# Patient Record
Sex: FEMALE | Race: WHITE | Employment: OTHER | URBAN - METROPOLITAN AREA
[De-identification: names, ages, dates, MRNs, and addresses within clinical notes are randomized per-mention and may not be internally consistent; named-entity substitution may affect disease eponyms.]

---

## 2018-12-31 LAB
AVERAGE GLUCOSE: NORMAL
CHOLESTEROL, TOTAL: 151 MG/DL
CHOLESTEROL/HDL RATIO: NORMAL
HBA1C MFR BLD: 5.8 %
HDLC SERPL-MCNC: 45 MG/DL (ref 35–70)
LDL CHOLESTEROL CALCULATED: 76 MG/DL (ref 0–160)
TRIGL SERPL-MCNC: 151 MG/DL
VLDLC SERPL CALC-MCNC: 30 MG/DL

## 2019-01-04 ENCOUNTER — ANESTHESIA EVENT (OUTPATIENT)
Dept: OPERATING ROOM | Age: 74
End: 2019-01-04
Payer: MEDICARE

## 2019-01-08 ENCOUNTER — HOSPITAL ENCOUNTER (OUTPATIENT)
Age: 74
Setting detail: OUTPATIENT SURGERY
Discharge: HOME | End: 2019-01-08
Attending: SPECIALIST | Admitting: SPECIALIST
Payer: MEDICARE

## 2019-01-08 ENCOUNTER — ANESTHESIA (OUTPATIENT)
Dept: OPERATING ROOM | Age: 74
End: 2019-01-08
Payer: MEDICARE

## 2019-01-08 VITALS — OXYGEN SATURATION: 96 % | SYSTOLIC BLOOD PRESSURE: 92 MMHG | DIASTOLIC BLOOD PRESSURE: 43 MMHG

## 2019-01-08 VITALS
OXYGEN SATURATION: 99 % | BODY MASS INDEX: 34.41 KG/M2 | DIASTOLIC BLOOD PRESSURE: 59 MMHG | RESPIRATION RATE: 16 BRPM | SYSTOLIC BLOOD PRESSURE: 129 MMHG | HEART RATE: 71 BPM | HEIGHT: 62 IN | TEMPERATURE: 97.3 F | WEIGHT: 187 LBS

## 2019-01-08 LAB
ALBUMIN SERPL-MCNC: 3.6 GM/DL (ref 3.4–5)
ALP BLD-CCNC: 97 IU/L (ref 40–129)
ALT SERPL-CCNC: 10 U/L (ref 10–40)
ANION GAP SERPL CALCULATED.3IONS-SCNC: 12 MMOL/L (ref 4–16)
AST SERPL-CCNC: 12 IU/L (ref 15–37)
BILIRUB SERPL-MCNC: 0.2 MG/DL (ref 0–1)
BUN BLDV-MCNC: 31 MG/DL (ref 6–23)
CALCIUM SERPL-MCNC: 9.7 MG/DL (ref 8.3–10.6)
CEA: 4.2 NG/ML
CHLORIDE BLD-SCNC: 103 MMOL/L (ref 99–110)
CO2: 23 MMOL/L (ref 21–32)
CREAT SERPL-MCNC: 1.7 MG/DL (ref 0.6–1.1)
GFR AFRICAN AMERICAN: 36 ML/MIN/1.73M2
GFR NON-AFRICAN AMERICAN: 29 ML/MIN/1.73M2
GLUCOSE BLD-MCNC: 134 MG/DL (ref 70–99)
HCT VFR BLD CALC: 28.3 % (ref 37–47)
HEMOGLOBIN: 8.1 GM/DL (ref 12.5–16)
MCH RBC QN AUTO: 23.3 PG (ref 27–31)
MCHC RBC AUTO-ENTMCNC: 28.6 % (ref 32–36)
MCV RBC AUTO: 81.3 FL (ref 78–100)
PDW BLD-RTO: 19.9 % (ref 11.7–14.9)
PLATELET # BLD: 222 K/CU MM (ref 140–440)
PMV BLD AUTO: 10.8 FL (ref 7.5–11.1)
POTASSIUM SERPL-SCNC: 5.2 MMOL/L (ref 3.5–5.1)
RBC # BLD: 3.48 M/CU MM (ref 4.2–5.4)
SODIUM BLD-SCNC: 138 MMOL/L (ref 135–145)
TOTAL PROTEIN: 6.1 GM/DL (ref 6.4–8.2)
WBC # BLD: 5 K/CU MM (ref 4–10.5)

## 2019-01-08 PROCEDURE — 80053 COMPREHEN METABOLIC PANEL: CPT

## 2019-01-08 PROCEDURE — 7100000010 HC PHASE II RECOVERY - FIRST 15 MIN: Performed by: SPECIALIST

## 2019-01-08 PROCEDURE — 2580000003 HC RX 258: Performed by: SPECIALIST

## 2019-01-08 PROCEDURE — 6360000002 HC RX W HCPCS: Performed by: NURSE ANESTHETIST, CERTIFIED REGISTERED

## 2019-01-08 PROCEDURE — 3700000001 HC ADD 15 MINUTES (ANESTHESIA): Performed by: SPECIALIST

## 2019-01-08 PROCEDURE — 2709999900 HC NON-CHARGEABLE SUPPLY: Performed by: SPECIALIST

## 2019-01-08 PROCEDURE — 88305 TISSUE EXAM BY PATHOLOGIST: CPT

## 2019-01-08 PROCEDURE — 3700000000 HC ANESTHESIA ATTENDED CARE: Performed by: SPECIALIST

## 2019-01-08 PROCEDURE — 7100000011 HC PHASE II RECOVERY - ADDTL 15 MIN: Performed by: SPECIALIST

## 2019-01-08 PROCEDURE — 2500000003 HC RX 250 WO HCPCS: Performed by: NURSE ANESTHETIST, CERTIFIED REGISTERED

## 2019-01-08 PROCEDURE — 3609008300 HC SIGMOIDOSCOPY W/BIOPSY SINGLE/MULTIPLE: Performed by: SPECIALIST

## 2019-01-08 PROCEDURE — 82378 CARCINOEMBRYONIC ANTIGEN: CPT

## 2019-01-08 PROCEDURE — 85027 COMPLETE CBC AUTOMATED: CPT

## 2019-01-08 RX ORDER — PROPOFOL 10 MG/ML
INJECTION, EMULSION INTRAVENOUS PRN
Status: DISCONTINUED | OUTPATIENT
Start: 2019-01-08 | End: 2019-01-08 | Stop reason: SDUPTHER

## 2019-01-08 RX ORDER — LIDOCAINE HYDROCHLORIDE 20 MG/ML
INJECTION, SOLUTION EPIDURAL; INFILTRATION; INTRACAUDAL; PERINEURAL PRN
Status: DISCONTINUED | OUTPATIENT
Start: 2019-01-08 | End: 2019-01-08 | Stop reason: SDUPTHER

## 2019-01-08 RX ORDER — SODIUM CHLORIDE, SODIUM LACTATE, POTASSIUM CHLORIDE, CALCIUM CHLORIDE 600; 310; 30; 20 MG/100ML; MG/100ML; MG/100ML; MG/100ML
INJECTION, SOLUTION INTRAVENOUS CONTINUOUS
Status: DISCONTINUED | OUTPATIENT
Start: 2019-01-08 | End: 2019-01-08 | Stop reason: HOSPADM

## 2019-01-08 RX ADMIN — SODIUM CHLORIDE, POTASSIUM CHLORIDE, SODIUM LACTATE AND CALCIUM CHLORIDE: 600; 310; 30; 20 INJECTION, SOLUTION INTRAVENOUS at 09:28

## 2019-01-08 RX ADMIN — PROPOFOL 30 MG: 10 INJECTION, EMULSION INTRAVENOUS at 10:19

## 2019-01-08 RX ADMIN — SODIUM CHLORIDE, POTASSIUM CHLORIDE, SODIUM LACTATE AND CALCIUM CHLORIDE: 600; 310; 30; 20 INJECTION, SOLUTION INTRAVENOUS at 11:12

## 2019-01-08 RX ADMIN — PROPOFOL 20 MG: 10 INJECTION, EMULSION INTRAVENOUS at 10:21

## 2019-01-08 RX ADMIN — PROPOFOL 30 MG: 10 INJECTION, EMULSION INTRAVENOUS at 10:23

## 2019-01-08 RX ADMIN — PROPOFOL 50 MG: 10 INJECTION, EMULSION INTRAVENOUS at 10:13

## 2019-01-08 RX ADMIN — PROPOFOL 30 MG: 10 INJECTION, EMULSION INTRAVENOUS at 10:27

## 2019-01-08 RX ADMIN — PROPOFOL 20 MG: 10 INJECTION, EMULSION INTRAVENOUS at 10:24

## 2019-01-08 RX ADMIN — LIDOCAINE HYDROCHLORIDE 60 MG: 20 INJECTION, SOLUTION EPIDURAL; INFILTRATION; INTRACAUDAL; PERINEURAL at 10:13

## 2019-01-08 RX ADMIN — PROPOFOL 30 MG: 10 INJECTION, EMULSION INTRAVENOUS at 10:15

## 2019-01-08 RX ADMIN — PROPOFOL 20 MG: 10 INJECTION, EMULSION INTRAVENOUS at 10:17

## 2019-01-08 RX ADMIN — PROPOFOL 20 MG: 10 INJECTION, EMULSION INTRAVENOUS at 10:26

## 2019-01-08 ASSESSMENT — PAIN SCALES - GENERAL
PAINLEVEL_OUTOF10: 0

## 2019-01-08 ASSESSMENT — PAIN - FUNCTIONAL ASSESSMENT: PAIN_FUNCTIONAL_ASSESSMENT: 0-10

## 2019-01-10 ENCOUNTER — ANESTHESIA EVENT (OUTPATIENT)
Dept: OPERATING ROOM | Age: 74
DRG: 331 | End: 2019-01-10
Payer: MEDICARE

## 2019-01-10 ENCOUNTER — OFFICE VISIT (OUTPATIENT)
Dept: SURGERY | Age: 74
End: 2019-01-10
Payer: MEDICARE

## 2019-01-10 VITALS
SYSTOLIC BLOOD PRESSURE: 124 MMHG | HEART RATE: 101 BPM | RESPIRATION RATE: 20 BRPM | BODY MASS INDEX: 34.31 KG/M2 | DIASTOLIC BLOOD PRESSURE: 64 MMHG | WEIGHT: 187.6 LBS

## 2019-01-10 DIAGNOSIS — C18.7 MALIGNANT NEOPLASM OF SIGMOID COLON (HCC): Primary | ICD-10-CM

## 2019-01-10 PROCEDURE — 3017F COLORECTAL CA SCREEN DOC REV: CPT | Performed by: SURGERY

## 2019-01-10 PROCEDURE — G8427 DOCREV CUR MEDS BY ELIG CLIN: HCPCS | Performed by: SURGERY

## 2019-01-10 PROCEDURE — G8484 FLU IMMUNIZE NO ADMIN: HCPCS | Performed by: SURGERY

## 2019-01-10 PROCEDURE — 1036F TOBACCO NON-USER: CPT | Performed by: SURGERY

## 2019-01-10 PROCEDURE — 99213 OFFICE O/P EST LOW 20 MIN: CPT | Performed by: SURGERY

## 2019-01-10 PROCEDURE — 1123F ACP DISCUSS/DSCN MKR DOCD: CPT | Performed by: SURGERY

## 2019-01-10 PROCEDURE — G8417 CALC BMI ABV UP PARAM F/U: HCPCS | Performed by: SURGERY

## 2019-01-10 PROCEDURE — 4040F PNEUMOC VAC/ADMIN/RCVD: CPT | Performed by: SURGERY

## 2019-01-10 PROCEDURE — 1101F PT FALLS ASSESS-DOCD LE1/YR: CPT | Performed by: SURGERY

## 2019-01-10 PROCEDURE — G8400 PT W/DXA NO RESULTS DOC: HCPCS | Performed by: SURGERY

## 2019-01-10 PROCEDURE — 1090F PRES/ABSN URINE INCON ASSESS: CPT | Performed by: SURGERY

## 2019-01-10 RX ORDER — ERYTHROMYCIN 500 MG/1
TABLET, COATED ORAL
Qty: 6 TABLET | Refills: 0 | Status: ON HOLD | OUTPATIENT
Start: 2019-01-10 | End: 2019-02-20

## 2019-01-10 ASSESSMENT — ENCOUNTER SYMPTOMS
DIARRHEA: 0
BLOOD IN STOOL: 0
COUGH: 0
ABDOMINAL DISTENTION: 0
CHEST TIGHTNESS: 0
ABDOMINAL PAIN: 0
CONSTIPATION: 0

## 2019-01-11 ENCOUNTER — HOSPITAL ENCOUNTER (OUTPATIENT)
Dept: PREADMISSION TESTING | Age: 74
Discharge: HOME | End: 2019-01-15
Payer: MEDICARE

## 2019-01-11 VITALS
TEMPERATURE: 97.2 F | HEIGHT: 61 IN | BODY MASS INDEX: 35.21 KG/M2 | WEIGHT: 186.5 LBS | HEART RATE: 88 BPM | DIASTOLIC BLOOD PRESSURE: 58 MMHG | SYSTOLIC BLOOD PRESSURE: 132 MMHG | RESPIRATION RATE: 16 BRPM | OXYGEN SATURATION: 98 %

## 2019-01-11 ASSESSMENT — ENCOUNTER SYMPTOMS: SHORTNESS OF BREATH: 1

## 2019-01-11 ASSESSMENT — PAIN SCALES - GENERAL: PAINLEVEL_OUTOF10: 0

## 2019-01-14 ENCOUNTER — HOSPITAL ENCOUNTER (OUTPATIENT)
Dept: CT IMAGING | Age: 74
Discharge: HOME | DRG: 331 | End: 2019-01-14
Payer: MEDICARE

## 2019-01-14 DIAGNOSIS — D49.0 DUDLEY-KLINGENSTEIN SYNDROME: ICD-10-CM

## 2019-01-14 PROCEDURE — 74176 CT ABD & PELVIS W/O CONTRAST: CPT

## 2019-01-14 PROCEDURE — 6360000004 HC RX CONTRAST MEDICATION: Performed by: SPECIALIST

## 2019-01-14 RX ADMIN — IOHEXOL 50 ML: 240 INJECTION, SOLUTION INTRATHECAL; INTRAVASCULAR; INTRAVENOUS; ORAL at 12:17

## 2019-01-16 ENCOUNTER — ANESTHESIA (OUTPATIENT)
Dept: OPERATING ROOM | Age: 74
DRG: 331 | End: 2019-01-16
Payer: MEDICARE

## 2019-01-16 ENCOUNTER — HOSPITAL ENCOUNTER (INPATIENT)
Age: 74
LOS: 5 days | Discharge: HOME | DRG: 331 | End: 2019-01-21
Attending: SURGERY | Admitting: SURGERY
Payer: MEDICARE

## 2019-01-16 VITALS
OXYGEN SATURATION: 100 % | DIASTOLIC BLOOD PRESSURE: 64 MMHG | RESPIRATION RATE: 14 BRPM | TEMPERATURE: 97.8 F | SYSTOLIC BLOOD PRESSURE: 131 MMHG

## 2019-01-16 DIAGNOSIS — C18.7 CANCER OF SIGMOID COLON (HCC): Primary | ICD-10-CM

## 2019-01-16 LAB
GLUCOSE BLD-MCNC: 141 MG/DL (ref 70–99)
GLUCOSE BLD-MCNC: 165 MG/DL (ref 70–99)
HCT VFR BLD CALC: 26.5 % (ref 37–47)
HEMOGLOBIN: 7.5 GM/DL (ref 12.5–16)
MCH RBC QN AUTO: 22.9 PG (ref 27–31)
MCHC RBC AUTO-ENTMCNC: 28.3 % (ref 32–36)
MCV RBC AUTO: 81 FL (ref 78–100)
PDW BLD-RTO: 18.8 % (ref 11.7–14.9)
PLATELET # BLD: 223 K/CU MM (ref 140–440)
PMV BLD AUTO: 9.9 FL (ref 7.5–11.1)
POTASSIUM SERPL-SCNC: 4.4 MMOL/L (ref 3.5–5.1)
RBC # BLD: 3.27 M/CU MM (ref 4.2–5.4)
WBC # BLD: 6.5 K/CU MM (ref 4–10.5)

## 2019-01-16 PROCEDURE — 3700000001 HC ADD 15 MINUTES (ANESTHESIA): Performed by: SURGERY

## 2019-01-16 PROCEDURE — 84132 ASSAY OF SERUM POTASSIUM: CPT

## 2019-01-16 PROCEDURE — 2780000010 HC IMPLANT OTHER: Performed by: SURGERY

## 2019-01-16 PROCEDURE — 2500000003 HC RX 250 WO HCPCS: Performed by: NURSE ANESTHETIST, CERTIFIED REGISTERED

## 2019-01-16 PROCEDURE — 82962 GLUCOSE BLOOD TEST: CPT

## 2019-01-16 PROCEDURE — 85027 COMPLETE CBC AUTOMATED: CPT

## 2019-01-16 PROCEDURE — 1200000000 HC SEMI PRIVATE

## 2019-01-16 PROCEDURE — 2500000003 HC RX 250 WO HCPCS: Performed by: SURGERY

## 2019-01-16 PROCEDURE — 0DBN0ZZ EXCISION OF SIGMOID COLON, OPEN APPROACH: ICD-10-PCS | Performed by: SURGERY

## 2019-01-16 PROCEDURE — 88309 TISSUE EXAM BY PATHOLOGIST: CPT

## 2019-01-16 PROCEDURE — 3600000004 HC SURGERY LEVEL 4 BASE: Performed by: SURGERY

## 2019-01-16 PROCEDURE — 7100000000 HC PACU RECOVERY - FIRST 15 MIN: Performed by: SURGERY

## 2019-01-16 PROCEDURE — 2709999900 HC NON-CHARGEABLE SUPPLY: Performed by: SURGERY

## 2019-01-16 PROCEDURE — 88311 DECALCIFY TISSUE: CPT

## 2019-01-16 PROCEDURE — 6360000002 HC RX W HCPCS: Performed by: NURSE ANESTHETIST, CERTIFIED REGISTERED

## 2019-01-16 PROCEDURE — 7100000001 HC PACU RECOVERY - ADDTL 15 MIN: Performed by: SURGERY

## 2019-01-16 PROCEDURE — 44140 PARTIAL REMOVAL OF COLON: CPT | Performed by: SURGERY

## 2019-01-16 PROCEDURE — 6360000002 HC RX W HCPCS: Performed by: ANESTHESIOLOGY

## 2019-01-16 PROCEDURE — 3600000014 HC SURGERY LEVEL 4 ADDTL 15MIN: Performed by: SURGERY

## 2019-01-16 PROCEDURE — 2580000003 HC RX 258: Performed by: ANESTHESIOLOGY

## 2019-01-16 PROCEDURE — 2720000010 HC SURG SUPPLY STERILE: Performed by: SURGERY

## 2019-01-16 PROCEDURE — 64488 TAP BLOCK BI INJECTION: CPT | Performed by: NURSE ANESTHETIST, CERTIFIED REGISTERED

## 2019-01-16 PROCEDURE — 3700000000 HC ANESTHESIA ATTENDED CARE: Performed by: SURGERY

## 2019-01-16 DEVICE — SEALANT HEMSTAT 5ML HUM FIBRIN THROM 2 VI APPL DEV EVICEL: Type: IMPLANTABLE DEVICE | Site: ABDOMEN | Status: FUNCTIONAL

## 2019-01-16 RX ORDER — VECURONIUM BROMIDE 1 MG/ML
INJECTION, POWDER, LYOPHILIZED, FOR SOLUTION INTRAVENOUS PRN
Status: DISCONTINUED | OUTPATIENT
Start: 2019-01-16 | End: 2019-01-16 | Stop reason: SDUPTHER

## 2019-01-16 RX ORDER — KETOROLAC TROMETHAMINE 30 MG/ML
INJECTION, SOLUTION INTRAMUSCULAR; INTRAVENOUS PRN
Status: DISCONTINUED | OUTPATIENT
Start: 2019-01-16 | End: 2019-01-16 | Stop reason: SDUPTHER

## 2019-01-16 RX ORDER — HYDROMORPHONE HCL 110MG/55ML
0.5 PATIENT CONTROLLED ANALGESIA SYRINGE INTRAVENOUS EVERY 5 MIN PRN
Status: DISCONTINUED | OUTPATIENT
Start: 2019-01-16 | End: 2019-01-16 | Stop reason: HOSPADM

## 2019-01-16 RX ORDER — LISINOPRIL 10 MG/1
10 TABLET ORAL EVERY MORNING
Status: DISCONTINUED | OUTPATIENT
Start: 2019-01-17 | End: 2019-01-21 | Stop reason: HOSPADM

## 2019-01-16 RX ORDER — LABETALOL HYDROCHLORIDE 5 MG/ML
5 INJECTION, SOLUTION INTRAVENOUS EVERY 10 MIN PRN
Status: DISCONTINUED | OUTPATIENT
Start: 2019-01-16 | End: 2019-01-16 | Stop reason: HOSPADM

## 2019-01-16 RX ORDER — ACETAMINOPHEN 10 MG/ML
1000 INJECTION, SOLUTION INTRAVENOUS ONCE
Status: COMPLETED | OUTPATIENT
Start: 2019-01-16 | End: 2019-01-16

## 2019-01-16 RX ORDER — ONDANSETRON 2 MG/ML
4 INJECTION INTRAMUSCULAR; INTRAVENOUS
Status: DISCONTINUED | OUTPATIENT
Start: 2019-01-16 | End: 2019-01-16 | Stop reason: HOSPADM

## 2019-01-16 RX ORDER — FENTANYL CITRATE 50 UG/ML
INJECTION, SOLUTION INTRAMUSCULAR; INTRAVENOUS PRN
Status: DISCONTINUED | OUTPATIENT
Start: 2019-01-16 | End: 2019-01-16 | Stop reason: SDUPTHER

## 2019-01-16 RX ORDER — TRIAMTERENE AND HYDROCHLOROTHIAZIDE 37.5; 25 MG/1; MG/1
1 TABLET ORAL EVERY MORNING
Status: DISCONTINUED | OUTPATIENT
Start: 2019-01-17 | End: 2019-01-21 | Stop reason: HOSPADM

## 2019-01-16 RX ORDER — PROPOFOL 10 MG/ML
INJECTION, EMULSION INTRAVENOUS PRN
Status: DISCONTINUED | OUTPATIENT
Start: 2019-01-16 | End: 2019-01-16 | Stop reason: SDUPTHER

## 2019-01-16 RX ORDER — MORPHINE SULFATE 4 MG/ML
1 INJECTION, SOLUTION INTRAMUSCULAR; INTRAVENOUS
Status: DISCONTINUED | OUTPATIENT
Start: 2019-01-16 | End: 2019-01-20

## 2019-01-16 RX ORDER — ACETAMINOPHEN 325 MG/1
650 TABLET ORAL EVERY 4 HOURS PRN
Status: DISCONTINUED | OUTPATIENT
Start: 2019-01-16 | End: 2019-01-21 | Stop reason: HOSPADM

## 2019-01-16 RX ORDER — DEXAMETHASONE SODIUM PHOSPHATE 10 MG/ML
INJECTION, SOLUTION INTRAMUSCULAR; INTRAVENOUS PRN
Status: DISCONTINUED | OUTPATIENT
Start: 2019-01-16 | End: 2019-01-16 | Stop reason: SDUPTHER

## 2019-01-16 RX ORDER — FENTANYL CITRATE 50 UG/ML
25 INJECTION, SOLUTION INTRAMUSCULAR; INTRAVENOUS EVERY 5 MIN PRN
Status: DISCONTINUED | OUTPATIENT
Start: 2019-01-16 | End: 2019-01-16 | Stop reason: HOSPADM

## 2019-01-16 RX ORDER — ONDANSETRON 2 MG/ML
4 INJECTION INTRAMUSCULAR; INTRAVENOUS EVERY 6 HOURS PRN
Status: DISCONTINUED | OUTPATIENT
Start: 2019-01-16 | End: 2019-01-21 | Stop reason: HOSPADM

## 2019-01-16 RX ORDER — DEXTROSE, SODIUM CHLORIDE, AND POTASSIUM CHLORIDE 5; .45; .15 G/100ML; G/100ML; G/100ML
1000 INJECTION INTRAVENOUS CONTINUOUS
Status: DISCONTINUED | OUTPATIENT
Start: 2019-01-16 | End: 2019-01-19

## 2019-01-16 RX ORDER — LIDOCAINE HYDROCHLORIDE 20 MG/ML
INJECTION, SOLUTION INFILTRATION; PERINEURAL PRN
Status: DISCONTINUED | OUTPATIENT
Start: 2019-01-16 | End: 2019-01-16 | Stop reason: SDUPTHER

## 2019-01-16 RX ORDER — DEXAMETHASONE SODIUM PHOSPHATE 4 MG/ML
INJECTION, SOLUTION INTRA-ARTICULAR; INTRALESIONAL; INTRAMUSCULAR; INTRAVENOUS; SOFT TISSUE PRN
Status: DISCONTINUED | OUTPATIENT
Start: 2019-01-16 | End: 2019-01-16 | Stop reason: SDUPTHER

## 2019-01-16 RX ORDER — ONDANSETRON 2 MG/ML
INJECTION INTRAMUSCULAR; INTRAVENOUS PRN
Status: DISCONTINUED | OUTPATIENT
Start: 2019-01-16 | End: 2019-01-16 | Stop reason: SDUPTHER

## 2019-01-16 RX ORDER — ROPIVACAINE HYDROCHLORIDE 5 MG/ML
INJECTION, SOLUTION EPIDURAL; INFILTRATION; PERINEURAL PRN
Status: DISCONTINUED | OUTPATIENT
Start: 2019-01-16 | End: 2019-01-16 | Stop reason: SDUPTHER

## 2019-01-16 RX ORDER — HYDRALAZINE HYDROCHLORIDE 20 MG/ML
5 INJECTION INTRAMUSCULAR; INTRAVENOUS EVERY 10 MIN PRN
Status: DISCONTINUED | OUTPATIENT
Start: 2019-01-16 | End: 2019-01-16 | Stop reason: HOSPADM

## 2019-01-16 RX ORDER — PROPOFOL 10 MG/ML
INJECTION, EMULSION INTRAVENOUS CONTINUOUS PRN
Status: DISCONTINUED | OUTPATIENT
Start: 2019-01-16 | End: 2019-01-16 | Stop reason: SDUPTHER

## 2019-01-16 RX ORDER — ROCURONIUM BROMIDE 10 MG/ML
INJECTION, SOLUTION INTRAVENOUS PRN
Status: DISCONTINUED | OUTPATIENT
Start: 2019-01-16 | End: 2019-01-16 | Stop reason: SDUPTHER

## 2019-01-16 RX ORDER — SODIUM CHLORIDE, SODIUM LACTATE, POTASSIUM CHLORIDE, CALCIUM CHLORIDE 600; 310; 30; 20 MG/100ML; MG/100ML; MG/100ML; MG/100ML
INJECTION, SOLUTION INTRAVENOUS ONCE
Status: COMPLETED | OUTPATIENT
Start: 2019-01-16 | End: 2019-01-16

## 2019-01-16 RX ADMIN — SODIUM CHLORIDE, POTASSIUM CHLORIDE, SODIUM LACTATE AND CALCIUM CHLORIDE: 600; 310; 30; 20 INJECTION, SOLUTION INTRAVENOUS at 09:07

## 2019-01-16 RX ADMIN — KETOROLAC TROMETHAMINE 30 MG: 30 INJECTION, SOLUTION INTRAMUSCULAR; INTRAVENOUS at 09:54

## 2019-01-16 RX ADMIN — FENTANYL CITRATE 50 MCG: 50 INJECTION INTRAMUSCULAR; INTRAVENOUS at 09:15

## 2019-01-16 RX ADMIN — ROPIVACAINE HYDROCHLORIDE 30 ML: 5 INJECTION, SOLUTION EPIDURAL; INFILTRATION; PERINEURAL at 08:08

## 2019-01-16 RX ADMIN — FENTANYL CITRATE 25 MCG: 50 INJECTION, SOLUTION INTRAMUSCULAR; INTRAVENOUS at 11:00

## 2019-01-16 RX ADMIN — DEXAMETHASONE SODIUM PHOSPHATE 4 MG: 4 INJECTION, SOLUTION INTRAMUSCULAR; INTRAVENOUS at 08:05

## 2019-01-16 RX ADMIN — HYDROMORPHONE HYDROCHLORIDE 0.5 MG: 2 INJECTION INTRAMUSCULAR; INTRAVENOUS; SUBCUTANEOUS at 11:30

## 2019-01-16 RX ADMIN — FENTANYL CITRATE 100 MCG: 50 INJECTION INTRAMUSCULAR; INTRAVENOUS at 07:58

## 2019-01-16 RX ADMIN — FENTANYL CITRATE 25 MCG: 50 INJECTION, SOLUTION INTRAMUSCULAR; INTRAVENOUS at 13:00

## 2019-01-16 RX ADMIN — FENTANYL CITRATE 50 MCG: 50 INJECTION INTRAMUSCULAR; INTRAVENOUS at 09:58

## 2019-01-16 RX ADMIN — VECURONIUM BROMIDE FOR INJECTION 5 MG: 1 INJECTION, POWDER, LYOPHILIZED, FOR SOLUTION INTRAVENOUS at 08:30

## 2019-01-16 RX ADMIN — FENTANYL CITRATE 25 MCG: 50 INJECTION, SOLUTION INTRAMUSCULAR; INTRAVENOUS at 10:30

## 2019-01-16 RX ADMIN — TAZOBACTAM SODIUM AND PIPERACILLIN SODIUM 3.38 G: 375; 3 INJECTION, SOLUTION INTRAVENOUS at 08:18

## 2019-01-16 RX ADMIN — PROPOFOL 85 MCG/KG/MIN: 10 INJECTION, EMULSION INTRAVENOUS at 08:01

## 2019-01-16 RX ADMIN — PROPOFOL 120 MG: 10 INJECTION, EMULSION INTRAVENOUS at 07:58

## 2019-01-16 RX ADMIN — POTASSIUM CHLORIDE, DEXTROSE MONOHYDRATE AND SODIUM CHLORIDE 1000 ML: 150; 5; 450 INJECTION, SOLUTION INTRAVENOUS at 18:12

## 2019-01-16 RX ADMIN — PROPOFOL 50 MG: 10 INJECTION, EMULSION INTRAVENOUS at 08:05

## 2019-01-16 RX ADMIN — VECURONIUM BROMIDE FOR INJECTION 1 MG: 1 INJECTION, POWDER, LYOPHILIZED, FOR SOLUTION INTRAVENOUS at 09:15

## 2019-01-16 RX ADMIN — DEXAMETHASONE SODIUM PHOSPHATE 10 MG: 10 INJECTION, SOLUTION INTRAMUSCULAR; INTRAVENOUS at 08:08

## 2019-01-16 RX ADMIN — LIDOCAINE HYDROCHLORIDE 100 MG: 20 INJECTION, SOLUTION INFILTRATION; PERINEURAL at 07:58

## 2019-01-16 RX ADMIN — VECURONIUM BROMIDE FOR INJECTION 2 MG: 1 INJECTION, POWDER, LYOPHILIZED, FOR SOLUTION INTRAVENOUS at 09:34

## 2019-01-16 RX ADMIN — ROCURONIUM BROMIDE 50 MG: 50 INJECTION, SOLUTION INTRAVENOUS at 07:59

## 2019-01-16 RX ADMIN — ONDANSETRON HYDROCHLORIDE 4 MG: 2 SOLUTION INTRAMUSCULAR; INTRAVENOUS at 09:42

## 2019-01-16 RX ADMIN — ACETAMINOPHEN 1000 MG: 10 INJECTION, SOLUTION INTRAVENOUS at 11:37

## 2019-01-16 RX ADMIN — SODIUM CHLORIDE, POTASSIUM CHLORIDE, SODIUM LACTATE AND CALCIUM CHLORIDE: 600; 310; 30; 20 INJECTION, SOLUTION INTRAVENOUS at 07:40

## 2019-01-16 RX ADMIN — POTASSIUM CHLORIDE, DEXTROSE MONOHYDRATE AND SODIUM CHLORIDE 1000 ML: 150; 5; 450 INJECTION, SOLUTION INTRAVENOUS at 10:37

## 2019-01-16 ASSESSMENT — PULMONARY FUNCTION TESTS
PIF_VALUE: 23
PIF_VALUE: 20
PIF_VALUE: 21
PIF_VALUE: 20
PIF_VALUE: 21
PIF_VALUE: 19
PIF_VALUE: 22
PIF_VALUE: 6
PIF_VALUE: 19
PIF_VALUE: 20
PIF_VALUE: 20
PIF_VALUE: 22
PIF_VALUE: 21
PIF_VALUE: 21
PIF_VALUE: 20
PIF_VALUE: 22
PIF_VALUE: 20
PIF_VALUE: 19
PIF_VALUE: 21
PIF_VALUE: 20
PIF_VALUE: 21
PIF_VALUE: 22
PIF_VALUE: 22
PIF_VALUE: 21
PIF_VALUE: 20
PIF_VALUE: 16
PIF_VALUE: 20
PIF_VALUE: 22
PIF_VALUE: 23
PIF_VALUE: 19
PIF_VALUE: 22
PIF_VALUE: 23
PIF_VALUE: 22
PIF_VALUE: 33
PIF_VALUE: 20
PIF_VALUE: 19
PIF_VALUE: 21
PIF_VALUE: 19
PIF_VALUE: 0
PIF_VALUE: 20
PIF_VALUE: 22
PIF_VALUE: 32
PIF_VALUE: 22
PIF_VALUE: 0
PIF_VALUE: 21
PIF_VALUE: 22
PIF_VALUE: 22
PIF_VALUE: 19
PIF_VALUE: 20
PIF_VALUE: 15
PIF_VALUE: 22
PIF_VALUE: 21
PIF_VALUE: 22
PIF_VALUE: 21
PIF_VALUE: 23
PIF_VALUE: 22
PIF_VALUE: 20
PIF_VALUE: 22
PIF_VALUE: 22
PIF_VALUE: 21
PIF_VALUE: 23
PIF_VALUE: 22
PIF_VALUE: 22
PIF_VALUE: 21
PIF_VALUE: 22
PIF_VALUE: 22
PIF_VALUE: 19
PIF_VALUE: 22
PIF_VALUE: 23
PIF_VALUE: 23
PIF_VALUE: 20
PIF_VALUE: 18
PIF_VALUE: 22
PIF_VALUE: 22
PIF_VALUE: 3
PIF_VALUE: 18
PIF_VALUE: 23
PIF_VALUE: 19
PIF_VALUE: 19
PIF_VALUE: 18
PIF_VALUE: 23
PIF_VALUE: 19
PIF_VALUE: 22
PIF_VALUE: 22
PIF_VALUE: 0
PIF_VALUE: 22
PIF_VALUE: 19
PIF_VALUE: 22
PIF_VALUE: 19
PIF_VALUE: 23
PIF_VALUE: 21
PIF_VALUE: 17
PIF_VALUE: 22
PIF_VALUE: 21
PIF_VALUE: 22
PIF_VALUE: 21
PIF_VALUE: 23
PIF_VALUE: 22
PIF_VALUE: 22
PIF_VALUE: 18
PIF_VALUE: 22
PIF_VALUE: 23
PIF_VALUE: 22
PIF_VALUE: 22
PIF_VALUE: 15
PIF_VALUE: 19
PIF_VALUE: 22
PIF_VALUE: 22
PIF_VALUE: 3
PIF_VALUE: 22
PIF_VALUE: 0
PIF_VALUE: 23
PIF_VALUE: 22
PIF_VALUE: 18

## 2019-01-16 ASSESSMENT — PAIN SCALES - GENERAL
PAINLEVEL_OUTOF10: 7
PAINLEVEL_OUTOF10: 5
PAINLEVEL_OUTOF10: 0
PAINLEVEL_OUTOF10: 0
PAINLEVEL_OUTOF10: 5
PAINLEVEL_OUTOF10: 5

## 2019-01-16 ASSESSMENT — PAIN DESCRIPTION - PAIN TYPE
TYPE: SURGICAL PAIN
TYPE: SURGICAL PAIN

## 2019-01-16 ASSESSMENT — PAIN DESCRIPTION - FREQUENCY
FREQUENCY: CONTINUOUS
FREQUENCY: CONTINUOUS

## 2019-01-16 ASSESSMENT — PAIN DESCRIPTION - ORIENTATION
ORIENTATION: MID
ORIENTATION: MID

## 2019-01-16 ASSESSMENT — PAIN - FUNCTIONAL ASSESSMENT: PAIN_FUNCTIONAL_ASSESSMENT: 0-10

## 2019-01-16 ASSESSMENT — PAIN DESCRIPTION - LOCATION
LOCATION: ABDOMEN
LOCATION: ABDOMEN

## 2019-01-17 LAB
BASOPHILS ABSOLUTE: 0 K/CU MM
BASOPHILS RELATIVE PERCENT: 0 % (ref 0–1)
DIFFERENTIAL TYPE: ABNORMAL
EOSINOPHILS ABSOLUTE: 0 K/CU MM
EOSINOPHILS RELATIVE PERCENT: 0 % (ref 0–3)
HCT VFR BLD CALC: 24.4 % (ref 37–47)
HEMOGLOBIN: 7 GM/DL (ref 12.5–16)
IMMATURE NEUTROPHIL %: 0.5 % (ref 0–0.43)
LYMPHOCYTES ABSOLUTE: 0.4 K/CU MM
LYMPHOCYTES RELATIVE PERCENT: 5.3 % (ref 24–44)
MCH RBC QN AUTO: 23.3 PG (ref 27–31)
MCHC RBC AUTO-ENTMCNC: 28.7 % (ref 32–36)
MCV RBC AUTO: 81.3 FL (ref 78–100)
MONOCYTES ABSOLUTE: 0.3 K/CU MM
MONOCYTES RELATIVE PERCENT: 3.5 % (ref 0–4)
NUCLEATED RBC %: 0 %
PDW BLD-RTO: 18.6 % (ref 11.7–14.9)
PLATELET # BLD: 223 K/CU MM (ref 140–440)
PMV BLD AUTO: 10.5 FL (ref 7.5–11.1)
RBC # BLD: 3 M/CU MM (ref 4.2–5.4)
SEGMENTED NEUTROPHILS ABSOLUTE COUNT: 7 K/CU MM
SEGMENTED NEUTROPHILS RELATIVE PERCENT: 90.7 % (ref 36–66)
TOTAL IMMATURE NEUTOROPHIL: 0.04 K/CU MM
TOTAL NUCLEATED RBC: 0 K/CU MM
WBC # BLD: 7.7 K/CU MM (ref 4–10.5)

## 2019-01-17 PROCEDURE — 94761 N-INVAS EAR/PLS OXIMETRY MLT: CPT

## 2019-01-17 PROCEDURE — 1200000000 HC SEMI PRIVATE

## 2019-01-17 PROCEDURE — 94150 VITAL CAPACITY TEST: CPT

## 2019-01-17 PROCEDURE — 85025 COMPLETE CBC W/AUTO DIFF WBC: CPT

## 2019-01-17 PROCEDURE — 2500000003 HC RX 250 WO HCPCS: Performed by: SURGERY

## 2019-01-17 PROCEDURE — 6360000002 HC RX W HCPCS: Performed by: SURGERY

## 2019-01-17 PROCEDURE — 99024 POSTOP FOLLOW-UP VISIT: CPT | Performed by: SURGERY

## 2019-01-17 PROCEDURE — 36415 COLL VENOUS BLD VENIPUNCTURE: CPT

## 2019-01-17 PROCEDURE — 2700000000 HC OXYGEN THERAPY PER DAY

## 2019-01-17 PROCEDURE — 6370000000 HC RX 637 (ALT 250 FOR IP): Performed by: SURGERY

## 2019-01-17 RX ADMIN — ACETAMINOPHEN 650 MG: 325 TABLET, FILM COATED ORAL at 21:03

## 2019-01-17 RX ADMIN — ENOXAPARIN SODIUM 30 MG: 30 INJECTION SUBCUTANEOUS at 09:18

## 2019-01-17 RX ADMIN — LISINOPRIL 10 MG: 10 TABLET ORAL at 09:18

## 2019-01-17 RX ADMIN — TRIAMTERENE AND HYDROCHLOROTHIAZIDE 1 TABLET: 37.5; 25 TABLET ORAL at 09:18

## 2019-01-17 RX ADMIN — POTASSIUM CHLORIDE, DEXTROSE MONOHYDRATE AND SODIUM CHLORIDE 1000 ML: 150; 5; 450 INJECTION, SOLUTION INTRAVENOUS at 17:37

## 2019-01-17 RX ADMIN — POTASSIUM CHLORIDE, DEXTROSE MONOHYDRATE AND SODIUM CHLORIDE 1000 ML: 150; 5; 450 INJECTION, SOLUTION INTRAVENOUS at 01:19

## 2019-01-17 RX ADMIN — POTASSIUM CHLORIDE, DEXTROSE MONOHYDRATE AND SODIUM CHLORIDE 1000 ML: 150; 5; 450 INJECTION, SOLUTION INTRAVENOUS at 09:18

## 2019-01-17 ASSESSMENT — PAIN DESCRIPTION - DESCRIPTORS: DESCRIPTORS: SORE

## 2019-01-17 ASSESSMENT — PAIN DESCRIPTION - PAIN TYPE: TYPE: SURGICAL PAIN

## 2019-01-17 ASSESSMENT — PAIN DESCRIPTION - PROGRESSION: CLINICAL_PROGRESSION: GRADUALLY WORSENING

## 2019-01-17 ASSESSMENT — PAIN DESCRIPTION - LOCATION: LOCATION: ABDOMEN

## 2019-01-17 ASSESSMENT — PAIN DESCRIPTION - ORIENTATION: ORIENTATION: MID

## 2019-01-17 ASSESSMENT — PAIN DESCRIPTION - FREQUENCY: FREQUENCY: INTERMITTENT

## 2019-01-17 ASSESSMENT — PAIN SCALES - GENERAL: PAINLEVEL_OUTOF10: 3

## 2019-01-17 ASSESSMENT — PAIN DESCRIPTION - ONSET: ONSET: ON-GOING

## 2019-01-18 PROCEDURE — 99024 POSTOP FOLLOW-UP VISIT: CPT | Performed by: NURSE PRACTITIONER

## 2019-01-18 PROCEDURE — 6370000000 HC RX 637 (ALT 250 FOR IP): Performed by: SURGERY

## 2019-01-18 PROCEDURE — 2700000000 HC OXYGEN THERAPY PER DAY

## 2019-01-18 PROCEDURE — 1200000000 HC SEMI PRIVATE

## 2019-01-18 PROCEDURE — 94761 N-INVAS EAR/PLS OXIMETRY MLT: CPT

## 2019-01-18 PROCEDURE — 6360000002 HC RX W HCPCS: Performed by: SURGERY

## 2019-01-18 PROCEDURE — 94150 VITAL CAPACITY TEST: CPT

## 2019-01-18 PROCEDURE — 2500000003 HC RX 250 WO HCPCS: Performed by: SURGERY

## 2019-01-18 RX ADMIN — ACETAMINOPHEN 650 MG: 325 TABLET, FILM COATED ORAL at 01:49

## 2019-01-18 RX ADMIN — ENOXAPARIN SODIUM 30 MG: 30 INJECTION SUBCUTANEOUS at 08:39

## 2019-01-18 RX ADMIN — POTASSIUM CHLORIDE, DEXTROSE MONOHYDRATE AND SODIUM CHLORIDE 1000 ML: 150; 5; 450 INJECTION, SOLUTION INTRAVENOUS at 17:19

## 2019-01-18 RX ADMIN — ACETAMINOPHEN 650 MG: 325 TABLET, FILM COATED ORAL at 20:43

## 2019-01-18 RX ADMIN — POTASSIUM CHLORIDE, DEXTROSE MONOHYDRATE AND SODIUM CHLORIDE 1000 ML: 150; 5; 450 INJECTION, SOLUTION INTRAVENOUS at 01:40

## 2019-01-18 ASSESSMENT — PAIN DESCRIPTION - PROGRESSION: CLINICAL_PROGRESSION: NOT CHANGED

## 2019-01-18 ASSESSMENT — PAIN DESCRIPTION - ORIENTATION
ORIENTATION: MID
ORIENTATION: MID

## 2019-01-18 ASSESSMENT — PAIN DESCRIPTION - FREQUENCY
FREQUENCY: INTERMITTENT
FREQUENCY: INTERMITTENT

## 2019-01-18 ASSESSMENT — PAIN SCALES - GENERAL
PAINLEVEL_OUTOF10: 1
PAINLEVEL_OUTOF10: 3
PAINLEVEL_OUTOF10: 3

## 2019-01-18 ASSESSMENT — PAIN DESCRIPTION - ONSET
ONSET: ON-GOING
ONSET: GRADUAL

## 2019-01-18 ASSESSMENT — PAIN DESCRIPTION - LOCATION
LOCATION: ABDOMEN
LOCATION: ABDOMEN

## 2019-01-18 ASSESSMENT — PAIN DESCRIPTION - DESCRIPTORS
DESCRIPTORS: SORE
DESCRIPTORS: SORE

## 2019-01-18 ASSESSMENT — PAIN DESCRIPTION - PAIN TYPE
TYPE: SURGICAL PAIN
TYPE: SURGICAL PAIN

## 2019-01-19 PROCEDURE — 6370000000 HC RX 637 (ALT 250 FOR IP): Performed by: SURGERY

## 2019-01-19 PROCEDURE — 94150 VITAL CAPACITY TEST: CPT

## 2019-01-19 PROCEDURE — 6360000002 HC RX W HCPCS: Performed by: SURGERY

## 2019-01-19 PROCEDURE — 94761 N-INVAS EAR/PLS OXIMETRY MLT: CPT

## 2019-01-19 PROCEDURE — 1200000000 HC SEMI PRIVATE

## 2019-01-19 PROCEDURE — 2500000003 HC RX 250 WO HCPCS: Performed by: SURGERY

## 2019-01-19 PROCEDURE — S0028 INJECTION, FAMOTIDINE, 20 MG: HCPCS | Performed by: SURGERY

## 2019-01-19 PROCEDURE — 99024 POSTOP FOLLOW-UP VISIT: CPT | Performed by: SURGERY

## 2019-01-19 RX ORDER — DEXTROSE, SODIUM CHLORIDE, AND POTASSIUM CHLORIDE 5; .45; .15 G/100ML; G/100ML; G/100ML
1000 INJECTION INTRAVENOUS CONTINUOUS
Status: DISCONTINUED | OUTPATIENT
Start: 2019-01-19 | End: 2019-01-21 | Stop reason: HOSPADM

## 2019-01-19 RX ADMIN — POTASSIUM CHLORIDE, DEXTROSE MONOHYDRATE AND SODIUM CHLORIDE 1000 ML: 150; 5; 450 INJECTION, SOLUTION INTRAVENOUS at 01:50

## 2019-01-19 RX ADMIN — ACETAMINOPHEN 650 MG: 325 TABLET, FILM COATED ORAL at 02:00

## 2019-01-19 RX ADMIN — ACETAMINOPHEN 650 MG: 325 TABLET, FILM COATED ORAL at 22:01

## 2019-01-19 RX ADMIN — FAMOTIDINE 20 MG: 10 INJECTION, SOLUTION INTRAVENOUS at 21:41

## 2019-01-19 RX ADMIN — POTASSIUM CHLORIDE, DEXTROSE MONOHYDRATE AND SODIUM CHLORIDE 1000 ML: 150; 5; 450 INJECTION, SOLUTION INTRAVENOUS at 22:39

## 2019-01-19 RX ADMIN — LISINOPRIL 10 MG: 10 TABLET ORAL at 08:32

## 2019-01-19 RX ADMIN — FAMOTIDINE 20 MG: 10 INJECTION, SOLUTION INTRAVENOUS at 12:14

## 2019-01-19 RX ADMIN — TRIAMTERENE AND HYDROCHLOROTHIAZIDE 1 TABLET: 37.5; 25 TABLET ORAL at 08:32

## 2019-01-19 RX ADMIN — POTASSIUM CHLORIDE, DEXTROSE MONOHYDRATE AND SODIUM CHLORIDE 1000 ML: 150; 5; 450 INJECTION, SOLUTION INTRAVENOUS at 10:27

## 2019-01-19 RX ADMIN — ENOXAPARIN SODIUM 30 MG: 30 INJECTION SUBCUTANEOUS at 08:32

## 2019-01-19 RX ADMIN — ONDANSETRON 4 MG: 2 INJECTION INTRAMUSCULAR; INTRAVENOUS at 17:11

## 2019-01-19 ASSESSMENT — PAIN - FUNCTIONAL ASSESSMENT: PAIN_FUNCTIONAL_ASSESSMENT: PREVENTS OR INTERFERES SOME ACTIVE ACTIVITIES AND ADLS

## 2019-01-19 ASSESSMENT — PAIN DESCRIPTION - ONSET
ONSET: ON-GOING
ONSET: ON-GOING

## 2019-01-19 ASSESSMENT — PAIN DESCRIPTION - LOCATION
LOCATION: ABDOMEN
LOCATION: ABDOMEN

## 2019-01-19 ASSESSMENT — PAIN DESCRIPTION - PROGRESSION
CLINICAL_PROGRESSION: GRADUALLY WORSENING
CLINICAL_PROGRESSION: GRADUALLY WORSENING

## 2019-01-19 ASSESSMENT — PAIN DESCRIPTION - FREQUENCY
FREQUENCY: INTERMITTENT
FREQUENCY: INTERMITTENT

## 2019-01-19 ASSESSMENT — PAIN DESCRIPTION - DESCRIPTORS
DESCRIPTORS: SORE
DESCRIPTORS: SORE

## 2019-01-19 ASSESSMENT — PAIN SCALES - GENERAL
PAINLEVEL_OUTOF10: 4
PAINLEVEL_OUTOF10: 3
PAINLEVEL_OUTOF10: 4

## 2019-01-19 ASSESSMENT — PAIN DESCRIPTION - PAIN TYPE
TYPE: SURGICAL PAIN
TYPE: SURGICAL PAIN

## 2019-01-19 ASSESSMENT — PAIN DESCRIPTION - ORIENTATION
ORIENTATION: MID
ORIENTATION: MID

## 2019-01-20 PROCEDURE — 6360000002 HC RX W HCPCS: Performed by: SURGERY

## 2019-01-20 PROCEDURE — 6370000000 HC RX 637 (ALT 250 FOR IP): Performed by: SURGERY

## 2019-01-20 PROCEDURE — 94150 VITAL CAPACITY TEST: CPT

## 2019-01-20 PROCEDURE — 99024 POSTOP FOLLOW-UP VISIT: CPT | Performed by: SURGERY

## 2019-01-20 PROCEDURE — 2500000003 HC RX 250 WO HCPCS: Performed by: SURGERY

## 2019-01-20 PROCEDURE — 1200000000 HC SEMI PRIVATE

## 2019-01-20 PROCEDURE — S0028 INJECTION, FAMOTIDINE, 20 MG: HCPCS | Performed by: SURGERY

## 2019-01-20 PROCEDURE — 94761 N-INVAS EAR/PLS OXIMETRY MLT: CPT

## 2019-01-20 RX ORDER — HYDROCODONE BITARTRATE AND ACETAMINOPHEN 5; 325 MG/1; MG/1
1 TABLET ORAL EVERY 4 HOURS PRN
Status: DISCONTINUED | OUTPATIENT
Start: 2019-01-20 | End: 2019-01-21 | Stop reason: HOSPADM

## 2019-01-20 RX ADMIN — FAMOTIDINE 20 MG: 10 INJECTION, SOLUTION INTRAVENOUS at 09:24

## 2019-01-20 RX ADMIN — POTASSIUM CHLORIDE, DEXTROSE MONOHYDRATE AND SODIUM CHLORIDE 1000 ML: 150; 5; 450 INJECTION, SOLUTION INTRAVENOUS at 11:46

## 2019-01-20 RX ADMIN — TRIAMTERENE AND HYDROCHLOROTHIAZIDE 1 TABLET: 37.5; 25 TABLET ORAL at 09:24

## 2019-01-20 RX ADMIN — LISINOPRIL 10 MG: 10 TABLET ORAL at 09:24

## 2019-01-20 RX ADMIN — ACETAMINOPHEN 650 MG: 325 TABLET, FILM COATED ORAL at 21:01

## 2019-01-20 RX ADMIN — ACETAMINOPHEN 650 MG: 325 TABLET, FILM COATED ORAL at 13:24

## 2019-01-20 RX ADMIN — ACETAMINOPHEN 650 MG: 325 TABLET, FILM COATED ORAL at 03:24

## 2019-01-20 RX ADMIN — ENOXAPARIN SODIUM 30 MG: 30 INJECTION SUBCUTANEOUS at 09:24

## 2019-01-20 RX ADMIN — FAMOTIDINE 20 MG: 10 INJECTION, SOLUTION INTRAVENOUS at 21:01

## 2019-01-20 ASSESSMENT — PAIN DESCRIPTION - DESCRIPTORS
DESCRIPTORS: HEADACHE
DESCRIPTORS: ACHING

## 2019-01-20 ASSESSMENT — PAIN SCALES - GENERAL
PAINLEVEL_OUTOF10: 3

## 2019-01-20 ASSESSMENT — PAIN DESCRIPTION - FREQUENCY
FREQUENCY: INTERMITTENT
FREQUENCY: INTERMITTENT

## 2019-01-20 ASSESSMENT — PAIN DESCRIPTION - LOCATION
LOCATION: ABDOMEN
LOCATION: HEAD

## 2019-01-20 ASSESSMENT — PAIN DESCRIPTION - ORIENTATION: ORIENTATION: MID

## 2019-01-20 ASSESSMENT — PAIN - FUNCTIONAL ASSESSMENT: PAIN_FUNCTIONAL_ASSESSMENT: ACTIVITIES ARE NOT PREVENTED

## 2019-01-20 ASSESSMENT — PAIN DESCRIPTION - ONSET
ONSET: GRADUAL
ONSET: GRADUAL

## 2019-01-20 ASSESSMENT — PAIN DESCRIPTION - PROGRESSION
CLINICAL_PROGRESSION: GRADUALLY WORSENING
CLINICAL_PROGRESSION: GRADUALLY WORSENING

## 2019-01-20 ASSESSMENT — PAIN DESCRIPTION - PAIN TYPE
TYPE: ACUTE PAIN
TYPE: SURGICAL PAIN

## 2019-01-21 VITALS
OXYGEN SATURATION: 95 % | HEART RATE: 79 BPM | RESPIRATION RATE: 16 BRPM | WEIGHT: 201.3 LBS | SYSTOLIC BLOOD PRESSURE: 126 MMHG | HEIGHT: 62 IN | TEMPERATURE: 97.8 F | DIASTOLIC BLOOD PRESSURE: 57 MMHG | BODY MASS INDEX: 37.04 KG/M2

## 2019-01-21 PROCEDURE — 99024 POSTOP FOLLOW-UP VISIT: CPT | Performed by: NURSE PRACTITIONER

## 2019-01-21 PROCEDURE — S0028 INJECTION, FAMOTIDINE, 20 MG: HCPCS | Performed by: SURGERY

## 2019-01-21 PROCEDURE — 2500000003 HC RX 250 WO HCPCS: Performed by: SURGERY

## 2019-01-21 PROCEDURE — 6360000002 HC RX W HCPCS: Performed by: SURGERY

## 2019-01-21 PROCEDURE — 94150 VITAL CAPACITY TEST: CPT

## 2019-01-21 PROCEDURE — 6370000000 HC RX 637 (ALT 250 FOR IP): Performed by: SURGERY

## 2019-01-21 RX ORDER — HYDROCODONE BITARTRATE AND ACETAMINOPHEN 5; 325 MG/1; MG/1
1 TABLET ORAL EVERY 6 HOURS PRN
Qty: 25 TABLET | Refills: 0 | Status: SHIPPED | OUTPATIENT
Start: 2019-01-21 | End: 2019-01-28

## 2019-01-21 RX ORDER — FAMOTIDINE 20 MG/1
20 TABLET, FILM COATED ORAL 2 TIMES DAILY
Qty: 60 TABLET | Refills: 3 | Status: SHIPPED | OUTPATIENT
Start: 2019-01-21 | End: 2019-06-24 | Stop reason: SDUPTHER

## 2019-01-21 RX ADMIN — HYDROCODONE BITARTRATE AND ACETAMINOPHEN 1 TABLET: 5; 325 TABLET ORAL at 11:20

## 2019-01-21 RX ADMIN — LISINOPRIL 10 MG: 10 TABLET ORAL at 11:21

## 2019-01-21 RX ADMIN — FAMOTIDINE 20 MG: 10 INJECTION, SOLUTION INTRAVENOUS at 11:21

## 2019-01-21 RX ADMIN — TRIAMTERENE AND HYDROCHLOROTHIAZIDE 1 TABLET: 37.5; 25 TABLET ORAL at 11:20

## 2019-01-21 RX ADMIN — ACETAMINOPHEN 650 MG: 325 TABLET, FILM COATED ORAL at 04:06

## 2019-01-21 RX ADMIN — ENOXAPARIN SODIUM 30 MG: 30 INJECTION SUBCUTANEOUS at 11:21

## 2019-01-21 RX ADMIN — POTASSIUM CHLORIDE, DEXTROSE MONOHYDRATE AND SODIUM CHLORIDE 1000 ML: 150; 5; 450 INJECTION, SOLUTION INTRAVENOUS at 01:09

## 2019-01-21 ASSESSMENT — PAIN - FUNCTIONAL ASSESSMENT: PAIN_FUNCTIONAL_ASSESSMENT: ACTIVITIES ARE NOT PREVENTED

## 2019-01-21 ASSESSMENT — PAIN DESCRIPTION - DESCRIPTORS: DESCRIPTORS: ACHING

## 2019-01-21 ASSESSMENT — PAIN SCALES - GENERAL
PAINLEVEL_OUTOF10: 3
PAINLEVEL_OUTOF10: 3

## 2019-01-21 ASSESSMENT — PAIN DESCRIPTION - PROGRESSION: CLINICAL_PROGRESSION: GRADUALLY WORSENING

## 2019-01-21 ASSESSMENT — PAIN DESCRIPTION - ORIENTATION: ORIENTATION: MID

## 2019-01-21 ASSESSMENT — PAIN DESCRIPTION - LOCATION: LOCATION: ABDOMEN

## 2019-01-21 ASSESSMENT — PAIN DESCRIPTION - FREQUENCY: FREQUENCY: INTERMITTENT

## 2019-01-21 ASSESSMENT — PAIN DESCRIPTION - PAIN TYPE: TYPE: SURGICAL PAIN

## 2019-01-21 ASSESSMENT — PAIN DESCRIPTION - ONSET: ONSET: ON-GOING

## 2019-01-31 ENCOUNTER — OFFICE VISIT (OUTPATIENT)
Dept: SURGERY | Age: 74
End: 2019-01-31

## 2019-01-31 VITALS
HEART RATE: 105 BPM | BODY MASS INDEX: 34.42 KG/M2 | DIASTOLIC BLOOD PRESSURE: 60 MMHG | SYSTOLIC BLOOD PRESSURE: 102 MMHG | WEIGHT: 188.2 LBS | RESPIRATION RATE: 14 BRPM

## 2019-01-31 DIAGNOSIS — Z09 POSTOP CHECK: ICD-10-CM

## 2019-01-31 DIAGNOSIS — C18.7 MALIGNANT NEOPLASM OF SIGMOID COLON (HCC): Primary | ICD-10-CM

## 2019-01-31 PROCEDURE — 99024 POSTOP FOLLOW-UP VISIT: CPT | Performed by: SURGERY

## 2019-02-11 ENCOUNTER — TELEPHONE (OUTPATIENT)
Dept: SURGERY | Age: 74
End: 2019-02-11

## 2019-02-12 ENCOUNTER — OFFICE VISIT (OUTPATIENT)
Dept: SURGERY | Age: 74
End: 2019-02-12

## 2019-02-12 VITALS — OXYGEN SATURATION: 99 % | DIASTOLIC BLOOD PRESSURE: 68 MMHG | HEART RATE: 99 BPM | SYSTOLIC BLOOD PRESSURE: 112 MMHG

## 2019-02-12 DIAGNOSIS — Z09 POSTOP CHECK: ICD-10-CM

## 2019-02-12 DIAGNOSIS — C18.7 MALIGNANT NEOPLASM OF SIGMOID COLON (HCC): Primary | ICD-10-CM

## 2019-02-12 PROCEDURE — 99024 POSTOP FOLLOW-UP VISIT: CPT | Performed by: SURGERY

## 2019-02-12 RX ORDER — ZOLPIDEM TARTRATE 5 MG/1
TABLET ORAL NIGHTLY PRN
Refills: 0 | COMMUNITY
Start: 2019-02-07 | End: 2019-12-02

## 2019-02-14 ENCOUNTER — HOSPITAL ENCOUNTER (OUTPATIENT)
Dept: CT IMAGING | Age: 74
Discharge: HOME | End: 2019-02-14
Payer: MEDICARE

## 2019-02-14 DIAGNOSIS — C18.7 MALIGNANT NEOPLASM OF SIGMOID COLON (HCC): ICD-10-CM

## 2019-02-14 LAB
GFR AFRICAN AMERICAN: 33 ML/MIN/1.73M2
GFR NON-AFRICAN AMERICAN: 27 ML/MIN/1.73M2
POC CREATININE: 1.8 MG/DL (ref 0.6–1.1)

## 2019-02-14 PROCEDURE — 71250 CT THORAX DX C-: CPT

## 2019-02-14 PROCEDURE — 6360000004 HC RX CONTRAST MEDICATION: Performed by: INTERNAL MEDICINE

## 2019-02-14 PROCEDURE — 74176 CT ABD & PELVIS W/O CONTRAST: CPT

## 2019-02-14 RX ADMIN — IOHEXOL 50 ML: 240 INJECTION, SOLUTION INTRATHECAL; INTRAVASCULAR; INTRAVENOUS; ORAL at 10:07

## 2019-02-18 ENCOUNTER — TELEPHONE (OUTPATIENT)
Dept: SURGERY | Age: 74
End: 2019-02-18

## 2019-02-20 ENCOUNTER — APPOINTMENT (OUTPATIENT)
Dept: GENERAL RADIOLOGY | Age: 74
End: 2019-02-20
Attending: SURGERY
Payer: MEDICARE

## 2019-02-20 ENCOUNTER — ANESTHESIA (OUTPATIENT)
Dept: OPERATING ROOM | Age: 74
End: 2019-02-20
Payer: MEDICARE

## 2019-02-20 ENCOUNTER — ANESTHESIA EVENT (OUTPATIENT)
Dept: OPERATING ROOM | Age: 74
End: 2019-02-20
Payer: MEDICARE

## 2019-02-20 ENCOUNTER — HOSPITAL ENCOUNTER (OUTPATIENT)
Age: 74
Setting detail: OUTPATIENT SURGERY
Discharge: HOME | End: 2019-02-20
Attending: SURGERY | Admitting: SURGERY
Payer: MEDICARE

## 2019-02-20 VITALS
SYSTOLIC BLOOD PRESSURE: 140 MMHG | HEIGHT: 60 IN | TEMPERATURE: 98.1 F | HEART RATE: 68 BPM | OXYGEN SATURATION: 96 % | BODY MASS INDEX: 36.91 KG/M2 | WEIGHT: 188 LBS | RESPIRATION RATE: 16 BRPM | DIASTOLIC BLOOD PRESSURE: 63 MMHG

## 2019-02-20 VITALS
DIASTOLIC BLOOD PRESSURE: 59 MMHG | SYSTOLIC BLOOD PRESSURE: 102 MMHG | RESPIRATION RATE: 7 BRPM | OXYGEN SATURATION: 100 %

## 2019-02-20 LAB — GLUCOSE BLD-MCNC: 143 MG/DL (ref 70–99)

## 2019-02-20 PROCEDURE — 6360000002 HC RX W HCPCS: Performed by: SURGERY

## 2019-02-20 PROCEDURE — 36561 INSERT TUNNELED CV CATH: CPT | Performed by: SURGERY

## 2019-02-20 PROCEDURE — 2580000003 HC RX 258: Performed by: ANESTHESIOLOGY

## 2019-02-20 PROCEDURE — 7100000011 HC PHASE II RECOVERY - ADDTL 15 MIN: Performed by: SURGERY

## 2019-02-20 PROCEDURE — 2580000003 HC RX 258: Performed by: SURGERY

## 2019-02-20 PROCEDURE — 2580000003 HC RX 258: Performed by: NURSE ANESTHETIST, CERTIFIED REGISTERED

## 2019-02-20 PROCEDURE — 3600000003 HC SURGERY LEVEL 3 BASE: Performed by: SURGERY

## 2019-02-20 PROCEDURE — 2709999900 HC NON-CHARGEABLE SUPPLY: Performed by: SURGERY

## 2019-02-20 PROCEDURE — C1788 PORT, INDWELLING, IMP: HCPCS | Performed by: SURGERY

## 2019-02-20 PROCEDURE — 2500000003 HC RX 250 WO HCPCS: Performed by: NURSE ANESTHETIST, CERTIFIED REGISTERED

## 2019-02-20 PROCEDURE — 7100000010 HC PHASE II RECOVERY - FIRST 15 MIN: Performed by: SURGERY

## 2019-02-20 PROCEDURE — 3700000000 HC ANESTHESIA ATTENDED CARE: Performed by: SURGERY

## 2019-02-20 PROCEDURE — 3600000013 HC SURGERY LEVEL 3 ADDTL 15MIN: Performed by: SURGERY

## 2019-02-20 PROCEDURE — 2500000003 HC RX 250 WO HCPCS: Performed by: SURGERY

## 2019-02-20 PROCEDURE — 82962 GLUCOSE BLOOD TEST: CPT

## 2019-02-20 PROCEDURE — 6360000002 HC RX W HCPCS: Performed by: NURSE ANESTHETIST, CERTIFIED REGISTERED

## 2019-02-20 PROCEDURE — 3700000001 HC ADD 15 MINUTES (ANESTHESIA): Performed by: SURGERY

## 2019-02-20 PROCEDURE — 71045 X-RAY EXAM CHEST 1 VIEW: CPT

## 2019-02-20 DEVICE — PORT INFUS OD2.7MM ID1.5MM INTRO 8FR TI POLYUR CATH DETACH CT80STPD] ANGIODYNAMICS INC]: Type: IMPLANTABLE DEVICE | Site: SHOULDER | Status: FUNCTIONAL

## 2019-02-20 RX ORDER — SODIUM CHLORIDE 9 MG/ML
INJECTION, SOLUTION INTRAVENOUS CONTINUOUS PRN
Status: COMPLETED | OUTPATIENT
Start: 2019-02-20 | End: 2019-02-20

## 2019-02-20 RX ORDER — CEFAZOLIN SODIUM 1 G/50ML
1 INJECTION, SOLUTION INTRAVENOUS ONCE
Status: COMPLETED | OUTPATIENT
Start: 2019-02-20 | End: 2019-02-20

## 2019-02-20 RX ORDER — FENTANYL CITRATE 50 UG/ML
INJECTION, SOLUTION INTRAMUSCULAR; INTRAVENOUS PRN
Status: DISCONTINUED | OUTPATIENT
Start: 2019-02-20 | End: 2019-02-20 | Stop reason: SDUPTHER

## 2019-02-20 RX ORDER — SODIUM CHLORIDE, SODIUM LACTATE, POTASSIUM CHLORIDE, CALCIUM CHLORIDE 600; 310; 30; 20 MG/100ML; MG/100ML; MG/100ML; MG/100ML
INJECTION, SOLUTION INTRAVENOUS CONTINUOUS PRN
Status: DISCONTINUED | OUTPATIENT
Start: 2019-02-20 | End: 2019-02-20 | Stop reason: SDUPTHER

## 2019-02-20 RX ORDER — LIDOCAINE HYDROCHLORIDE 10 MG/ML
INJECTION, SOLUTION EPIDURAL; INFILTRATION; INTRACAUDAL; PERINEURAL
Status: COMPLETED | OUTPATIENT
Start: 2019-02-20 | End: 2019-02-20

## 2019-02-20 RX ORDER — LIDOCAINE HYDROCHLORIDE 20 MG/ML
INJECTION, SOLUTION INFILTRATION; PERINEURAL PRN
Status: DISCONTINUED | OUTPATIENT
Start: 2019-02-20 | End: 2019-02-20 | Stop reason: SDUPTHER

## 2019-02-20 RX ORDER — ONDANSETRON 2 MG/ML
INJECTION INTRAMUSCULAR; INTRAVENOUS PRN
Status: DISCONTINUED | OUTPATIENT
Start: 2019-02-20 | End: 2019-02-20 | Stop reason: SDUPTHER

## 2019-02-20 RX ORDER — SODIUM CHLORIDE, SODIUM LACTATE, POTASSIUM CHLORIDE, CALCIUM CHLORIDE 600; 310; 30; 20 MG/100ML; MG/100ML; MG/100ML; MG/100ML
INJECTION, SOLUTION INTRAVENOUS ONCE
Status: COMPLETED | OUTPATIENT
Start: 2019-02-20 | End: 2019-02-20

## 2019-02-20 RX ORDER — PROPOFOL 10 MG/ML
INJECTION, EMULSION INTRAVENOUS PRN
Status: DISCONTINUED | OUTPATIENT
Start: 2019-02-20 | End: 2019-02-20 | Stop reason: SDUPTHER

## 2019-02-20 RX ORDER — HEPARIN SODIUM 5000 [USP'U]/ML
INJECTION, SOLUTION INTRAVENOUS; SUBCUTANEOUS
Status: COMPLETED | OUTPATIENT
Start: 2019-02-20 | End: 2019-02-20

## 2019-02-20 RX ADMIN — CEFAZOLIN SODIUM 1 G: 1 INJECTION, SOLUTION INTRAVENOUS at 08:12

## 2019-02-20 RX ADMIN — SODIUM CHLORIDE, POTASSIUM CHLORIDE, SODIUM LACTATE AND CALCIUM CHLORIDE: 600; 310; 30; 20 INJECTION, SOLUTION INTRAVENOUS at 06:34

## 2019-02-20 RX ADMIN — SODIUM CHLORIDE, POTASSIUM CHLORIDE, SODIUM LACTATE AND CALCIUM CHLORIDE: 600; 310; 30; 20 INJECTION, SOLUTION INTRAVENOUS at 07:58

## 2019-02-20 RX ADMIN — PROPOFOL 30 MG: 10 INJECTION, EMULSION INTRAVENOUS at 08:09

## 2019-02-20 RX ADMIN — LIDOCAINE HYDROCHLORIDE 100 MG: 20 INJECTION, SOLUTION INFILTRATION; PERINEURAL at 08:09

## 2019-02-20 RX ADMIN — ONDANSETRON 4 MG: 2 INJECTION INTRAMUSCULAR; INTRAVENOUS at 08:10

## 2019-02-20 RX ADMIN — FENTANYL CITRATE 50 MCG: 50 INJECTION INTRAMUSCULAR; INTRAVENOUS at 08:08

## 2019-02-20 ASSESSMENT — PULMONARY FUNCTION TESTS
PIF_VALUE: 1
PIF_VALUE: 14
PIF_VALUE: 1
PIF_VALUE: 6
PIF_VALUE: 1
PIF_VALUE: 0
PIF_VALUE: 1

## 2019-02-20 ASSESSMENT — PAIN SCALES - GENERAL
PAINLEVEL_OUTOF10: 0

## 2019-02-22 ENCOUNTER — HOSPITAL ENCOUNTER (OUTPATIENT)
Age: 74
Setting detail: SPECIMEN
Discharge: HOME | End: 2019-02-22
Payer: MEDICARE

## 2019-02-22 LAB
ALBUMIN SERPL-MCNC: 4.5 GM/DL (ref 3.4–5)
ALP BLD-CCNC: 108 IU/L (ref 40–129)
ALT SERPL-CCNC: 14 U/L (ref 10–40)
ANION GAP SERPL CALCULATED.3IONS-SCNC: 11 MMOL/L (ref 4–16)
AST SERPL-CCNC: 19 IU/L (ref 15–37)
BILIRUB SERPL-MCNC: 0.2 MG/DL (ref 0–1)
BUN BLDV-MCNC: 27 MG/DL (ref 6–23)
CALCIUM SERPL-MCNC: 9.9 MG/DL (ref 8.3–10.6)
CHLORIDE BLD-SCNC: 103 MMOL/L (ref 99–110)
CO2: 24 MMOL/L (ref 21–32)
CREAT SERPL-MCNC: 1.7 MG/DL (ref 0.6–1.1)
GFR AFRICAN AMERICAN: 36 ML/MIN/1.73M2
GFR NON-AFRICAN AMERICAN: 29 ML/MIN/1.73M2
GLUCOSE BLD-MCNC: 118 MG/DL (ref 70–99)
POTASSIUM SERPL-SCNC: 5.7 MMOL/L (ref 3.5–5.1)
SODIUM BLD-SCNC: 138 MMOL/L (ref 135–145)
TOTAL PROTEIN: 7.2 GM/DL (ref 6.4–8.2)

## 2019-02-22 PROCEDURE — 80053 COMPREHEN METABOLIC PANEL: CPT

## 2019-02-26 ENCOUNTER — HOSPITAL ENCOUNTER (EMERGENCY)
Age: 74
Discharge: HOME | End: 2019-02-27
Payer: MEDICARE

## 2019-02-26 DIAGNOSIS — T78.40XA ALLERGIC REACTION, INITIAL ENCOUNTER: Primary | ICD-10-CM

## 2019-02-26 PROCEDURE — 99283 EMERGENCY DEPT VISIT LOW MDM: CPT

## 2019-02-27 VITALS
DIASTOLIC BLOOD PRESSURE: 87 MMHG | HEART RATE: 76 BPM | SYSTOLIC BLOOD PRESSURE: 149 MMHG | HEIGHT: 60 IN | RESPIRATION RATE: 16 BRPM | BODY MASS INDEX: 36.71 KG/M2 | WEIGHT: 187 LBS | OXYGEN SATURATION: 98 % | TEMPERATURE: 97.8 F

## 2019-02-28 ENCOUNTER — OFFICE VISIT (OUTPATIENT)
Dept: SURGERY | Age: 74
End: 2019-02-28

## 2019-02-28 VITALS — SYSTOLIC BLOOD PRESSURE: 136 MMHG | HEART RATE: 93 BPM | RESPIRATION RATE: 13 BRPM | DIASTOLIC BLOOD PRESSURE: 66 MMHG

## 2019-02-28 DIAGNOSIS — Z09 POSTOP CHECK: ICD-10-CM

## 2019-02-28 DIAGNOSIS — Z45.2 ENCOUNTER FOR CARE RELATED TO VASCULAR ACCESS PORT: Primary | ICD-10-CM

## 2019-02-28 PROCEDURE — 99024 POSTOP FOLLOW-UP VISIT: CPT | Performed by: SURGERY

## 2019-03-11 ENCOUNTER — HOSPITAL ENCOUNTER (OUTPATIENT)
Age: 74
Setting detail: OBSERVATION
Discharge: HOME | End: 2019-03-13
Attending: EMERGENCY MEDICINE | Admitting: HOSPITALIST
Payer: MEDICARE

## 2019-03-11 DIAGNOSIS — I82.402 ACUTE DEEP VEIN THROMBOSIS (DVT) OF LEFT LOWER EXTREMITY, UNSPECIFIED VEIN (HCC): Primary | ICD-10-CM

## 2019-03-11 PROCEDURE — 99285 EMERGENCY DEPT VISIT HI MDM: CPT

## 2019-03-12 ENCOUNTER — APPOINTMENT (OUTPATIENT)
Dept: ULTRASOUND IMAGING | Age: 74
End: 2019-03-12
Payer: MEDICARE

## 2019-03-12 PROBLEM — I82.A12 DVT OF AXILLARY VEIN, ACUTE LEFT (HCC): Status: ACTIVE | Noted: 2019-03-12

## 2019-03-12 LAB
ANION GAP SERPL CALCULATED.3IONS-SCNC: 10 MMOL/L (ref 4–16)
ANION GAP SERPL CALCULATED.3IONS-SCNC: 11 MMOL/L (ref 4–16)
APTT: 24.1 SECONDS (ref 21.2–33)
APTT: >240 SECONDS (ref 21.2–33)
BASOPHILS ABSOLUTE: 0 K/CU MM
BASOPHILS RELATIVE PERCENT: 0.4 % (ref 0–1)
BUN BLDV-MCNC: 25 MG/DL (ref 6–23)
BUN BLDV-MCNC: 27 MG/DL (ref 6–23)
CALCIUM SERPL-MCNC: 9.7 MG/DL (ref 8.3–10.6)
CALCIUM SERPL-MCNC: 9.8 MG/DL (ref 8.3–10.6)
CHLORIDE BLD-SCNC: 95 MMOL/L (ref 99–110)
CHLORIDE BLD-SCNC: 98 MMOL/L (ref 99–110)
CO2: 23 MMOL/L (ref 21–32)
CO2: 23 MMOL/L (ref 21–32)
CREAT SERPL-MCNC: 1.5 MG/DL (ref 0.6–1.1)
CREAT SERPL-MCNC: 1.5 MG/DL (ref 0.6–1.1)
DIFFERENTIAL TYPE: ABNORMAL
EOSINOPHILS ABSOLUTE: 0 K/CU MM
EOSINOPHILS RELATIVE PERCENT: 0 % (ref 0–3)
GFR AFRICAN AMERICAN: 41 ML/MIN/1.73M2
GFR AFRICAN AMERICAN: 41 ML/MIN/1.73M2
GFR NON-AFRICAN AMERICAN: 34 ML/MIN/1.73M2
GFR NON-AFRICAN AMERICAN: 34 ML/MIN/1.73M2
GLUCOSE BLD-MCNC: 192 MG/DL (ref 70–99)
GLUCOSE BLD-MCNC: 199 MG/DL (ref 70–99)
HCT VFR BLD CALC: 32.6 % (ref 37–47)
HCT VFR BLD CALC: 34 % (ref 37–47)
HEMOGLOBIN: 10.2 GM/DL (ref 12.5–16)
HEMOGLOBIN: 10.4 GM/DL (ref 12.5–16)
IMMATURE NEUTROPHIL %: 0.4 % (ref 0–0.43)
INR BLD: 1.09 INDEX
LYMPHOCYTES ABSOLUTE: 0.2 K/CU MM
LYMPHOCYTES RELATIVE PERCENT: 3.7 % (ref 24–44)
MCH RBC QN AUTO: 25.8 PG (ref 27–31)
MCH RBC QN AUTO: 25.9 PG (ref 27–31)
MCHC RBC AUTO-ENTMCNC: 30.6 % (ref 32–36)
MCHC RBC AUTO-ENTMCNC: 31.3 % (ref 32–36)
MCV RBC AUTO: 82.7 FL (ref 78–100)
MCV RBC AUTO: 84.4 FL (ref 78–100)
MONOCYTES ABSOLUTE: 0.2 K/CU MM
MONOCYTES RELATIVE PERCENT: 4.5 % (ref 0–4)
NUCLEATED RBC %: 0 %
PDW BLD-RTO: 16.6 % (ref 11.7–14.9)
PDW BLD-RTO: 16.7 % (ref 11.7–14.9)
PLATELET # BLD: 124 K/CU MM (ref 140–440)
PLATELET # BLD: 155 K/CU MM (ref 140–440)
PMV BLD AUTO: 10.2 FL (ref 7.5–11.1)
PMV BLD AUTO: 10.5 FL (ref 7.5–11.1)
POTASSIUM SERPL-SCNC: 5 MMOL/L (ref 3.5–5.1)
POTASSIUM SERPL-SCNC: 5 MMOL/L (ref 3.5–5.1)
PROTHROMBIN TIME: 12.6 SECONDS (ref 9.12–12.5)
RBC # BLD: 3.94 M/CU MM (ref 4.2–5.4)
RBC # BLD: 4.03 M/CU MM (ref 4.2–5.4)
SEGMENTED NEUTROPHILS ABSOLUTE COUNT: 4.6 K/CU MM
SEGMENTED NEUTROPHILS RELATIVE PERCENT: 91 % (ref 36–66)
SODIUM BLD-SCNC: 128 MMOL/L (ref 135–145)
SODIUM BLD-SCNC: 132 MMOL/L (ref 135–145)
TOTAL IMMATURE NEUTOROPHIL: 0.02 K/CU MM
TOTAL NUCLEATED RBC: 0 K/CU MM
WBC # BLD: 5.1 K/CU MM (ref 4–10.5)
WBC # BLD: 5.8 K/CU MM (ref 4–10.5)

## 2019-03-12 PROCEDURE — 96365 THER/PROPH/DIAG IV INF INIT: CPT

## 2019-03-12 PROCEDURE — 85027 COMPLETE CBC AUTOMATED: CPT

## 2019-03-12 PROCEDURE — 93971 EXTREMITY STUDY: CPT

## 2019-03-12 PROCEDURE — G0378 HOSPITAL OBSERVATION PER HR: HCPCS

## 2019-03-12 PROCEDURE — 80048 BASIC METABOLIC PNL TOTAL CA: CPT

## 2019-03-12 PROCEDURE — 96366 THER/PROPH/DIAG IV INF ADDON: CPT

## 2019-03-12 PROCEDURE — 6370000000 HC RX 637 (ALT 250 FOR IP): Performed by: HOSPITALIST

## 2019-03-12 PROCEDURE — 2580000003 HC RX 258: Performed by: HOSPITALIST

## 2019-03-12 PROCEDURE — 85730 THROMBOPLASTIN TIME PARTIAL: CPT

## 2019-03-12 PROCEDURE — 85610 PROTHROMBIN TIME: CPT

## 2019-03-12 PROCEDURE — 6360000002 HC RX W HCPCS: Performed by: INTERNAL MEDICINE

## 2019-03-12 PROCEDURE — 96376 TX/PRO/DX INJ SAME DRUG ADON: CPT

## 2019-03-12 PROCEDURE — 6360000002 HC RX W HCPCS: Performed by: PHYSICIAN ASSISTANT

## 2019-03-12 PROCEDURE — 6370000000 HC RX 637 (ALT 250 FOR IP): Performed by: INTERNAL MEDICINE

## 2019-03-12 PROCEDURE — 85025 COMPLETE CBC W/AUTO DIFF WBC: CPT

## 2019-03-12 PROCEDURE — 36415 COLL VENOUS BLD VENIPUNCTURE: CPT

## 2019-03-12 RX ORDER — ATORVASTATIN CALCIUM 40 MG/1
40 TABLET, FILM COATED ORAL NIGHTLY
Status: DISCONTINUED | OUTPATIENT
Start: 2019-03-12 | End: 2019-03-13 | Stop reason: HOSPADM

## 2019-03-12 RX ORDER — FERROUS SULFATE 325(65) MG
325 TABLET ORAL 2 TIMES DAILY
Status: DISCONTINUED | OUTPATIENT
Start: 2019-03-12 | End: 2019-03-13 | Stop reason: HOSPADM

## 2019-03-12 RX ORDER — LANOLIN ALCOHOL/MO/W.PET/CERES
3 CREAM (GRAM) TOPICAL NIGHTLY PRN
Status: DISCONTINUED | OUTPATIENT
Start: 2019-03-12 | End: 2019-03-13 | Stop reason: HOSPADM

## 2019-03-12 RX ORDER — SODIUM CHLORIDE 0.9 % (FLUSH) 0.9 %
10 SYRINGE (ML) INJECTION EVERY 12 HOURS SCHEDULED
Status: DISCONTINUED | OUTPATIENT
Start: 2019-03-12 | End: 2019-03-13 | Stop reason: HOSPADM

## 2019-03-12 RX ORDER — ONDANSETRON 4 MG/1
8 TABLET, ORALLY DISINTEGRATING ORAL EVERY 8 HOURS PRN
Status: DISCONTINUED | OUTPATIENT
Start: 2019-03-12 | End: 2019-03-13 | Stop reason: HOSPADM

## 2019-03-12 RX ORDER — SODIUM CHLORIDE 9 MG/ML
INJECTION, SOLUTION INTRAVENOUS CONTINUOUS
Status: DISCONTINUED | OUTPATIENT
Start: 2019-03-12 | End: 2019-03-12

## 2019-03-12 RX ORDER — HEPARIN SODIUM 1000 [USP'U]/ML
40 INJECTION, SOLUTION INTRAVENOUS; SUBCUTANEOUS PRN
Status: DISCONTINUED | OUTPATIENT
Start: 2019-03-12 | End: 2019-03-12 | Stop reason: ALTCHOICE

## 2019-03-12 RX ORDER — ZOLPIDEM TARTRATE 5 MG/1
5 TABLET ORAL NIGHTLY
Status: DISCONTINUED | OUTPATIENT
Start: 2019-03-12 | End: 2019-03-13 | Stop reason: HOSPADM

## 2019-03-12 RX ORDER — HEPARIN SODIUM 1000 [USP'U]/ML
80 INJECTION, SOLUTION INTRAVENOUS; SUBCUTANEOUS ONCE
Status: COMPLETED | OUTPATIENT
Start: 2019-03-12 | End: 2019-03-12

## 2019-03-12 RX ORDER — HEPARIN SODIUM 10000 [USP'U]/100ML
18 INJECTION, SOLUTION INTRAVENOUS CONTINUOUS
Status: DISCONTINUED | OUTPATIENT
Start: 2019-03-12 | End: 2019-03-12

## 2019-03-12 RX ORDER — DEXAMETHASONE 1 MG
1 TABLET ORAL 2 TIMES DAILY WITH MEALS
COMMUNITY
End: 2019-06-24 | Stop reason: CLARIF

## 2019-03-12 RX ORDER — HEPARIN SODIUM 1000 [USP'U]/ML
80 INJECTION, SOLUTION INTRAVENOUS; SUBCUTANEOUS PRN
Status: DISCONTINUED | OUTPATIENT
Start: 2019-03-12 | End: 2019-03-12

## 2019-03-12 RX ORDER — FAMOTIDINE 20 MG/1
20 TABLET, FILM COATED ORAL 2 TIMES DAILY
Status: DISCONTINUED | OUTPATIENT
Start: 2019-03-12 | End: 2019-03-13 | Stop reason: HOSPADM

## 2019-03-12 RX ORDER — ONDANSETRON 2 MG/ML
4 INJECTION INTRAMUSCULAR; INTRAVENOUS EVERY 6 HOURS PRN
Status: DISCONTINUED | OUTPATIENT
Start: 2019-03-12 | End: 2019-03-13 | Stop reason: HOSPADM

## 2019-03-12 RX ORDER — DIPHENHYDRAMINE HCL 25 MG
25 TABLET ORAL NIGHTLY PRN
Status: DISCONTINUED | OUTPATIENT
Start: 2019-03-12 | End: 2019-03-13 | Stop reason: HOSPADM

## 2019-03-12 RX ORDER — DEXAMETHASONE 4 MG/1
4 TABLET ORAL DAILY
Status: COMPLETED | OUTPATIENT
Start: 2019-03-12 | End: 2019-03-12

## 2019-03-12 RX ORDER — MORPHINE SULFATE 4 MG/ML
2 INJECTION, SOLUTION INTRAMUSCULAR; INTRAVENOUS EVERY 4 HOURS PRN
Status: DISCONTINUED | OUTPATIENT
Start: 2019-03-12 | End: 2019-03-12

## 2019-03-12 RX ORDER — SODIUM CHLORIDE 0.9 % (FLUSH) 0.9 %
10 SYRINGE (ML) INJECTION PRN
Status: DISCONTINUED | OUTPATIENT
Start: 2019-03-12 | End: 2019-03-13 | Stop reason: HOSPADM

## 2019-03-12 RX ADMIN — SODIUM CHLORIDE, PRESERVATIVE FREE 10 ML: 5 INJECTION INTRAVENOUS at 20:54

## 2019-03-12 RX ADMIN — DEXAMETHASONE 4 MG: 4 TABLET ORAL at 09:16

## 2019-03-12 RX ADMIN — ONDANSETRON 8 MG: 4 TABLET, ORALLY DISINTEGRATING ORAL at 09:20

## 2019-03-12 RX ADMIN — SERTRALINE HYDROCHLORIDE 50 MG: 50 TABLET ORAL at 10:51

## 2019-03-12 RX ADMIN — HEPARIN SODIUM AND DEXTROSE 18 UNITS/KG/HR: 10000; 5 INJECTION INTRAVENOUS at 02:15

## 2019-03-12 RX ADMIN — FERROUS SULFATE TAB 325 MG (65 MG ELEMENTAL FE) 325 MG: 325 (65 FE) TAB at 09:05

## 2019-03-12 RX ADMIN — APIXABAN 10 MG: 5 TABLET, FILM COATED ORAL at 20:53

## 2019-03-12 RX ADMIN — SODIUM CHLORIDE, PRESERVATIVE FREE 10 ML: 5 INJECTION INTRAVENOUS at 09:05

## 2019-03-12 RX ADMIN — HEPARIN SODIUM 6710 UNITS: 1000 INJECTION, SOLUTION INTRAVENOUS; SUBCUTANEOUS at 02:04

## 2019-03-12 RX ADMIN — FAMOTIDINE 20 MG: 20 TABLET ORAL at 09:05

## 2019-03-12 RX ADMIN — FAMOTIDINE 20 MG: 20 TABLET ORAL at 20:53

## 2019-03-12 RX ADMIN — METOPROLOL TARTRATE 25 MG: 25 TABLET ORAL at 09:05

## 2019-03-12 RX ADMIN — SODIUM CHLORIDE: 9 INJECTION, SOLUTION INTRAVENOUS at 06:06

## 2019-03-12 RX ADMIN — DIPHENHYDRAMINE HCL 25 MG: 25 TABLET ORAL at 20:53

## 2019-03-12 RX ADMIN — FERROUS SULFATE TAB 325 MG (65 MG ELEMENTAL FE) 325 MG: 325 (65 FE) TAB at 20:53

## 2019-03-12 RX ADMIN — ATORVASTATIN CALCIUM 40 MG: 40 TABLET, FILM COATED ORAL at 20:53

## 2019-03-12 RX ADMIN — APIXABAN 10 MG: 5 TABLET, FILM COATED ORAL at 10:51

## 2019-03-12 RX ADMIN — METOPROLOL TARTRATE 25 MG: 25 TABLET ORAL at 20:53

## 2019-03-12 ASSESSMENT — PAIN SCALES - GENERAL
PAINLEVEL_OUTOF10: 0

## 2019-03-13 VITALS
BODY MASS INDEX: 36.36 KG/M2 | HEART RATE: 69 BPM | TEMPERATURE: 98.2 F | DIASTOLIC BLOOD PRESSURE: 60 MMHG | SYSTOLIC BLOOD PRESSURE: 124 MMHG | WEIGHT: 185.2 LBS | HEIGHT: 60 IN | RESPIRATION RATE: 16 BRPM | OXYGEN SATURATION: 94 %

## 2019-03-13 LAB
ANION GAP SERPL CALCULATED.3IONS-SCNC: 9 MMOL/L (ref 4–16)
BUN BLDV-MCNC: 31 MG/DL (ref 6–23)
CALCIUM SERPL-MCNC: 9 MG/DL (ref 8.3–10.6)
CHLORIDE BLD-SCNC: 101 MMOL/L (ref 99–110)
CO2: 23 MMOL/L (ref 21–32)
CREAT SERPL-MCNC: 1.7 MG/DL (ref 0.6–1.1)
GFR AFRICAN AMERICAN: 36 ML/MIN/1.73M2
GFR NON-AFRICAN AMERICAN: 29 ML/MIN/1.73M2
GLUCOSE BLD-MCNC: 162 MG/DL (ref 70–99)
HCT VFR BLD CALC: 31.9 % (ref 37–47)
HEMOGLOBIN: 9.8 GM/DL (ref 12.5–16)
MCH RBC QN AUTO: 26 PG (ref 27–31)
MCHC RBC AUTO-ENTMCNC: 30.7 % (ref 32–36)
MCV RBC AUTO: 84.6 FL (ref 78–100)
PDW BLD-RTO: 17.1 % (ref 11.7–14.9)
PLATELET # BLD: 179 K/CU MM (ref 140–440)
PMV BLD AUTO: 10.5 FL (ref 7.5–11.1)
POTASSIUM SERPL-SCNC: ABNORMAL MMOL/L (ref 3.5–5.1)
RBC # BLD: 3.77 M/CU MM (ref 4.2–5.4)
SODIUM BLD-SCNC: 133 MMOL/L (ref 135–145)
WBC # BLD: 5.6 K/CU MM (ref 4–10.5)

## 2019-03-13 PROCEDURE — 36415 COLL VENOUS BLD VENIPUNCTURE: CPT

## 2019-03-13 PROCEDURE — 80048 BASIC METABOLIC PNL TOTAL CA: CPT

## 2019-03-13 PROCEDURE — 6370000000 HC RX 637 (ALT 250 FOR IP): Performed by: HOSPITALIST

## 2019-03-13 PROCEDURE — 96375 TX/PRO/DX INJ NEW DRUG ADDON: CPT

## 2019-03-13 PROCEDURE — 6360000002 HC RX W HCPCS: Performed by: HOSPITALIST

## 2019-03-13 PROCEDURE — G0378 HOSPITAL OBSERVATION PER HR: HCPCS

## 2019-03-13 PROCEDURE — 85027 COMPLETE CBC AUTOMATED: CPT

## 2019-03-13 RX ORDER — TRIAMTERENE AND HYDROCHLOROTHIAZIDE 37.5; 25 MG/1; MG/1
1 TABLET ORAL EVERY MORNING
Status: DISCONTINUED | OUTPATIENT
Start: 2019-03-13 | End: 2019-03-13 | Stop reason: HOSPADM

## 2019-03-13 RX ORDER — SODIUM POLYSTYRENE SULFONATE 15 G/60ML
15 SUSPENSION ORAL; RECTAL ONCE
Status: COMPLETED | OUTPATIENT
Start: 2019-03-13 | End: 2019-03-13

## 2019-03-13 RX ADMIN — SERTRALINE HYDROCHLORIDE 50 MG: 50 TABLET ORAL at 09:13

## 2019-03-13 RX ADMIN — APIXABAN 10 MG: 5 TABLET, FILM COATED ORAL at 09:13

## 2019-03-13 RX ADMIN — FAMOTIDINE 20 MG: 20 TABLET ORAL at 09:13

## 2019-03-13 RX ADMIN — SODIUM POLYSTYRENE SULFONATE 15 G: 15 SUSPENSION ORAL; RECTAL at 11:23

## 2019-03-13 RX ADMIN — ONDANSETRON 4 MG: 2 INJECTION INTRAMUSCULAR; INTRAVENOUS at 09:18

## 2019-03-13 RX ADMIN — FERROUS SULFATE TAB 325 MG (65 MG ELEMENTAL FE) 325 MG: 325 (65 FE) TAB at 09:13

## 2019-03-13 RX ADMIN — TRIAMTERENE AND HYDROCHLOROTHIAZIDE 1 TABLET: 37.5; 25 TABLET ORAL at 09:13

## 2019-03-13 ASSESSMENT — PAIN SCALES - GENERAL
PAINLEVEL_OUTOF10: 0
PAINLEVEL_OUTOF10: 0

## 2019-03-18 ENCOUNTER — HOSPITAL ENCOUNTER (OUTPATIENT)
Age: 74
Setting detail: SPECIMEN
Discharge: HOME | End: 2019-03-18
Payer: MEDICARE

## 2019-03-18 LAB
ALBUMIN SERPL-MCNC: 3.8 GM/DL (ref 3.4–5)
ALP BLD-CCNC: 79 IU/L (ref 40–128)
ALT SERPL-CCNC: 18 U/L (ref 10–40)
ANION GAP SERPL CALCULATED.3IONS-SCNC: 12 MMOL/L (ref 4–16)
AST SERPL-CCNC: 21 IU/L (ref 15–37)
BILIRUB SERPL-MCNC: 0.2 MG/DL (ref 0–1)
BUN BLDV-MCNC: 22 MG/DL (ref 6–23)
CALCIUM SERPL-MCNC: 9.2 MG/DL (ref 8.3–10.6)
CHLORIDE BLD-SCNC: 83 MMOL/L (ref 99–110)
CO2: 26 MMOL/L (ref 21–32)
CREAT SERPL-MCNC: 1.5 MG/DL (ref 0.6–1.1)
GFR AFRICAN AMERICAN: 41 ML/MIN/1.73M2
GFR NON-AFRICAN AMERICAN: 34 ML/MIN/1.73M2
GLUCOSE BLD-MCNC: 179 MG/DL (ref 70–99)
MAGNESIUM: 1.2 MG/DL (ref 1.8–2.4)
PHOSPHORUS: 2.2 MG/DL (ref 2.5–4.9)
POTASSIUM SERPL-SCNC: 3.4 MMOL/L (ref 3.5–5.1)
SODIUM BLD-SCNC: 121 MMOL/L (ref 135–145)
TOTAL PROTEIN: 5.8 GM/DL (ref 6.4–8.2)
TSH HIGH SENSITIVITY: 3.95 UIU/ML (ref 0.27–4.2)

## 2019-03-18 PROCEDURE — 83735 ASSAY OF MAGNESIUM: CPT

## 2019-03-18 PROCEDURE — 84443 ASSAY THYROID STIM HORMONE: CPT

## 2019-03-18 PROCEDURE — 84100 ASSAY OF PHOSPHORUS: CPT

## 2019-03-18 PROCEDURE — 80053 COMPREHEN METABOLIC PANEL: CPT

## 2019-03-25 ENCOUNTER — HOSPITAL ENCOUNTER (OUTPATIENT)
Age: 74
Setting detail: SPECIMEN
Discharge: HOME | End: 2019-03-25
Payer: MEDICARE

## 2019-03-25 LAB
ALBUMIN SERPL-MCNC: 3.6 GM/DL (ref 3.4–5)
ALP BLD-CCNC: 81 IU/L (ref 40–128)
ALT SERPL-CCNC: 17 U/L (ref 10–40)
ANION GAP SERPL CALCULATED.3IONS-SCNC: 12 MMOL/L (ref 4–16)
AST SERPL-CCNC: 17 IU/L (ref 15–37)
BILIRUB SERPL-MCNC: 0.3 MG/DL (ref 0–1)
BUN BLDV-MCNC: 26 MG/DL (ref 6–23)
CALCIUM SERPL-MCNC: 9.4 MG/DL (ref 8.3–10.6)
CHLORIDE BLD-SCNC: 87 MMOL/L (ref 99–110)
CO2: 28 MMOL/L (ref 21–32)
CREAT SERPL-MCNC: 1.5 MG/DL (ref 0.6–1.1)
GFR AFRICAN AMERICAN: 41 ML/MIN/1.73M2
GFR NON-AFRICAN AMERICAN: 34 ML/MIN/1.73M2
GLUCOSE BLD-MCNC: 183 MG/DL (ref 70–99)
MAGNESIUM: 1.3 MG/DL (ref 1.8–2.4)
POTASSIUM SERPL-SCNC: 4.5 MMOL/L (ref 3.5–5.1)
SODIUM BLD-SCNC: 127 MMOL/L (ref 135–145)
TOTAL PROTEIN: 5.7 GM/DL (ref 6.4–8.2)

## 2019-03-25 PROCEDURE — 83735 ASSAY OF MAGNESIUM: CPT

## 2019-03-25 PROCEDURE — 80053 COMPREHEN METABOLIC PANEL: CPT

## 2019-04-08 ENCOUNTER — HOSPITAL ENCOUNTER (OUTPATIENT)
Age: 74
Setting detail: SPECIMEN
Discharge: HOME | End: 2019-04-08
Payer: MEDICARE

## 2019-04-08 LAB
ALBUMIN SERPL-MCNC: 4 GM/DL (ref 3.4–5)
ALP BLD-CCNC: 93 IU/L (ref 40–129)
ALT SERPL-CCNC: 21 U/L (ref 10–40)
ANION GAP SERPL CALCULATED.3IONS-SCNC: 13 MMOL/L (ref 4–16)
AST SERPL-CCNC: 22 IU/L (ref 15–37)
BILIRUB SERPL-MCNC: 0.2 MG/DL (ref 0–1)
BUN BLDV-MCNC: 22 MG/DL (ref 6–23)
CALCIUM SERPL-MCNC: 9.9 MG/DL (ref 8.3–10.6)
CHLORIDE BLD-SCNC: 97 MMOL/L (ref 99–110)
CO2: 27 MMOL/L (ref 21–32)
CREAT SERPL-MCNC: 1.5 MG/DL (ref 0.6–1.1)
GFR AFRICAN AMERICAN: 41 ML/MIN/1.73M2
GFR NON-AFRICAN AMERICAN: 34 ML/MIN/1.73M2
GLUCOSE BLD-MCNC: 149 MG/DL (ref 70–99)
POTASSIUM SERPL-SCNC: 5.1 MMOL/L (ref 3.5–5.1)
SODIUM BLD-SCNC: 137 MMOL/L (ref 135–145)
TOTAL PROTEIN: 6.4 GM/DL (ref 6.4–8.2)

## 2019-04-08 PROCEDURE — 80053 COMPREHEN METABOLIC PANEL: CPT

## 2019-05-14 ENCOUNTER — HOSPITAL ENCOUNTER (OUTPATIENT)
Dept: CT IMAGING | Age: 74
Discharge: HOME | End: 2019-05-14
Payer: MEDICARE

## 2019-05-14 DIAGNOSIS — C18.7 ADENOCARCINOMA OF SIGMOID COLON (HCC): ICD-10-CM

## 2019-05-14 PROCEDURE — 6360000004 HC RX CONTRAST MEDICATION: Performed by: INTERNAL MEDICINE

## 2019-05-14 PROCEDURE — 71250 CT THORAX DX C-: CPT

## 2019-05-14 PROCEDURE — 74176 CT ABD & PELVIS W/O CONTRAST: CPT

## 2019-05-14 RX ADMIN — IOHEXOL 50 ML: 240 INJECTION, SOLUTION INTRATHECAL; INTRAVASCULAR; INTRAVENOUS; ORAL at 10:35

## 2019-05-16 ENCOUNTER — HOSPITAL ENCOUNTER (OUTPATIENT)
Age: 74
Setting detail: SPECIMEN
Discharge: HOME | End: 2019-05-16
Payer: MEDICARE

## 2019-05-16 LAB
ALBUMIN SERPL-MCNC: 4.1 GM/DL (ref 3.4–5)
ALP BLD-CCNC: 102 IU/L (ref 40–128)
ALT SERPL-CCNC: 13 U/L (ref 10–40)
ANION GAP SERPL CALCULATED.3IONS-SCNC: 13 MMOL/L (ref 4–16)
AST SERPL-CCNC: 15 IU/L (ref 15–37)
BILIRUB SERPL-MCNC: 0.3 MG/DL (ref 0–1)
BUN BLDV-MCNC: 30 MG/DL (ref 6–23)
CALCIUM SERPL-MCNC: 9.8 MG/DL (ref 8.3–10.6)
CEA: 2 NG/ML
CHLORIDE BLD-SCNC: 106 MMOL/L (ref 99–110)
CO2: 23 MMOL/L (ref 21–32)
CREAT SERPL-MCNC: 1.4 MG/DL (ref 0.6–1.1)
GFR AFRICAN AMERICAN: 44 ML/MIN/1.73M2
GFR NON-AFRICAN AMERICAN: 37 ML/MIN/1.73M2
GLUCOSE BLD-MCNC: 110 MG/DL (ref 70–99)
POTASSIUM SERPL-SCNC: 4.7 MMOL/L (ref 3.5–5.1)
SODIUM BLD-SCNC: 142 MMOL/L (ref 135–145)
TOTAL PROTEIN: 6.3 GM/DL (ref 6.4–8.2)

## 2019-05-16 PROCEDURE — 82378 CARCINOEMBRYONIC ANTIGEN: CPT

## 2019-05-16 PROCEDURE — 80053 COMPREHEN METABOLIC PANEL: CPT

## 2019-06-17 DIAGNOSIS — Z86.010 HX OF COLONIC POLYP: ICD-10-CM

## 2019-06-17 DIAGNOSIS — O22.30 DVT (DEEP VEIN THROMBOSIS) IN PREGNANCY: ICD-10-CM

## 2019-06-17 DIAGNOSIS — I10 ESSENTIAL HYPERTENSION: ICD-10-CM

## 2019-06-17 DIAGNOSIS — R73.01 IMPAIRED FASTING GLUCOSE: ICD-10-CM

## 2019-06-17 DIAGNOSIS — N18.30 CHRONIC KIDNEY DISEASE, STAGE 3 (HCC): ICD-10-CM

## 2019-06-17 RX ORDER — LISINOPRIL 10 MG/1
10 TABLET ORAL DAILY
COMMUNITY
End: 2019-06-24 | Stop reason: SDUPTHER

## 2019-06-17 RX ORDER — MAGNESIUM OXIDE 400 MG/1
400 TABLET ORAL 2 TIMES DAILY
COMMUNITY
End: 2020-08-10

## 2019-06-18 ENCOUNTER — OFFICE VISIT (OUTPATIENT)
Dept: SURGERY | Age: 74
End: 2019-06-18
Payer: MEDICARE

## 2019-06-18 VITALS
WEIGHT: 184 LBS | HEART RATE: 82 BPM | SYSTOLIC BLOOD PRESSURE: 128 MMHG | DIASTOLIC BLOOD PRESSURE: 80 MMHG | OXYGEN SATURATION: 98 % | BODY MASS INDEX: 35.94 KG/M2

## 2019-06-18 DIAGNOSIS — K92.1 BLOOD IN STOOL: Primary | ICD-10-CM

## 2019-06-18 PROCEDURE — 4040F PNEUMOC VAC/ADMIN/RCVD: CPT | Performed by: SURGERY

## 2019-06-18 PROCEDURE — 1036F TOBACCO NON-USER: CPT | Performed by: SURGERY

## 2019-06-18 PROCEDURE — 3017F COLORECTAL CA SCREEN DOC REV: CPT | Performed by: SURGERY

## 2019-06-18 PROCEDURE — 1090F PRES/ABSN URINE INCON ASSESS: CPT | Performed by: SURGERY

## 2019-06-18 PROCEDURE — G8417 CALC BMI ABV UP PARAM F/U: HCPCS | Performed by: SURGERY

## 2019-06-18 PROCEDURE — 99213 OFFICE O/P EST LOW 20 MIN: CPT | Performed by: SURGERY

## 2019-06-18 PROCEDURE — 1123F ACP DISCUSS/DSCN MKR DOCD: CPT | Performed by: SURGERY

## 2019-06-18 PROCEDURE — G8400 PT W/DXA NO RESULTS DOC: HCPCS | Performed by: SURGERY

## 2019-06-18 PROCEDURE — G8427 DOCREV CUR MEDS BY ELIG CLIN: HCPCS | Performed by: SURGERY

## 2019-06-18 RX ORDER — HYDROCORTISONE ACETATE 25 MG/1
25 SUPPOSITORY RECTAL EVERY 12 HOURS
Qty: 20 SUPPOSITORY | Refills: 0 | Status: SHIPPED | OUTPATIENT
Start: 2019-06-18 | End: 2019-06-28

## 2019-06-18 RX ORDER — ONDANSETRON HYDROCHLORIDE 8 MG/1
TABLET, FILM COATED ORAL
Refills: 3 | COMMUNITY
Start: 2019-03-16 | End: 2020-03-24 | Stop reason: SDUPTHER

## 2019-06-18 ASSESSMENT — ENCOUNTER SYMPTOMS
ABDOMINAL DISTENTION: 0
BLOOD IN STOOL: 1
ANAL BLEEDING: 1
COUGH: 0
ABDOMINAL PAIN: 0
CHOKING: 0

## 2019-06-18 NOTE — PROGRESS NOTES
Department of Vania Hawthorne MD   Consult Note      Reason for Consult:  Blood in stool    Referring Physician:  Dr. Mirian Bronson:    Chief Complaint   Patient presents with    Follow-up     blood in stool, had past ulcerated mass in sigmoid colon       History Obtained From:  patient    HISTORY OF PRESENTILLNESS:                The patient is a 75 y.o. female whopresents with bright red blood per rectum in the last day. It was on the outside of her stool. There was no pain with the BM. She has a history of hemorrhoids. She is on Eliquiss for a DVT in March. .    Past Medical History:    Past Medical History:   Diagnosis Date    Abnormal EKG      see ekg report-11/4/2016-\"have appt 11/18/2016 to see Dr Antonio Vidales to get heart clearance for surgery - 11/21/2016\"    Anemia     Chemotherapy induced diarrhea     Chronic kidney disease, stage 3 (HCC)     Colon Cancer Dx 1-8-19    Colonoscopy, tx surg- following with Dr Black to start chemo    Diabetes mellitus ([address] Dx 2000's    \"Borderline\"    DVT (deep vein thrombosis) in pregnancy ([address]     Essential hypertension     History of blood transfusion 1960's    No Reaction To Blood Transfusion Received    Hx of colonic polyp     Hyperlipidemia     Hypertension     Impaired fasting glucose     Malignant neoplasm of colon ([address]     PONV (postoperative nausea and vomiting)     denies hx of motion sickness    Shortness of breath on exertion     Teeth missing     Upper And Lower    UTI (urinary tract infection) In Past    No Current Symptoms    Wears dentures     Full Upper, Partial Lower    Wears glasses     Wears partial dentures     Lower     Past Surgical History:    Past Surgical History:   Procedure Laterality Date    APPENDECTOMY  1960    BACK SURGERY  2012    ACF C4, C5 With Hardware    BLADDER SUSPENSION  2002    Done With Vaginal Hysterectomy    CHOLECYSTECTOMY, LAPAROSCOPIC  11/21/2016    COLONOSCOPY  7-17-13    Polyps x2, Internal hemorrhoids    COLONOSCOPY  01/08/2019    Ulcerated mass in sigmoid at 15 cm, Internal grade 1 hemorrhoids, otherwise normal to 40cm    FRACTURE SURGERY Left 2016    Broken Left Wrist With Hardware    HYSTERECTOMY VAGINAL  2002    Bladder Suspension Also Done    INSERTION / REMOVAL / REPLACEMENT VENOUS ACCESS CATHETER Right 2/20/2019    PORT INSERTION performed by Shailesh Valenzuela MD at [address] 1/8/2019    [address] with tattoo performed by Suzanna Capellan MD at [address] N/A 1/16/2019    BOWEL RESECTION SIGMOID performed by Shailesh Valenzuela MD at [address] [place] [address]  Late 1970's    TUNNELED VENOUS PORT PLACEMENT  02/20/2019     Current Medications:   Current Outpatient Medications   Medication Sig Dispense Refill    lisinopril (PRINIVIL;ZESTRIL) 10 MG tablet Take 10 mg by mouth daily      magnesium oxide (MAG-OX) 400 MG tablet Take 400 mg by mouth 2 times daily      sertraline (ZOLOFT) 50 MG tablet Take 50 mg by mouth daily      apixaban (ELIQUIS STARTER PACK) 5 MG TABS tablet Take 10 mg (2 tablets) orally twice daily for 7 days, then take 5 mg (1 tablet) orally twice daily thereafter. 74 tablet 0    apixaban (ELIQUIS) 5 MG TABS tablet Take 1 tablet by mouth 2 times daily . Start after you finish the starter kit.  60 tablet 3    famotidine (PEPCID) 20 MG tablet Take 1 tablet by mouth 2 times daily 60 tablet 3    IRON PO Take by mouth 2 times daily Over The Counter      atorvastatin (LIPITOR) 40 MG tablet Take 40 mg by mouth nightly       ondansetron (ZOFRAN) 8 MG tablet TK 1 T PO Q 8 H PRF NAUSEA OR VOM  3    NONFORMULARY       dexamethasone (DECADRON) 1 MG tablet Take 1 mg by mouth 2 times daily (with meals) Indications: dosage unknown- pt sts to start on day 2-5 of each chemo cycle      zolpidem (AMBIEN) 5 MG tablet TK 1 T PO QD HS PRF SLP  0    triamterene-hydrochlorothiazide (MAXZIDE-25) 37.5-25 MG per tablet Take 1 tablet by mouth every morning        No current facility-administered medications for this visit.        Allergies:  Neosporin [neomycin-polymyx-gramicid]    Social History:   Social History     Socioeconomic History    Marital status:      Spouse name: Not on file    Number of children: Not on file    Years of education: Not on file    Highest education level: Not on file   Occupational History    Not on file   Social Needs    Financial resource strain: Not on file    Food insecurity:     Worry: Not on file     Inability: Not on file    Transportation needs:     Medical: Not on file     Non-medical: Not on file   Tobacco Use    Smoking status: Never Smoker    Smokeless tobacco: Never Used   Substance and Sexual Activity    Alcohol use: No    Drug use: No    Sexual activity: Never   Lifestyle    Physical activity:     Days per week: Not on file     Minutes per session: Not on file    Stress: Not on file   Relationships    Social connections:     Talks on phone: Not on file     Gets together: Not on file     Attends Sikh service: Not on file     Active member of club or organization: Not on file     Attends meetings of clubs or organizations: Not on file     Relationship status: Not on file    Intimate partner violence:     Fear of current or ex partner: Not on file     Emotionally abused: Not on file     Physically abused: Not on file     Forced sexual activity: Not on file   Other Topics Concern    Not on file   Social History Narrative    Not on file     Family History:   Family History   Problem Relation Age of Onset    Diabetes Mother     Cancer Mother         \"Brain Cancer\"    Tuberculosis Father     Diabetes Sister     COPD Sister     Cancer Brother         Lung Cancer    Early Death Brother 61        Lung Cancer    Early Death Daughter 47        Lung Cancer    Cancer Daughter         Lung Cancer    Cancer Daughter         Breast Cancer    Breast Cancer Daughter     Early Death Daughter 52        Breast Cancer        REVIEW OF SYSTEMS:    Review of Systems   Constitutional: Negative for activity change, appetite change, chills and fever. Respiratory: Negative for cough and choking. Cardiovascular: Negative for chest pain and leg swelling. Gastrointestinal: Positive for anal bleeding and blood in stool. Negative for abdominal distention and abdominal pain. Rectal exam shows 2 internal hemorrhoids and guaiac positive stool. Genitourinary: Negative for difficulty urinating and flank pain. Neurological: Negative for dizziness. Psychiatric/Behavioral: Negative for agitation and behavioral problems. PHYSICALEXAM:    /80 (Site: Left Upper Arm, Position: Sitting, Cuff Size: Large Adult)   Pulse 82   Wt 184 lb (83.5 kg)   SpO2 98%   BMI 35.94 kg/m²    Physical Exam  DATA:    CBC:   Lab Results   Component Value Date    WBC 5.6 03/13/2019    RBC 3.77 03/13/2019    HGB 9.8 03/13/2019    HCT 31.9 03/13/2019    MCV 84.6 03/13/2019    MCH 26.0 03/13/2019    MCHC 30.7 03/13/2019    RDW 17.1 03/13/2019     03/13/2019    MPV 10.5 03/13/2019     CMP:    Lab Results   Component Value Date     05/16/2019    K 4.7 05/16/2019     05/16/2019    CO2 23 05/16/2019    BUN 30 05/16/2019    CREATININE 1.4 05/16/2019    GFRAA 44 05/16/2019    LABGLOM 37 05/16/2019    GLUCOSE 110 05/16/2019    PROT 6.3 05/16/2019    LABALBU 4.1 05/16/2019    CALCIUM 9.8 05/16/2019    BILITOT 0.3 05/16/2019    ALKPHOS 102 05/16/2019    AST 15 05/16/2019    ALT 13 05/16/2019       IMPRESSION/RECOMMENDATIONS:  Internal hemorrhoids. Bleeding. Will try anusol HC and see her back in 2 weeks. Bianca Gresham.

## 2019-06-24 ENCOUNTER — OFFICE VISIT (OUTPATIENT)
Dept: FAMILY MEDICINE CLINIC | Age: 74
End: 2019-06-24
Payer: MEDICARE

## 2019-06-24 VITALS
HEIGHT: 62 IN | OXYGEN SATURATION: 95 % | WEIGHT: 185.4 LBS | SYSTOLIC BLOOD PRESSURE: 122 MMHG | DIASTOLIC BLOOD PRESSURE: 68 MMHG | HEART RATE: 75 BPM | BODY MASS INDEX: 34.12 KG/M2

## 2019-06-24 DIAGNOSIS — E78.2 MIXED HYPERLIPIDEMIA: ICD-10-CM

## 2019-06-24 DIAGNOSIS — R73.01 IMPAIRED FASTING GLUCOSE: ICD-10-CM

## 2019-06-24 DIAGNOSIS — Z12.31 ENCOUNTER FOR SCREENING MAMMOGRAM FOR BREAST CANCER: ICD-10-CM

## 2019-06-24 DIAGNOSIS — I10 ESSENTIAL HYPERTENSION: Primary | ICD-10-CM

## 2019-06-24 DIAGNOSIS — F32.4 MAJOR DEPRESSIVE DISORDER IN PARTIAL REMISSION, UNSPECIFIED WHETHER RECURRENT (HCC): ICD-10-CM

## 2019-06-24 DIAGNOSIS — I10 ESSENTIAL HYPERTENSION: ICD-10-CM

## 2019-06-24 DIAGNOSIS — Z23 NEED FOR PROPHYLACTIC VACCINATION AND INOCULATION AGAINST VARICELLA: ICD-10-CM

## 2019-06-24 DIAGNOSIS — K64.4 BLEEDING EXTERNAL HEMORRHOIDS: ICD-10-CM

## 2019-06-24 DIAGNOSIS — D50.0 IRON DEFICIENCY ANEMIA DUE TO CHRONIC BLOOD LOSS: ICD-10-CM

## 2019-06-24 DIAGNOSIS — N18.30 CHRONIC KIDNEY DISEASE, STAGE 3 (HCC): ICD-10-CM

## 2019-06-24 DIAGNOSIS — C18.9 MALIGNANT NEOPLASM OF COLON, UNSPECIFIED PART OF COLON (HCC): ICD-10-CM

## 2019-06-24 DIAGNOSIS — C18.7 CANCER OF SIGMOID COLON (HCC): ICD-10-CM

## 2019-06-24 DIAGNOSIS — Z78.0 POST-MENOPAUSAL: ICD-10-CM

## 2019-06-24 DIAGNOSIS — I82.503 CHRONIC VENOUS EMBOLISM AND THROMBOSIS OF DEEP VESSELS OF BOTH LOWER EXTREMITIES (HCC): Chronic | ICD-10-CM

## 2019-06-24 LAB
A/G RATIO: 1.7 (ref 1.1–2.2)
ALBUMIN SERPL-MCNC: 4.4 G/DL (ref 3.4–5)
ALP BLD-CCNC: 108 U/L (ref 40–129)
ALT SERPL-CCNC: 10 U/L (ref 10–40)
ANION GAP SERPL CALCULATED.3IONS-SCNC: 14 MMOL/L (ref 3–16)
AST SERPL-CCNC: 15 U/L (ref 15–37)
BASOPHILS ABSOLUTE: 0 K/UL (ref 0–0.2)
BASOPHILS RELATIVE PERCENT: 0.5 %
BILIRUB SERPL-MCNC: 0.4 MG/DL (ref 0–1)
BUN BLDV-MCNC: 35 MG/DL (ref 7–20)
CALCIUM SERPL-MCNC: 10.2 MG/DL (ref 8.3–10.6)
CHLORIDE BLD-SCNC: 104 MMOL/L (ref 99–110)
CHOLESTEROL, TOTAL: 161 MG/DL (ref 0–199)
CO2: 23 MMOL/L (ref 21–32)
CREAT SERPL-MCNC: 1.7 MG/DL (ref 0.6–1.2)
EOSINOPHILS ABSOLUTE: 0.2 K/UL (ref 0–0.6)
EOSINOPHILS RELATIVE PERCENT: 3.7 %
FERRITIN: 24.8 NG/ML (ref 15–150)
GFR AFRICAN AMERICAN: 35
GFR NON-AFRICAN AMERICAN: 29
GLOBULIN: 2.6 G/DL
GLUCOSE BLD-MCNC: 135 MG/DL (ref 70–99)
HCT VFR BLD CALC: 37.7 % (ref 36–48)
HDLC SERPL-MCNC: 49 MG/DL (ref 40–60)
HEMOGLOBIN: 12.3 G/DL (ref 12–16)
LDL CHOLESTEROL CALCULATED: 85 MG/DL
LYMPHOCYTES ABSOLUTE: 1 K/UL (ref 1–5.1)
LYMPHOCYTES RELATIVE PERCENT: 20.4 %
MCH RBC QN AUTO: 28.6 PG (ref 26–34)
MCHC RBC AUTO-ENTMCNC: 32.7 G/DL (ref 31–36)
MCV RBC AUTO: 87.6 FL (ref 80–100)
MONOCYTES ABSOLUTE: 0.4 K/UL (ref 0–1.3)
MONOCYTES RELATIVE PERCENT: 8.1 %
NEUTROPHILS ABSOLUTE: 3.4 K/UL (ref 1.7–7.7)
NEUTROPHILS RELATIVE PERCENT: 67.3 %
PDW BLD-RTO: 15.3 % (ref 12.4–15.4)
PLATELET # BLD: 177 K/UL (ref 135–450)
PMV BLD AUTO: 9 FL (ref 5–10.5)
POTASSIUM SERPL-SCNC: 4.7 MMOL/L (ref 3.5–5.1)
RBC # BLD: 4.31 M/UL (ref 4–5.2)
SODIUM BLD-SCNC: 141 MMOL/L (ref 136–145)
TOTAL PROTEIN: 7 G/DL (ref 6.4–8.2)
TRIGL SERPL-MCNC: 133 MG/DL (ref 0–150)
VLDLC SERPL CALC-MCNC: 27 MG/DL
WBC # BLD: 5 K/UL (ref 4–11)

## 2019-06-24 PROCEDURE — G8427 DOCREV CUR MEDS BY ELIG CLIN: HCPCS | Performed by: FAMILY MEDICINE

## 2019-06-24 PROCEDURE — 4040F PNEUMOC VAC/ADMIN/RCVD: CPT | Performed by: FAMILY MEDICINE

## 2019-06-24 PROCEDURE — 1036F TOBACCO NON-USER: CPT | Performed by: FAMILY MEDICINE

## 2019-06-24 PROCEDURE — 1090F PRES/ABSN URINE INCON ASSESS: CPT | Performed by: FAMILY MEDICINE

## 2019-06-24 PROCEDURE — G8417 CALC BMI ABV UP PARAM F/U: HCPCS | Performed by: FAMILY MEDICINE

## 2019-06-24 PROCEDURE — G8400 PT W/DXA NO RESULTS DOC: HCPCS | Performed by: FAMILY MEDICINE

## 2019-06-24 PROCEDURE — 3017F COLORECTAL CA SCREEN DOC REV: CPT | Performed by: FAMILY MEDICINE

## 2019-06-24 PROCEDURE — 1123F ACP DISCUSS/DSCN MKR DOCD: CPT | Performed by: FAMILY MEDICINE

## 2019-06-24 PROCEDURE — 99213 OFFICE O/P EST LOW 20 MIN: CPT | Performed by: FAMILY MEDICINE

## 2019-06-24 RX ORDER — LISINOPRIL 10 MG/1
10 TABLET ORAL DAILY
Qty: 90 TABLET | Refills: 1 | Status: SHIPPED | OUTPATIENT
Start: 2019-06-24 | End: 2019-12-02 | Stop reason: SDUPTHER

## 2019-06-24 RX ORDER — ATORVASTATIN CALCIUM 40 MG/1
40 TABLET, FILM COATED ORAL NIGHTLY
Qty: 90 TABLET | Refills: 1 | Status: SHIPPED | OUTPATIENT
Start: 2019-06-24 | End: 2019-12-02 | Stop reason: SDUPTHER

## 2019-06-24 RX ORDER — FAMOTIDINE 20 MG/1
20 TABLET, FILM COATED ORAL 2 TIMES DAILY
Qty: 180 TABLET | Refills: 1 | Status: SHIPPED | OUTPATIENT
Start: 2019-06-24 | End: 2019-12-02 | Stop reason: SDUPTHER

## 2019-06-24 RX ORDER — TRIAMTERENE AND HYDROCHLOROTHIAZIDE 37.5; 25 MG/1; MG/1
1 TABLET ORAL EVERY MORNING
Qty: 90 TABLET | Refills: 1 | Status: SHIPPED | OUTPATIENT
Start: 2019-06-24 | End: 2019-12-02 | Stop reason: SDUPTHER

## 2019-06-24 ASSESSMENT — ENCOUNTER SYMPTOMS
SHORTNESS OF BREATH: 0
WHEEZING: 0
COUGH: 0
RESPIRATORY NEGATIVE: 1
ABDOMINAL PAIN: 0
CHEST TIGHTNESS: 0

## 2019-06-24 ASSESSMENT — PATIENT HEALTH QUESTIONNAIRE - PHQ9
SUM OF ALL RESPONSES TO PHQ9 QUESTIONS 1 & 2: 0
SUM OF ALL RESPONSES TO PHQ QUESTIONS 1-9: 0
SUM OF ALL RESPONSES TO PHQ QUESTIONS 1-9: 0
1. LITTLE INTEREST OR PLEASURE IN DOING THINGS: 0
2. FEELING DOWN, DEPRESSED OR HOPELESS: 0

## 2019-06-24 NOTE — PROGRESS NOTES
6/24/2019     Chris Degroot is a 75 y.o. female who presents today with   Chief Complaint   Patient presents with    Hypertension    6 Month Follow-Up    Discuss Medications     STARTED ON ELIQUIS IN [address] [facility]. PER PT, SHOULD SHE CONTINUE IT OR NOT.  Medication Refill     TO WG/NL       HPI    Here for routine follow-up  She has a history of hypertension and hyperlipidemia  She had a colon cancer surgery complicated by DVT  She has not had 6 months of Eliquis therapy was asking about long-term which I think she should stay on is she may have ongoing issues with her malignancy  She saw her surgeon recently for rectal bleeding thought to be benign hemorrhoids like she is tolerating her medicine without any significant side effects  Has ongoing depression but it is fairly well controlled  She did have any suicidal thoughts  She wants to stay on    she has an appointment with her oncologist next month and will discuss the Eliquis therapy  She opts out of chemotherapy at this point      REVIEW OF SYMPTOMS    Review of Systems   Constitutional: Negative. Negative for activity change, appetite change and unexpected weight change. HENT: Negative. Respiratory: Negative. Negative for cough, chest tightness, shortness of breath and wheezing. Cardiovascular: Negative. Negative for chest pain and leg swelling. Gastrointestinal: Negative for abdominal pain. Genitourinary: Negative. Negative for difficulty urinating. Musculoskeletal: Negative. Negative for arthralgias and joint swelling. No swelling redness or pain to indicate acute DVT   Allergic/Immunologic: Negative for immunocompromised state. Neurological: Negative for dizziness. Hematological: Negative. Negative for adenopathy. Does not bruise/bleed easily. Psychiatric/Behavioral: Positive for dysphoric mood. Negative for self-injury and suicidal ideas. The patient is nervous/anxious.         PAST MEDICAL HISTORY  Past status: Never Smoker    Smokeless tobacco: Never Used   Substance and Sexual Activity    Alcohol use: No    Drug use: No    Sexual activity: Never   Lifestyle    Physical activity:     Days per week: None     Minutes per session: None    Stress: None   Relationships    Social connections:     Talks on phone: None     Gets together: None     Attends Mu-ism service: None     Active member of club or organization: None     Attends meetings of clubs or organizations: None     Relationship status: None    Intimate partner violence:     Fear of current or ex partner: None     Emotionally abused: None     Physically abused: None     Forced sexual activity: None   Other Topics Concern    None   Social History Narrative    None        SURGICAL HISTORY  Past Surgical History:   Procedure Laterality Date    APPENDECTOMY  1960    BACK SURGERY  2012    ACF C4, C5 With Hardware    BLADDER SUSPENSION  2002    Done With Vaginal Hysterectomy    CHOLECYSTECTOMY, LAPAROSCOPIC  11/21/2016    COLONOSCOPY  7-17-13    Polyps x2, Internal hemorrhoids    COLONOSCOPY  01/08/2019    Ulcerated mass in sigmoid at 15 cm, Internal grade 1 hemorrhoids, otherwise normal to 40cm    FRACTURE SURGERY Left 2016    Broken Left Wrist With Hardware    HYSTERECTOMY VAGINAL  2002    Bladder Suspension Also Done    INSERTION / REMOVAL / REPLACEMENT VENOUS ACCESS CATHETER Right 2/20/2019    PORT INSERTION performed by Michoacano Hernandez MD at [address] 1/8/2019    [facility] with tattoo performed by Tito Mcdowell MD at [facility] N/A 1/16/2019    BOWEL RESECTION SIGMOID performed by Michoacano Hernandez MD at [address] [facility]  Late 1970's    TUNNELED VENOUS PORT PLACEMENT  02/20/2019       CURRENT MEDICATIONS  Current Outpatient Medications   Medication Sig Dispense Refill    zoster recombinant adjuvanted vaccine ([address]) 50 MCG/0.5ML SUSR injection Inject 0.5 mLs into the muscle once for 1 dose 50 MCG IM then repeat 2-6 months. 1 each 1    Acetaminophen (TYLENOL EX ST ARTHRITIS PAIN PO) Take by mouth daily as needed TAKE PRN      triamterene-hydrochlorothiazide (MAXZIDE-25) 37.5-25 MG per tablet Take 1 tablet by mouth every morning 90 tablet 1    lisinopril (PRINIVIL;ZESTRIL) 10 MG tablet Take 1 tablet by mouth daily 90 tablet 1    sertraline (ZOLOFT) 50 MG tablet Take 1 tablet by mouth daily 90 tablet 1    famotidine (PEPCID) 20 MG tablet Take 1 tablet by mouth 2 times daily 180 tablet 1    atorvastatin (LIPITOR) 40 MG tablet Take 1 tablet by mouth nightly 90 tablet 1    hydrocortisone (ANUSOL-HC) 25 MG suppository Place 1 suppository rectally every 12 hours for 10 days 20 suppository 0    magnesium oxide (MAG-OX) 400 MG tablet Take 400 mg by mouth 2 times daily      apixaban (ELIQUIS) 5 MG TABS tablet Take 1 tablet by mouth 2 times daily . Start after you finish the starter kit. (Patient taking differently: Take 5 mg by mouth 2 times daily ) 60 tablet 3    ondansetron (ZOFRAN) 8 MG tablet TK 1 T PO Q 8 H PRF NAUSEA OR VOM  3    zolpidem (AMBIEN) 5 MG tablet TK 1 T PO QD HS PRF SLP  0    IRON PO Take by mouth 2 times daily Over The Counter       No current facility-administered medications for this visit. ALLERGIES  Allergies   Allergen Reactions    Neosporin [Neomycin-Polymyx-Gramicid] Swelling       PHYSICAL EXAM    /68 (Site: Right Upper Arm, Position: Sitting, Cuff Size: Medium Adult)   Pulse 75   Ht 5' 2\" (1.575 m)   Wt 185 lb 6.4 oz (84.1 kg)   SpO2 95% Comment: RA  BMI 33.91 kg/m²     Physical Exam   Constitutional: She is oriented to person, place, and time. She appears well-developed and well-nourished. HENT:   Right Ear: External ear normal.   Left Ear: External ear normal.   Nose: Nose normal.   Mouth/Throat: Oropharynx is clear and moist.   Eyes: Conjunctivae are normal.   Neck: Neck supple.  No thyromegaly present. Cardiovascular: Normal rate, regular rhythm, normal heart sounds and intact distal pulses. Pulmonary/Chest: Effort normal and breath sounds normal. No respiratory distress. She has no wheezes. She has no rales. Abdominal: Soft. She exhibits no distension. There is no tenderness. Musculoskeletal: Normal range of motion. She exhibits no edema, tenderness or deformity. Lymphadenopathy:     She has no cervical adenopathy. Neurological: She is alert and oriented to person, place, and time. Skin: Skin is warm and dry. No rash noted. No erythema. Psychiatric: She has a normal mood and affect. Nursing note and vitals reviewed. ASSESSMENT:    Lorena Spears was seen today for hypertension, 6 month follow-up, discuss medications and medication refill. Diagnoses and all orders for this visit:    Essential hypertension  -     Comprehensive Metabolic Panel; Future    Post-menopausal  -     DEXA Bone Density Axial Skeleton; Future    Encounter for screening mammogram for breast cancer  -     [facility] Digital Screen Bilateral [[id number]]; Future    Need for prophylactic vaccination and inoculation against varicella  -     zoster recombinant adjuvanted vaccine [facility]) 50 MCG/0.5ML SUSR injection; Inject 0.5 mLs into the muscle once for 1 dose 50 MCG IM then repeat 2-6 months. Cancer of sigmoid colon (HCC)    Impaired fasting glucose  -     Hemoglobin A1C; Future    Iron deficiency anemia due to chronic blood loss  -     CBC Auto Differential; Future  -     Ferritin; Future    Mixed hyperlipidemia  -     Lipid Panel;  Future    Malignant neoplasm of colon, unspecified part of colon ([address]    Chronic kidney disease, stage 3 (HCC)    Chronic venous embolism and thrombosis of deep vessels of both lower extremities (HCC)    Major depressive disorder in partial remission, unspecified whether recurrent ([address]    Bleeding external hemorrhoids    Other orders  -     triamterene-hydrochlorothiazide (MAXZIDE-25) 37.5-25 MG per tablet; Take 1 tablet by mouth every morning  -     lisinopril (PRINIVIL;ZESTRIL) 10 MG tablet; Take 1 tablet by mouth daily  -     sertraline (ZOLOFT) 50 MG tablet; Take 1 tablet by mouth daily  -     famotidine (PEPCID) 20 MG tablet; Take 1 tablet by mouth 2 times daily  -     atorvastatin (LIPITOR) 40 MG tablet; Take 1 tablet by mouth nightly            PLAN:    Continue follow-up with her surgeon and oncologist  Continue the Eliquis indefinitely on my recommendation  Renewed her current medication including her antihypertensive  Get labs to include lipids chemistry profile CBC to evaluate previous iron deficiency anemia and need for iron therapy which she has stopped  She needs an A1c as she has a history of impaired fasting glucose  Follow-up here in 6 months        Return in about 6 months (around 12/24/2019) for Medicare Annual Wellness Visit (AWV). Electronically signed by Sobai Restrepo MD on 6/24/2019    Please note that this chart was generated using dragon dictation software. Although every effort was made to ensure the accuracy of this automated transcription, some errors in transcription may have occurred.

## 2019-06-25 ENCOUNTER — TELEPHONE (OUTPATIENT)
Dept: FAMILY MEDICINE CLINIC | Age: 74
End: 2019-06-25

## 2019-06-25 LAB
ESTIMATED AVERAGE GLUCOSE: 134.1 MG/DL
HBA1C MFR BLD: 6.3 %

## 2019-06-25 NOTE — TELEPHONE ENCOUNTER
SPK W/ PT ADVISED NOTE BELOW. PT VOICED UNDERSTANDING.   Electronically signed by Yoel Raman MA on 6/25/2019 at 9:14 AM

## 2019-07-02 ENCOUNTER — OFFICE VISIT (OUTPATIENT)
Dept: SURGERY | Age: 74
End: 2019-07-02
Payer: MEDICARE

## 2019-07-02 VITALS — HEART RATE: 69 BPM | DIASTOLIC BLOOD PRESSURE: 68 MMHG | SYSTOLIC BLOOD PRESSURE: 130 MMHG | RESPIRATION RATE: 12 BRPM

## 2019-07-02 DIAGNOSIS — K64.9 HEMORRHOIDS, UNSPECIFIED HEMORRHOID TYPE: Primary | ICD-10-CM

## 2019-07-02 PROCEDURE — G8417 CALC BMI ABV UP PARAM F/U: HCPCS | Performed by: NURSE PRACTITIONER

## 2019-07-02 PROCEDURE — 99212 OFFICE O/P EST SF 10 MIN: CPT | Performed by: NURSE PRACTITIONER

## 2019-07-02 PROCEDURE — G8400 PT W/DXA NO RESULTS DOC: HCPCS | Performed by: NURSE PRACTITIONER

## 2019-07-02 PROCEDURE — 4040F PNEUMOC VAC/ADMIN/RCVD: CPT | Performed by: NURSE PRACTITIONER

## 2019-07-02 PROCEDURE — 1036F TOBACCO NON-USER: CPT | Performed by: NURSE PRACTITIONER

## 2019-07-02 PROCEDURE — 1090F PRES/ABSN URINE INCON ASSESS: CPT | Performed by: NURSE PRACTITIONER

## 2019-07-02 PROCEDURE — G8427 DOCREV CUR MEDS BY ELIG CLIN: HCPCS | Performed by: NURSE PRACTITIONER

## 2019-07-02 PROCEDURE — 1123F ACP DISCUSS/DSCN MKR DOCD: CPT | Performed by: NURSE PRACTITIONER

## 2019-07-02 PROCEDURE — 3017F COLORECTAL CA SCREEN DOC REV: CPT | Performed by: NURSE PRACTITIONER

## 2019-07-02 NOTE — PROGRESS NOTES
Objective:  /68   Pulse 69   Resp 12   Breastfeeding? No   BP Readings from Last 3 Encounters:   07/02/19 130/68   06/24/19 122/68   06/18/19 128/80     Wt Readings from Last 3 Encounters:   06/24/19 185 lb 6.4 oz (84.1 kg)   06/18/19 184 lb (83.5 kg)   03/12/19 185 lb 3.2 oz (84 kg)       Physical Exam   Constitutional: She appears well-developed and well-nourished. Eyes: Conjunctivae are normal. No scleral icterus. Cardiovascular: Normal rate, regular rhythm and normal heart sounds. No murmur heard. Pulmonary/Chest: Effort normal and breath sounds normal. No respiratory distress. She has no wheezes. Abdominal:   Rectal exam with 1 non thrombosed external hemorroid       Lab Results   Component Value Date    WBC 5.0 06/24/2019    HGB 12.3 06/24/2019    HCT 37.7 06/24/2019    MCV 87.6 06/24/2019     06/24/2019     Lab Results   Component Value Date     06/24/2019    K 4.7 06/24/2019     06/24/2019    CO2 23 06/24/2019    BUN 35 (H) 06/24/2019    CREATININE 1.7 (H) 06/24/2019    GLUCOSE 135 (H) 06/24/2019    CALCIUM 10.2 06/24/2019    PROT 7.0 06/24/2019    LABALBU 4.4 06/24/2019    BILITOT 0.4 06/24/2019    ALKPHOS 108 06/24/2019    AST 15 06/24/2019    ALT 10 06/24/2019    LABGLOM 29 (A) 06/24/2019    GFRAA 35 (A) 06/24/2019    AGRATIO 1.7 06/24/2019    GLOB 2.6 06/24/2019     Lab Results   Component Value Date    CHOL 161 06/24/2019    CHOL 151 12/31/2018     Lab Results   Component Value Date    TRIG 133 06/24/2019    TRIG 151 12/31/2018     Lab Results   Component Value Date    HDL 49 06/24/2019    HDL 45 12/31/2018     Lab Results   Component Value Date    LDLCALC 85 06/24/2019    [address] 12/31/2018     Lab Results   Component Value Date    LABA1C 6.3 06/24/2019     Lab Results   Component Value Date    TSHHS 3.950 03/18/2019       ASSESSMENT:      1. Hemorrhoids, unspecified hemorrhoid type      PLAN:  1.  Resolved - may use anusol HC PRN    Care discussed with

## 2019-08-08 ENCOUNTER — HOSPITAL ENCOUNTER (OUTPATIENT)
Dept: CT IMAGING | Age: 74
Discharge: HOME | End: 2019-08-08
Payer: MEDICARE

## 2019-08-08 DIAGNOSIS — C18.9 MALIGNANT NEOPLASM OF COLON, UNSPECIFIED PART OF COLON (HCC): ICD-10-CM

## 2019-08-08 PROCEDURE — 74176 CT ABD & PELVIS W/O CONTRAST: CPT

## 2019-08-08 PROCEDURE — 6360000004 HC RX CONTRAST MEDICATION: Performed by: INTERNAL MEDICINE

## 2019-08-08 PROCEDURE — 71250 CT THORAX DX C-: CPT

## 2019-08-08 RX ADMIN — IOHEXOL 50 ML: 240 INJECTION, SOLUTION INTRATHECAL; INTRAVASCULAR; INTRAVENOUS; ORAL at 09:58

## 2019-08-15 ENCOUNTER — HOSPITAL ENCOUNTER (OUTPATIENT)
Age: 74
Setting detail: SPECIMEN
Discharge: HOME | End: 2019-08-15
Payer: MEDICARE

## 2019-08-15 LAB
ALBUMIN SERPL-MCNC: 4 GM/DL (ref 3.4–5)
ALP BLD-CCNC: 109 IU/L (ref 40–129)
ALT SERPL-CCNC: 15 U/L (ref 10–40)
ANION GAP SERPL CALCULATED.3IONS-SCNC: 9 MMOL/L (ref 4–16)
AST SERPL-CCNC: 17 IU/L (ref 15–37)
BILIRUB SERPL-MCNC: 0.3 MG/DL (ref 0–1)
BUN BLDV-MCNC: 32 MG/DL (ref 6–23)
CALCIUM SERPL-MCNC: 10.3 MG/DL (ref 8.3–10.6)
CEA: 2.2 NG/ML
CHLORIDE BLD-SCNC: 102 MMOL/L (ref 99–110)
CO2: 24 MMOL/L (ref 21–32)
CREAT SERPL-MCNC: 1.5 MG/DL (ref 0.6–1.1)
GFR AFRICAN AMERICAN: 41 ML/MIN/1.73M2
GFR NON-AFRICAN AMERICAN: 34 ML/MIN/1.73M2
GLUCOSE FASTING: 147 MG/DL (ref 70–99)
POTASSIUM SERPL-SCNC: 5 MMOL/L (ref 3.5–5.1)
SODIUM BLD-SCNC: 135 MMOL/L (ref 135–145)
TOTAL PROTEIN: 6.7 GM/DL (ref 6.4–8.2)

## 2019-08-15 PROCEDURE — 80053 COMPREHEN METABOLIC PANEL: CPT

## 2019-08-15 PROCEDURE — 82378 CARCINOEMBRYONIC ANTIGEN: CPT

## 2019-10-14 ENCOUNTER — ANESTHESIA EVENT (OUTPATIENT)
Dept: OPERATING ROOM | Age: 74
End: 2019-10-14
Payer: MEDICARE

## 2019-10-17 ENCOUNTER — ANESTHESIA (OUTPATIENT)
Dept: OPERATING ROOM | Age: 74
End: 2019-10-17
Payer: MEDICARE

## 2019-10-17 ENCOUNTER — HOSPITAL ENCOUNTER (OUTPATIENT)
Age: 74
Setting detail: OUTPATIENT SURGERY
Discharge: HOME | End: 2019-10-17
Attending: SPECIALIST | Admitting: SPECIALIST
Payer: MEDICARE

## 2019-10-17 VITALS
BODY MASS INDEX: 35.3 KG/M2 | WEIGHT: 187 LBS | OXYGEN SATURATION: 100 % | RESPIRATION RATE: 16 BRPM | TEMPERATURE: 97 F | DIASTOLIC BLOOD PRESSURE: 72 MMHG | HEART RATE: 68 BPM | SYSTOLIC BLOOD PRESSURE: 133 MMHG | HEIGHT: 61 IN

## 2019-10-17 VITALS
OXYGEN SATURATION: 91 % | RESPIRATION RATE: 13 BRPM | SYSTOLIC BLOOD PRESSURE: 92 MMHG | TEMPERATURE: 98.6 F | DIASTOLIC BLOOD PRESSURE: 42 MMHG

## 2019-10-17 PROCEDURE — 2500000003 HC RX 250 WO HCPCS: Performed by: SPECIALIST

## 2019-10-17 PROCEDURE — 2500000003 HC RX 250 WO HCPCS: Performed by: NURSE ANESTHETIST, CERTIFIED REGISTERED

## 2019-10-17 PROCEDURE — 3700000001 HC ADD 15 MINUTES (ANESTHESIA): Performed by: SPECIALIST

## 2019-10-17 PROCEDURE — 3700000000 HC ANESTHESIA ATTENDED CARE: Performed by: SPECIALIST

## 2019-10-17 PROCEDURE — 6370000000 HC RX 637 (ALT 250 FOR IP): Performed by: SPECIALIST

## 2019-10-17 PROCEDURE — 7100000011 HC PHASE II RECOVERY - ADDTL 15 MIN: Performed by: SPECIALIST

## 2019-10-17 PROCEDURE — 3609027000 HC COLONOSCOPY: Performed by: SPECIALIST

## 2019-10-17 PROCEDURE — 6360000002 HC RX W HCPCS: Performed by: NURSE ANESTHETIST, CERTIFIED REGISTERED

## 2019-10-17 PROCEDURE — 2709999900 HC NON-CHARGEABLE SUPPLY: Performed by: SPECIALIST

## 2019-10-17 PROCEDURE — 7100000010 HC PHASE II RECOVERY - FIRST 15 MIN: Performed by: SPECIALIST

## 2019-10-17 PROCEDURE — 2580000003 HC RX 258: Performed by: SPECIALIST

## 2019-10-17 RX ORDER — LIDOCAINE HYDROCHLORIDE 20 MG/ML
INJECTION, SOLUTION EPIDURAL; INFILTRATION; INTRACAUDAL; PERINEURAL PRN
Status: DISCONTINUED | OUTPATIENT
Start: 2019-10-17 | End: 2019-10-17 | Stop reason: SDUPTHER

## 2019-10-17 RX ORDER — SODIUM CHLORIDE, SODIUM LACTATE, POTASSIUM CHLORIDE, CALCIUM CHLORIDE 600; 310; 30; 20 MG/100ML; MG/100ML; MG/100ML; MG/100ML
INJECTION, SOLUTION INTRAVENOUS CONTINUOUS
Status: DISCONTINUED | OUTPATIENT
Start: 2019-10-17 | End: 2019-10-17 | Stop reason: HOSPADM

## 2019-10-17 RX ORDER — PROPOFOL 10 MG/ML
INJECTION, EMULSION INTRAVENOUS PRN
Status: DISCONTINUED | OUTPATIENT
Start: 2019-10-17 | End: 2019-10-17 | Stop reason: SDUPTHER

## 2019-10-17 RX ADMIN — PROPOFOL 10 MG: 10 INJECTION, EMULSION INTRAVENOUS at 09:58

## 2019-10-17 RX ADMIN — PROPOFOL 120 MG: 10 INJECTION, EMULSION INTRAVENOUS at 09:59

## 2019-10-17 RX ADMIN — LIDOCAINE HYDROCHLORIDE 100 MG: 20 INJECTION, SOLUTION EPIDURAL; INFILTRATION; INTRACAUDAL; PERINEURAL at 09:57

## 2019-10-17 RX ADMIN — SODIUM CHLORIDE, POTASSIUM CHLORIDE, SODIUM LACTATE AND CALCIUM CHLORIDE: 600; 310; 30; 20 INJECTION, SOLUTION INTRAVENOUS at 09:26

## 2019-10-17 RX ADMIN — PROPOFOL 70 MG: 10 INJECTION, EMULSION INTRAVENOUS at 09:57

## 2019-10-17 ASSESSMENT — PAIN SCALES - GENERAL
PAINLEVEL_OUTOF10: 0
PAINLEVEL_OUTOF10: 0

## 2019-10-17 ASSESSMENT — ENCOUNTER SYMPTOMS: DYSPNEA ACTIVITY LEVEL: AFTER AMBULATING 2 FLIGHTS OF STAIRS

## 2019-10-17 ASSESSMENT — PAIN - FUNCTIONAL ASSESSMENT: PAIN_FUNCTIONAL_ASSESSMENT: 0-10

## 2019-10-17 ASSESSMENT — LIFESTYLE VARIABLES: SMOKING_STATUS: 0

## 2019-12-02 ENCOUNTER — OFFICE VISIT (OUTPATIENT)
Dept: FAMILY MEDICINE CLINIC | Age: 74
End: 2019-12-02
Payer: MEDICARE

## 2019-12-02 VITALS
DIASTOLIC BLOOD PRESSURE: 78 MMHG | OXYGEN SATURATION: 97 % | WEIGHT: 194.6 LBS | BODY MASS INDEX: 36.74 KG/M2 | HEIGHT: 61 IN | SYSTOLIC BLOOD PRESSURE: 122 MMHG | HEART RATE: 69 BPM

## 2019-12-02 DIAGNOSIS — R73.01 IMPAIRED FASTING GLUCOSE: ICD-10-CM

## 2019-12-02 DIAGNOSIS — I10 ESSENTIAL HYPERTENSION: ICD-10-CM

## 2019-12-02 DIAGNOSIS — R73.01 IMPAIRED FASTING GLUCOSE: Primary | ICD-10-CM

## 2019-12-02 DIAGNOSIS — C18.7 CANCER OF SIGMOID COLON (HCC): ICD-10-CM

## 2019-12-02 DIAGNOSIS — N18.30 CHRONIC KIDNEY DISEASE, STAGE 3 (HCC): ICD-10-CM

## 2019-12-02 LAB
A/G RATIO: 1.6 (ref 1.1–2.2)
ALBUMIN SERPL-MCNC: 4.3 G/DL (ref 3.4–5)
ALP BLD-CCNC: 121 U/L (ref 40–129)
ALT SERPL-CCNC: 16 U/L (ref 10–40)
ANION GAP SERPL CALCULATED.3IONS-SCNC: 13 MMOL/L (ref 3–16)
AST SERPL-CCNC: 19 U/L (ref 15–37)
BILIRUB SERPL-MCNC: 0.3 MG/DL (ref 0–1)
BUN BLDV-MCNC: 33 MG/DL (ref 7–20)
CALCIUM SERPL-MCNC: 10.3 MG/DL (ref 8.3–10.6)
CHLORIDE BLD-SCNC: 106 MMOL/L (ref 99–110)
CHOLESTEROL, TOTAL: 168 MG/DL (ref 0–199)
CO2: 23 MMOL/L (ref 21–32)
CREAT SERPL-MCNC: 1.5 MG/DL (ref 0.6–1.2)
GFR AFRICAN AMERICAN: 41
GFR NON-AFRICAN AMERICAN: 34
GLOBULIN: 2.7 G/DL
GLUCOSE BLD-MCNC: 127 MG/DL (ref 70–99)
HDLC SERPL-MCNC: 47 MG/DL (ref 40–60)
LDL CHOLESTEROL CALCULATED: 88 MG/DL
POTASSIUM SERPL-SCNC: 5 MMOL/L (ref 3.5–5.1)
SODIUM BLD-SCNC: 142 MMOL/L (ref 136–145)
TOTAL PROTEIN: 7 G/DL (ref 6.4–8.2)
TRIGL SERPL-MCNC: 167 MG/DL (ref 0–150)
VLDLC SERPL CALC-MCNC: 33 MG/DL

## 2019-12-02 PROCEDURE — G8417 CALC BMI ABV UP PARAM F/U: HCPCS | Performed by: FAMILY MEDICINE

## 2019-12-02 PROCEDURE — 1036F TOBACCO NON-USER: CPT | Performed by: FAMILY MEDICINE

## 2019-12-02 PROCEDURE — G8482 FLU IMMUNIZE ORDER/ADMIN: HCPCS | Performed by: FAMILY MEDICINE

## 2019-12-02 PROCEDURE — G8400 PT W/DXA NO RESULTS DOC: HCPCS | Performed by: FAMILY MEDICINE

## 2019-12-02 PROCEDURE — 3017F COLORECTAL CA SCREEN DOC REV: CPT | Performed by: FAMILY MEDICINE

## 2019-12-02 PROCEDURE — 1090F PRES/ABSN URINE INCON ASSESS: CPT | Performed by: FAMILY MEDICINE

## 2019-12-02 PROCEDURE — 1123F ACP DISCUSS/DSCN MKR DOCD: CPT | Performed by: FAMILY MEDICINE

## 2019-12-02 PROCEDURE — G8427 DOCREV CUR MEDS BY ELIG CLIN: HCPCS | Performed by: FAMILY MEDICINE

## 2019-12-02 PROCEDURE — 4040F PNEUMOC VAC/ADMIN/RCVD: CPT | Performed by: FAMILY MEDICINE

## 2019-12-02 PROCEDURE — 99213 OFFICE O/P EST LOW 20 MIN: CPT | Performed by: FAMILY MEDICINE

## 2019-12-02 RX ORDER — FAMOTIDINE 20 MG/1
20 TABLET, FILM COATED ORAL 2 TIMES DAILY
Qty: 180 TABLET | Refills: 1 | Status: SHIPPED | OUTPATIENT
Start: 2019-12-02 | End: 2020-01-14

## 2019-12-02 RX ORDER — ATORVASTATIN CALCIUM 40 MG/1
40 TABLET, FILM COATED ORAL NIGHTLY
Qty: 90 TABLET | Refills: 1 | Status: SHIPPED | OUTPATIENT
Start: 2019-12-02 | End: 2020-06-02 | Stop reason: SDUPTHER

## 2019-12-02 RX ORDER — TRIAMTERENE AND HYDROCHLOROTHIAZIDE 37.5; 25 MG/1; MG/1
1 TABLET ORAL EVERY MORNING
Qty: 90 TABLET | Refills: 1 | Status: SHIPPED | OUTPATIENT
Start: 2019-12-02 | End: 2020-01-14

## 2019-12-02 RX ORDER — LISINOPRIL 10 MG/1
10 TABLET ORAL EVERY MORNING
Qty: 90 TABLET | Refills: 1 | Status: SHIPPED | OUTPATIENT
Start: 2019-12-02 | End: 2020-01-14

## 2019-12-02 ASSESSMENT — ENCOUNTER SYMPTOMS
CHEST TIGHTNESS: 0
RESPIRATORY NEGATIVE: 1
SHORTNESS OF BREATH: 0
ABDOMINAL PAIN: 0
COUGH: 0
WHEEZING: 0

## 2019-12-03 LAB
ESTIMATED AVERAGE GLUCOSE: 122.6 MG/DL
HBA1C MFR BLD: 5.9 %

## 2019-12-03 PROCEDURE — 90653 IIV ADJUVANT VACCINE IM: CPT | Performed by: FAMILY MEDICINE

## 2019-12-03 PROCEDURE — G0008 ADMIN INFLUENZA VIRUS VAC: HCPCS | Performed by: FAMILY MEDICINE

## 2019-12-13 ENCOUNTER — HOSPITAL ENCOUNTER (OUTPATIENT)
Dept: CT IMAGING | Age: 74
Discharge: HOME | End: 2019-12-13
Payer: MEDICARE

## 2019-12-13 ENCOUNTER — HOSPITAL ENCOUNTER (OUTPATIENT)
Age: 74
Discharge: HOME | End: 2019-12-13
Payer: MEDICARE

## 2019-12-13 DIAGNOSIS — C18.7 ADENOCARCINOMA OF SIGMOID COLON (HCC): ICD-10-CM

## 2019-12-13 LAB
ALBUMIN SERPL-MCNC: 4.3 GM/DL (ref 3.4–5)
ALP BLD-CCNC: 124 IU/L (ref 40–128)
ALT SERPL-CCNC: 18 U/L (ref 10–40)
ANION GAP SERPL CALCULATED.3IONS-SCNC: 13 MMOL/L (ref 4–16)
AST SERPL-CCNC: 18 IU/L (ref 15–37)
BILIRUB SERPL-MCNC: 0.3 MG/DL (ref 0–1)
BUN BLDV-MCNC: 37 MG/DL (ref 6–23)
CALCIUM SERPL-MCNC: 10.3 MG/DL (ref 8.3–10.6)
CEA: 2.2 NG/ML
CHLORIDE BLD-SCNC: 101 MMOL/L (ref 99–110)
CO2: 26 MMOL/L (ref 21–32)
CREAT SERPL-MCNC: 1.7 MG/DL (ref 0.6–1.1)
DIFFERENTIAL TYPE: ABNORMAL
EOSINOPHILS ABSOLUTE: 0.2 K/CU MM
EOSINOPHILS RELATIVE PERCENT: 3 % (ref 0–3)
GFR AFRICAN AMERICAN: 35 ML/MIN/1.73M2
GFR NON-AFRICAN AMERICAN: 29 ML/MIN/1.73M2
GLUCOSE BLD-MCNC: 127 MG/DL (ref 70–99)
HCT VFR BLD CALC: 40.5 % (ref 37–47)
HEMOGLOBIN: 12.8 GM/DL (ref 12.5–16)
LYMPHOCYTES ABSOLUTE: 1.6 K/CU MM
LYMPHOCYTES RELATIVE PERCENT: 28 % (ref 24–44)
MCH RBC QN AUTO: 29.2 PG (ref 27–31)
MCHC RBC AUTO-ENTMCNC: 31.6 % (ref 32–36)
MCV RBC AUTO: 92.5 FL (ref 78–100)
MONOCYTES ABSOLUTE: 0.3 K/CU MM
MONOCYTES RELATIVE PERCENT: 6 % (ref 0–4)
PDW BLD-RTO: 12.6 % (ref 11.7–14.9)
PLATELET # BLD: 197 K/CU MM (ref 140–440)
PLT MORPHOLOGY: ABNORMAL
PMV BLD AUTO: 10.5 FL (ref 7.5–11.1)
POLYCHROMASIA: ABNORMAL
POTASSIUM SERPL-SCNC: 5 MMOL/L (ref 3.5–5.1)
RBC # BLD: 4.38 M/CU MM (ref 4.2–5.4)
SEGMENTED NEUTROPHILS ABSOLUTE COUNT: 3.5 K/CU MM
SEGMENTED NEUTROPHILS RELATIVE PERCENT: 63 % (ref 36–66)
SODIUM BLD-SCNC: 140 MMOL/L (ref 135–145)
TOTAL PROTEIN: 7.2 GM/DL (ref 6.4–8.2)
WBC # BLD: 5.6 K/CU MM (ref 4–10.5)
WBC # BLD: ABNORMAL 10*3/UL

## 2019-12-13 PROCEDURE — 6360000004 HC RX CONTRAST MEDICATION: Performed by: INTERNAL MEDICINE

## 2019-12-13 PROCEDURE — 82378 CARCINOEMBRYONIC ANTIGEN: CPT

## 2019-12-13 PROCEDURE — 85027 COMPLETE CBC AUTOMATED: CPT

## 2019-12-13 PROCEDURE — 71250 CT THORAX DX C-: CPT

## 2019-12-13 PROCEDURE — 74176 CT ABD & PELVIS W/O CONTRAST: CPT

## 2019-12-13 PROCEDURE — 80053 COMPREHEN METABOLIC PANEL: CPT

## 2019-12-13 PROCEDURE — 85007 BL SMEAR W/DIFF WBC COUNT: CPT

## 2019-12-13 PROCEDURE — 36415 COLL VENOUS BLD VENIPUNCTURE: CPT

## 2019-12-13 RX ADMIN — IOHEXOL 50 ML: 240 INJECTION, SOLUTION INTRATHECAL; INTRAVASCULAR; INTRAVENOUS; ORAL at 13:18

## 2019-12-23 ENCOUNTER — HOSPITAL ENCOUNTER (OUTPATIENT)
Dept: ULTRASOUND IMAGING | Age: 74
Discharge: HOME | End: 2019-12-23
Payer: MEDICARE

## 2019-12-23 DIAGNOSIS — E04.1 THYROID NODULE: ICD-10-CM

## 2019-12-23 PROCEDURE — 76536 US EXAM OF HEAD AND NECK: CPT

## 2020-01-14 RX ORDER — TRIAMTERENE AND HYDROCHLOROTHIAZIDE 37.5; 25 MG/1; MG/1
1 TABLET ORAL EVERY MORNING
Qty: 90 TABLET | Refills: 1 | Status: SHIPPED | OUTPATIENT
Start: 2020-01-14 | End: 2020-06-02 | Stop reason: SDUPTHER

## 2020-01-14 RX ORDER — LISINOPRIL 10 MG/1
TABLET ORAL
Qty: 90 TABLET | Refills: 1 | Status: SHIPPED | OUTPATIENT
Start: 2020-01-14 | End: 2020-06-02 | Stop reason: SDUPTHER

## 2020-01-14 RX ORDER — FAMOTIDINE 20 MG/1
TABLET, FILM COATED ORAL
Qty: 180 TABLET | Refills: 1 | Status: SHIPPED | OUTPATIENT
Start: 2020-01-14 | End: 2020-03-24 | Stop reason: SDUPTHER

## 2020-03-24 RX ORDER — FAMOTIDINE 20 MG/1
TABLET, FILM COATED ORAL
Qty: 180 TABLET | Refills: 1 | Status: SHIPPED | OUTPATIENT
Start: 2020-03-24 | End: 2020-06-02 | Stop reason: SDUPTHER

## 2020-03-24 RX ORDER — ONDANSETRON HYDROCHLORIDE 8 MG/1
TABLET, FILM COATED ORAL
Qty: 20 TABLET | Refills: 3 | Status: SHIPPED | OUTPATIENT
Start: 2020-03-24 | End: 2020-06-02 | Stop reason: SDUPTHER

## 2020-06-01 ENCOUNTER — HOSPITAL ENCOUNTER (OUTPATIENT)
Dept: CT IMAGING | Age: 75
Discharge: HOME | End: 2020-06-01
Payer: MEDICARE

## 2020-06-01 PROCEDURE — 74177 CT ABD & PELVIS W/CONTRAST: CPT

## 2020-06-01 PROCEDURE — 6360000004 HC RX CONTRAST MEDICATION: Performed by: INTERNAL MEDICINE

## 2020-06-01 PROCEDURE — 74176 CT ABD & PELVIS W/O CONTRAST: CPT

## 2020-06-01 PROCEDURE — 71260 CT THORAX DX C+: CPT

## 2020-06-01 PROCEDURE — 71250 CT THORAX DX C-: CPT

## 2020-06-01 RX ORDER — SODIUM CHLORIDE 0.9 % (FLUSH) 0.9 %
10 SYRINGE (ML) INJECTION PRN
Status: DISCONTINUED | OUTPATIENT
Start: 2020-06-01 | End: 2020-06-02 | Stop reason: HOSPADM

## 2020-06-01 RX ADMIN — IOHEXOL 50 ML: 240 INJECTION, SOLUTION INTRATHECAL; INTRAVASCULAR; INTRAVENOUS; ORAL at 08:45

## 2020-06-02 ENCOUNTER — VIRTUAL VISIT (OUTPATIENT)
Dept: FAMILY MEDICINE CLINIC | Age: 75
End: 2020-06-02
Payer: MEDICARE

## 2020-06-02 VITALS — TEMPERATURE: 97 F | WEIGHT: 187 LBS | BODY MASS INDEX: 35.3 KG/M2 | HEIGHT: 61 IN

## 2020-06-02 PROBLEM — E61.1 IRON DEFICIENCY: Chronic | Status: ACTIVE | Noted: 2020-06-02

## 2020-06-02 PROBLEM — F32.5 MAJOR DEPRESSIVE DISORDER WITH SINGLE EPISODE, IN FULL REMISSION (HCC): Chronic | Status: ACTIVE | Noted: 2020-06-02

## 2020-06-02 PROCEDURE — G8510 SCR DEP NEG, NO PLAN REQD: HCPCS | Performed by: FAMILY MEDICINE

## 2020-06-02 PROCEDURE — G8427 DOCREV CUR MEDS BY ELIG CLIN: HCPCS | Performed by: FAMILY MEDICINE

## 2020-06-02 PROCEDURE — 4040F PNEUMOC VAC/ADMIN/RCVD: CPT | Performed by: FAMILY MEDICINE

## 2020-06-02 PROCEDURE — 1036F TOBACCO NON-USER: CPT | Performed by: FAMILY MEDICINE

## 2020-06-02 PROCEDURE — G8400 PT W/DXA NO RESULTS DOC: HCPCS | Performed by: FAMILY MEDICINE

## 2020-06-02 PROCEDURE — G8417 CALC BMI ABV UP PARAM F/U: HCPCS | Performed by: FAMILY MEDICINE

## 2020-06-02 PROCEDURE — 1123F ACP DISCUSS/DSCN MKR DOCD: CPT | Performed by: FAMILY MEDICINE

## 2020-06-02 PROCEDURE — 99213 OFFICE O/P EST LOW 20 MIN: CPT | Performed by: FAMILY MEDICINE

## 2020-06-02 PROCEDURE — 3017F COLORECTAL CA SCREEN DOC REV: CPT | Performed by: FAMILY MEDICINE

## 2020-06-02 PROCEDURE — 1090F PRES/ABSN URINE INCON ASSESS: CPT | Performed by: FAMILY MEDICINE

## 2020-06-02 RX ORDER — TRIAMTERENE AND HYDROCHLOROTHIAZIDE 37.5; 25 MG/1; MG/1
1 TABLET ORAL EVERY MORNING
Qty: 90 TABLET | Refills: 1 | Status: ON HOLD | OUTPATIENT
Start: 2020-06-02 | End: 2020-08-12 | Stop reason: HOSPADM

## 2020-06-02 RX ORDER — ATORVASTATIN CALCIUM 40 MG/1
40 TABLET, FILM COATED ORAL NIGHTLY
Qty: 90 TABLET | Refills: 1 | Status: SHIPPED | OUTPATIENT
Start: 2020-06-02 | End: 2021-01-08 | Stop reason: SDUPTHER

## 2020-06-02 RX ORDER — FAMOTIDINE 20 MG/1
TABLET, FILM COATED ORAL
Qty: 180 TABLET | Refills: 1 | Status: SHIPPED | OUTPATIENT
Start: 2020-06-02 | End: 2020-09-03 | Stop reason: SDUPTHER

## 2020-06-02 RX ORDER — ONDANSETRON HYDROCHLORIDE 8 MG/1
TABLET, FILM COATED ORAL
Qty: 20 TABLET | Refills: 3 | Status: ON HOLD | OUTPATIENT
Start: 2020-06-02 | End: 2020-08-12 | Stop reason: SDUPTHER

## 2020-06-02 RX ORDER — LISINOPRIL 10 MG/1
TABLET ORAL
Qty: 90 TABLET | Refills: 1 | Status: ON HOLD | OUTPATIENT
Start: 2020-06-02 | End: 2020-08-12 | Stop reason: HOSPADM

## 2020-06-02 ASSESSMENT — PATIENT HEALTH QUESTIONNAIRE - PHQ9
2. FEELING DOWN, DEPRESSED OR HOPELESS: 0
SUM OF ALL RESPONSES TO PHQ QUESTIONS 1-9: 0
SUM OF ALL RESPONSES TO PHQ9 QUESTIONS 1 & 2: 0
SUM OF ALL RESPONSES TO PHQ QUESTIONS 1-9: 0
1. LITTLE INTEREST OR PLEASURE IN DOING THINGS: 0

## 2020-06-03 ENCOUNTER — VIRTUAL VISIT (OUTPATIENT)
Dept: FAMILY MEDICINE CLINIC | Age: 75
End: 2020-06-03
Payer: MEDICARE

## 2020-06-03 VITALS — WEIGHT: 186.95 LBS | HEIGHT: 61 IN | BODY MASS INDEX: 35.3 KG/M2 | TEMPERATURE: 97 F

## 2020-06-03 PROCEDURE — 4040F PNEUMOC VAC/ADMIN/RCVD: CPT | Performed by: FAMILY MEDICINE

## 2020-06-03 PROCEDURE — 1123F ACP DISCUSS/DSCN MKR DOCD: CPT | Performed by: FAMILY MEDICINE

## 2020-06-03 PROCEDURE — 3017F COLORECTAL CA SCREEN DOC REV: CPT | Performed by: FAMILY MEDICINE

## 2020-06-03 PROCEDURE — G0438 PPPS, INITIAL VISIT: HCPCS | Performed by: FAMILY MEDICINE

## 2020-06-03 ASSESSMENT — PATIENT HEALTH QUESTIONNAIRE - PHQ9
SUM OF ALL RESPONSES TO PHQ QUESTIONS 1-9: 0
SUM OF ALL RESPONSES TO PHQ QUESTIONS 1-9: 0

## 2020-06-03 ASSESSMENT — LIFESTYLE VARIABLES: HOW OFTEN DO YOU HAVE A DRINK CONTAINING ALCOHOL: 0

## 2020-06-03 NOTE — PROGRESS NOTES
Medicare Annual Wellness Visit  Name: Vernal Points Date: 6/3/2020   MRN: [id number] Sex: Female   Age: 75 y.o. Ethnicity: Non-/Non    : 1945 Race: Emeli Good is here for Medicare AWV    Screenings for behavioral, psychosocial and functional/safety risks, and cognitive dysfunction are all negative except as indicated below. These results, as well as other patient data from the [address] form, are documented in Flowsheets linked to this Encounter. Allergies   Allergen Reactions    Neosporin [Neomycin-Polymyx-Gramicid] Swelling       Prior to Visit Medications    Medication Sig Taking?  Authorizing Provider   famotidine (PEPCID) 20 MG tablet TAKE 1 TABLET BY MOUTH TWICE DAILY Yes Roz Cardona MD   ondansetron (ZOFRAN) 8 MG tablet TK 1 T PO Q 8 H PRF NAUSEA OR VOM Yes Roz Cardona MD   triamterene-hydroCHLOROthiazide (MAXZIDE-25) 37.5-25 MG per tablet Take 1 tablet by mouth every morning Yes Roz Cardona MD   lisinopril (PRINIVIL;ZESTRIL) 10 MG tablet TAKE 1 TABLET BY MOUTH EVERY DAY Yes Roz Cardona MD   atorvastatin (LIPITOR) 40 MG tablet Take 1 tablet by mouth nightly Yes Roz Cardona MD   sertraline (ZOLOFT) 50 MG tablet Take 1 tablet by mouth every morning Yes Roz Cardona MD   Acetaminophen (TYLENOL EX ST ARTHRITIS PAIN PO) Take by mouth daily as needed TAKE PRN Yes Historical Provider, MD   magnesium oxide (MAG-OX) 400 MG tablet Take 400 mg by mouth 2 times daily Yes Historical Provider, MD   IRON PO Take by mouth 2 times daily Over The Counter Yes Historical Provider, MD       Past Medical History:   Diagnosis Date    Abnormal EKG      see ekg report-2016-\"have appt 2016 to see Dr He Bro to get heart clearance for surgery - 2016\"    Anemia     Chemotherapy induced diarrhea     Chronic kidney disease, stage 3 (HCC)     Colon Cancer Dx 1-    Colonoscopy, tx surg- following with Dr Black to start chemo    DVT (deep vein thrombosis) in pregnancy     Essential hypertension     History of blood transfusion 1960's    No Reaction To Blood Transfusion Received    Hx of blood clots     left leg    Hx of colonic polyp     Hyperlipidemia     Hypertension     Impaired fasting glucose     Malignant neoplasm of colon (HCC)     PONV (postoperative nausea and vomiting)     denies hx of motion sickness    Shortness of breath on exertion     Teeth missing     Upper And Lower    UTI (urinary tract infection) In Past    No Current Symptoms    Wears dentures     Full Upper, Partial Lower    Wears glasses     Wears partial dentures     Lower       Past Surgical History:   Procedure Laterality Date    APPENDECTOMY  1960    BACK SURGERY  2012    ACF C4, C5 With Hardware    BLADDER SUSPENSION  2002    Done With Vaginal Hysterectomy    CHOLECYSTECTOMY, LAPAROSCOPIC  11/21/2016    COLONOSCOPY  7-17-13    Polyps x2, Internal hemorrhoids    COLONOSCOPY  01/08/2019    Ulcerated mass in sigmoid at 15 cm, Internal grade 1 hemorrhoids, otherwise normal to 40cm    COLONOSCOPY  10/17/2019    grade 1 int hem, s/p sigmoid resection, repeat in 3 years    COLONOSCOPY N/A 10/17/2019    COLORECTAL CANCER SCREENING, HIGH RISK performed by Zoraida Mcclendon MD at [address] Left 2016    Broken Left Wrist With Hardware    HYSTERECTOMY VAGINAL  2002    Bladder Suspension Also Done    INSERTION / REMOVAL / REPLACEMENT VENOUS ACCESS CATHETER Right 2/20/2019    PORT INSERTION performed by Keo Richardson MD at [address] N/A 1/8/2019    [address] with tattoo performed by Zoraida Mcclendon MD at [place] [address] N/A 1/16/2019    BOWEL RESECTION SIGMOID performed by Keo Richardson MD at [address] [place] [address]  Late 1970's    TUNNELED VENOUS PORT PLACEMENT  02/20/2019       Family History   Problem Relation Age of Onset    Diabetes Mother     Cancer Mother         \"Brain Cancer\"    Tuberculosis Father     Diabetes Sister     COPD Sister     Cancer Brother         Lung Cancer    Early Death Brother 61        Lung Cancer    Early Death Daughter 47        Lung Cancer    Cancer Daughter         Lung Cancer    Cancer Daughter         Breast Cancer    Breast Cancer Daughter     Early Death Daughter 52        Breast Cancer       CareTeam (Including outside providers/suppliers regularly involved in providing care):   Patient Care Team:  Yoandy Shane MD as PCP - Dannielle Goel MD as PCP - [facility] Empaneled Provider    Wt Readings from Last 3 Encounters:   06/03/20 186 lb 15.2 oz (84.8 kg)   06/02/20 187 lb (84.8 kg)   12/02/19 194 lb 9.6 oz (88.3 kg)     Vitals:    06/03/20 1352   Temp: 97 °F (36.1 °C)   TempSrc: Oral   Weight: 186 lb 15.2 oz (84.8 kg)   Height: 5' 0.98\" (1.549 m)     Body mass index is 35.34 kg/m². Based upon direct observation of the patient, evaluation of cognition reveals recent and remote memory intact. Patient's complete Health Risk Assessment and screening values have been reviewed and are found in Flowsheets. The following problems were reviewed today and where indicated follow up appointments were made and/or referrals ordered. Positive Risk Factor Screenings with Interventions:     General Health:  General  In general, how would you say your health is?: Good  In the past 7 days, have you experienced any of the following?  New or Increased Pain, New or Increased Fatigue, Loneliness, Social Isolation, Stress or Anger?: (!) Stress  Do you get the social and emotional support that you need?: Yes  Do you have a Living Will?: (!) No  General Health Risk Interventions:  · Stress: relaxation techniques discussed  · No Living Will: additional information provided verbally due to being virtual visit    Health Habits/Nutrition:  Health Habits/Nutrition  Do you exercise for at least 20 minutes 2-3 times per week?: Yes  Have you lost any weight without trying in the past 3 months?: No  Do you eat fewer than 2 meals per day?: No  Have you seen a dentist within the past year?: (!) No  Body mass index is 35.34 kg/m². Health Habits/Nutrition Interventions:  · Dental exam overdue:  patient encouraged to make appointment with his/her dentist    Hearing/Vision:  No exam data present  Hearing/Vision  Do you or your family notice any trouble with your hearing?: No  Do you have difficulty driving, watching TV, or doing any of your daily activities because of your eyesight?: No  Have you had an eye exam within the past year?: (!) No  Hearing/Vision Interventions:  · Vision concerns:  patient encouraged to make appointment with his/her eye specialist    Safety:  Safety  Do you have working smoke detectors?: Yes  Have all throw rugs been removed or fastened?: Yes  Do you have non-slip mats or surfaces in all bathtubs/showers?: (!) No  Do all of your stairways have a railing or banister?: (!) No(No stairs)  Are your doorways, halls and stairs free of clutter?: Yes  Do you always fasten your seatbelt when you are in a car?: Yes  Safety Interventions:  · Home safety tips provided verbally due to being virtual visit.      Personalized Preventive Plan   Current Health Maintenance Status  Immunization History   Administered Date(s) Administered    Influenza Virus Vaccine 12/23/2014, 01/05/2018, 12/13/2018    Influenza, High Dose (Fluzone 65 yrs and older) 12/12/2018    Influenza, Triv, inactivated, subunit, adjuvanted, IM (Fluad 65 yrs and older) 12/03/2019    Pneumococcal Conjugate 13-valent (Solis Meckel) 06/23/2015, 12/12/2015    Pneumococcal Polysaccharide ([id number]) 03/27/2012    Tdap (Boostrix, Adacel) 03/25/2011    Zoster Live (Zostavax) 03/27/2012        Health Maintenance   Topic Date Due    DEXA (modify frequency per FRAX score)  12/10/1999    Shingles Vaccine (2 of 3) 05/22/2012    Annual Wellness Visit (AWV)  06/23/2019    A1C test (Diabetic or Prediabetic)  12/02/2020    Lipid screen  12/02/2020    Potassium monitoring  12/13/2020    Creatinine monitoring  12/13/2020    DTaP/Tdap/Td vaccine (2 - Td) 03/25/2021    Colon cancer screen colonoscopy  10/17/2022    Flu vaccine  Completed    Pneumococcal 65+ years Vaccine  Completed    Hepatitis A vaccine  Aged Out    Hepatitis B vaccine  Aged Out    Hib vaccine  Aged Out    Meningococcal (ACWY) vaccine  Aged Out     Recommendations for [employer] Due: see orders and patient instructions/AVS.  . Recommended screening schedule for the next 5-10 years is provided to the patient in written form: see Patient Instructions/AVS.    Terrance Martin LPN, [birth year], performed the documented evaluation under the direct supervision of the attending physician. Tiffanie Costa is a 75 y.o. female being evaluated by a Virtual Visit (audio visit) encounter to address concerns as mentioned above. A caregiver was present when appropriate. Due to this being a TeleHealth encounter (During VNDKQ-00 public health emergency), evaluation of the following organ systems was limited: Vitals/Constitutional/EENT/Resp/CV/GI//MS/Neuro/Skin/Heme-Lymph-Imm. Pursuant to the emergency declaration under the [address] [place] [address], [address] [place] [address] authority and the [employer] and [employer] Act, this Virtual Visit was conducted with patient's (and/or legal guardian's) consent, to reduce the patient's risk of exposure to COVID-19 and provide necessary medical care. The patient (and/or legal guardian) has also been advised to contact this office for worsening conditions or problems, and seek emergency medical treatment and/or call 911 if deemed necessary.      Patient identification was verified at the start of the visit: Yes    Total time spent for this encounter: 15 minutes    Services were provided through audio synchronous discussion virtually to substitute for in-person clinic visit. Patient and provider were located at their individual homes. This encounter was performed under Leonela ang MDs, direct supervision, 6/3/2020.    --Rustam Olmos LPN on 2/2/2520 at 4:14 PM    An electronic signature was used to authenticate this note.

## 2020-07-16 ENCOUNTER — TELEPHONE (OUTPATIENT)
Dept: FAMILY MEDICINE CLINIC | Age: 75
End: 2020-07-16

## 2020-07-16 ENCOUNTER — VIRTUAL VISIT (OUTPATIENT)
Dept: FAMILY MEDICINE CLINIC | Age: 75
End: 2020-07-16
Payer: MEDICARE

## 2020-07-16 PROCEDURE — 4040F PNEUMOC VAC/ADMIN/RCVD: CPT | Performed by: FAMILY MEDICINE

## 2020-07-16 PROCEDURE — 1090F PRES/ABSN URINE INCON ASSESS: CPT | Performed by: FAMILY MEDICINE

## 2020-07-16 PROCEDURE — 99213 OFFICE O/P EST LOW 20 MIN: CPT | Performed by: FAMILY MEDICINE

## 2020-07-16 PROCEDURE — 3017F COLORECTAL CA SCREEN DOC REV: CPT | Performed by: FAMILY MEDICINE

## 2020-07-16 PROCEDURE — G8400 PT W/DXA NO RESULTS DOC: HCPCS | Performed by: FAMILY MEDICINE

## 2020-07-16 PROCEDURE — G8428 CUR MEDS NOT DOCUMENT: HCPCS | Performed by: FAMILY MEDICINE

## 2020-07-16 PROCEDURE — 1123F ACP DISCUSS/DSCN MKR DOCD: CPT | Performed by: FAMILY MEDICINE

## 2020-07-16 ASSESSMENT — ENCOUNTER SYMPTOMS
COUGH: 0
NAUSEA: 1
SHORTNESS OF BREATH: 0
ABDOMINAL PAIN: 1
BLOOD IN STOOL: 0
CONSTIPATION: 1
VOMITING: 0
DIARRHEA: 1

## 2020-07-16 NOTE — TELEPHONE ENCOUNTER
Called pt and asked all the questions and she states that she only has cramping and diarrhea and this has been going on since before the Covid

## 2020-07-16 NOTE — TELEPHONE ENCOUNTER
----- Message from Luciano Healyma sent at 7/16/2020 11:16 AM EDT -----  Please call the patient and see if she is having any other symptoms besides the diarrhea/abdominal pain, fever, chills, loss of her sense of smell or taste, any other GI symptoms like nausea, vomiting. Any shortness of breath or cough? They scheduled her as a virtual visit but she may need to come in and be seen in person or be evaluated at the [facility] or someone she can be tested for COVID based on her answers.     Thanks,   Jazmyn

## 2020-07-16 NOTE — PROGRESS NOTES
2020    TELEHEALTH EVALUATION -- Audio/Visual (During LCHNJ-86 public health emergency)    HPI:    Deepika Stoner (:  1945) has requested an audio/video evaluation for the following concern(s): This is a sick office visit for the patient due to prolonged episodes of diarrhea. Notes that at least once a week she has diarrhea, but will have solid bowel movements in between those episodes. Has a hx of colon ca, and extra colonoscopies and CT scans which cause the diarrhea as well. Most recent colonoscopy was in 2019, which appeared to be within normal limits, and Dr. Rocío Santos stated that she needed a repeat in 3 years. Did have a CT of her abdomen and pelvis back at the beginning of  which also did not show any irregularities of the bowel. She states that there is no real pattern to her episodes of diarrhea and any relation to food. No one else in her home has had issues with this. Denies any dark or tarry colored stools, black stools, bright red blood per rectum, or melena. Notes that she was previously taking an iron supplement, but this was stopped in . She notes that her stools have lightened since stopping the iron. Denies any prolonged abdominal pain, but reports that her abd is tender after the episode of diarrhea that lasts about a day that she describes as a dull ache. Also notes she has episodes of nausea, especially with increased stress which occurs frequently due to COVID-19, but has not had any episodes of vomiting. Denies any abd distension, bloating, or decrease in appetite. She does have a history of GERD and takes Pepcid daily. Takes pepcid daily. She has tried imodium and then she has \"drier\" stools that are more pebble like. No thinning of the caliber of her stools. Typically has a BM daily, but may be 2 days between bowel movements if she takes imodium.        Denies any fevers, chills, increased fatigue, blurred vision or double vision, cough, shortness of breath, chest pain, palpitations, leg swelling, dizziness, headaches, or lightheadedness. Review of Systems   Constitutional: Negative for chills, fatigue and fever. Eyes: Negative for visual disturbance (no blurred or double vision. ). Respiratory: Negative for cough and shortness of breath. Cardiovascular: Negative for chest pain, palpitations and leg swelling. Gastrointestinal: Positive for abdominal pain (with diarrhea), constipation, diarrhea and nausea. Negative for blood in stool (denies BRBPR, hematochezia, and melena) and vomiting. Neurological: Negative for dizziness, light-headedness and headaches. Prior to Visit Medications    Medication Sig Taking? Authorizing Provider   famotidine (PEPCID) 20 MG tablet TAKE 1 TABLET BY MOUTH TWICE DAILY  Ayo Rodriguez MD   ondansetron (ZOFRAN) 8 MG tablet TK 1 T PO Q 8 H PRF NAUSEA OR VOM  Ayo Rodriguez MD   triamterene-hydroCHLOROthiazide (MAXZIDE-25) 37.5-25 MG per tablet Take 1 tablet by mouth every morning  Ayo Rodriguez MD   lisinopril (PRINIVIL;ZESTRIL) 10 MG tablet TAKE 1 TABLET BY MOUTH EVERY DAY  Ayo Rodriguez MD   atorvastatin (LIPITOR) 40 MG tablet Take 1 tablet by mouth nightly  Ayo Rodriguez MD   sertraline (ZOLOFT) 50 MG tablet Take 1 tablet by mouth every morning  Ayo Rodriguez MD   Acetaminophen (TYLENOL EX ST ARTHRITIS PAIN PO) Take by mouth daily as needed TAKE PRN  Historical Provider, MD   magnesium oxide (MAG-OX) 400 MG tablet Take 400 mg by mouth 2 times daily  Historical Provider, MD   IRON PO Take by mouth 2 times daily Over The Counter  Historical Provider, MD       Social History     Tobacco Use    Smoking status: Never Smoker    Smokeless tobacco: Never Used   Substance Use Topics    Alcohol use: No    Drug use:  No            PHYSICAL EXAMINATION:  [ INSTRUCTIONS:  \"[x]\" Indicates a positive item  \"[]\" Indicates a negative item  -- DELETE ALL ITEMS NOT EXAMINED]  Vital Signs: (As obtained by patient/caregiver or practitioner observation)    Patient-Reported Vitals 7/16/2020   Patient-Reported Weight 200lbs   Patient-Reported Height 5' 1\"   No other vitals were able to be obtained at this time. Constitutional: [x] Appears well-developed and well-nourished [x] No apparent distress      [] Abnormal-   Mental status  [x] Alert and awake  [x] Oriented to person/place/time [x]Able to follow commands      Eyes:  EOM    [x]  Normal  [] Abnormal-  Sclera  []  Normal  [] Abnormal -         Discharge []  None visible  [] Abnormal -    HENT:   [x] Normocephalic, atraumatic. [] Abnormal   [] Mouth/Throat: Mucous membranes are moist.     External Ears [x] Normal  [] Abnormal-     Neck: [x] No visualized mass     Pulmonary/Chest: [x] Respiratory effort normal.  [x] No visualized signs of difficulty breathing or respiratory distress        [] Abnormal-      Musculoskeletal:   [] Normal gait with no signs of ataxia         [] Normal range of motion of neck        [] Abnormal-       Neurological:        [x] No Facial Asymmetry (Cranial nerve 7 motor function) (limited exam to video visit)          [] No gaze palsy        [] Abnormal-         Skin:        [x] No significant exanthematous lesions or discoloration noted on facial skin         [] Abnormal-            Psychiatric:       [x] Normal Affect [] No Hallucinations        [] Abnormal-     Other pertinent observable physical exam findings-     ASSESSMENT/PLAN:  1. Diarrhea, unspecified type  Had a long discussion of treatment options, but did encourage the patient to use probiotics and increase fiber in her diet through supplement like Metamucil. She is to monitor her diet and see if there is any pattern associated with the diarrhea that way.   Is to follow-up in office early next week for a physical abdominal exam.  We will have her complete Hemoccult test as outlined below which she is to  tomorrow 7/17/2018 in hopes that this can be completed by the time of her appointment occurs on 7/21/2020. Patient was encouraged to seek care at an urgent care or ER over the weekend if abdominal pain worsens in any way, or if she has any hematochezia, melena, blood in her stool, or bright red blood per rectum.  - BLOOD OCCULT STOOL #1; Future    2. Cancer of sigmoid colon ([address]  See above (1).  - BLOOD OCCULT STOOL #1; Future    3. Iron deficiency  Due to the patient's history of iron deficiency anemia, and stopping her iron supplementation recently, I would like to recheck her labs as outlined below, which will also help us evaluate for possible GI bleed. - CBC Auto Differential; Future  - Comprehensive Metabolic Panel; Future  - Ferritin; Future  - IRON AND TIBC; Future    Patient is to follow-up on 7/21/2020, unless otherwise needed in that time. Pt is to call with any questions, concerns, or increase in symptoms. Pt verbalized understanding of and agreement with current plan of care. No follow-ups on file. Glenna Tubbs is a 75 y.o. female being evaluated by a Virtual Visit (video visit) encounter to address concerns as mentioned above. A caregiver was present when appropriate. Due to this being a TeleHealth encounter ([facility]), evaluation of the following organ systems was limited: Vitals/Constitutional/EENT/Resp/CV/GI//MS/Neuro/Skin/Heme-Lymph-Imm. Pursuant to the emergency declaration under the [facility] [address] authority and the [employer] and [employer] Act, this Virtual Visit was conducted with patient's (and/or legal guardian's) consent, to reduce the patient's risk of exposure to COVID-19 and provide necessary medical care. The patient (and/or legal guardian) has also been advised to contact this office for worsening conditions or problems, and seek emergency medical treatment and/or call 911 if deemed necessary. Patient identification was verified at the start of the visit: Yes    Total time spent on this encounter: 15 min    Services were provided through a video synchronous discussion virtually to substitute for in-person clinic visit. Patient and provider were located at their individual homes. --COLUMBA Lacy on 7/16/2020 at 3:07 PM    An electronic signature was used to authenticate this note.

## 2020-07-17 ENCOUNTER — TELEPHONE (OUTPATIENT)
Dept: FAMILY MEDICINE CLINIC | Age: 75
End: 2020-07-17

## 2020-07-17 DIAGNOSIS — E61.1 IRON DEFICIENCY: Chronic | ICD-10-CM

## 2020-07-17 LAB
A/G RATIO: 1.7 (ref 1.1–2.2)
ALBUMIN SERPL-MCNC: 4.3 G/DL (ref 3.4–5)
ALP BLD-CCNC: 108 U/L (ref 40–129)
ALT SERPL-CCNC: 11 U/L (ref 10–40)
ANION GAP SERPL CALCULATED.3IONS-SCNC: 14 MMOL/L (ref 3–16)
AST SERPL-CCNC: 13 U/L (ref 15–37)
BASOPHILS ABSOLUTE: 0 K/UL (ref 0–0.2)
BASOPHILS RELATIVE PERCENT: 0.6 %
BILIRUB SERPL-MCNC: 0.3 MG/DL (ref 0–1)
BUN BLDV-MCNC: 47 MG/DL (ref 7–20)
CALCIUM SERPL-MCNC: 9.7 MG/DL (ref 8.3–10.6)
CHLORIDE BLD-SCNC: 105 MMOL/L (ref 99–110)
CO2: 20 MMOL/L (ref 21–32)
CREAT SERPL-MCNC: 2 MG/DL (ref 0.6–1.2)
EOSINOPHILS ABSOLUTE: 0.2 K/UL (ref 0–0.6)
EOSINOPHILS RELATIVE PERCENT: 3.2 %
FERRITIN: 18 NG/ML (ref 15–150)
GFR AFRICAN AMERICAN: 29
GFR NON-AFRICAN AMERICAN: 24
GLOBULIN: 2.5 G/DL
GLUCOSE BLD-MCNC: 140 MG/DL (ref 70–99)
HCT VFR BLD CALC: 36.8 % (ref 36–48)
HEMOGLOBIN: 11.7 G/DL (ref 12–16)
IRON SATURATION: 20 % (ref 15–50)
IRON: 67 UG/DL (ref 37–145)
LYMPHOCYTES ABSOLUTE: 1.1 K/UL (ref 1–5.1)
LYMPHOCYTES RELATIVE PERCENT: 21.6 %
MCH RBC QN AUTO: 26.2 PG (ref 26–34)
MCHC RBC AUTO-ENTMCNC: 31.9 G/DL (ref 31–36)
MCV RBC AUTO: 82.1 FL (ref 80–100)
MONOCYTES ABSOLUTE: 0.5 K/UL (ref 0–1.3)
MONOCYTES RELATIVE PERCENT: 9.6 %
NEUTROPHILS ABSOLUTE: 3.3 K/UL (ref 1.7–7.7)
NEUTROPHILS RELATIVE PERCENT: 65 %
PDW BLD-RTO: 14.4 % (ref 12.4–15.4)
PLATELET # BLD: 164 K/UL (ref 135–450)
PMV BLD AUTO: 9 FL (ref 5–10.5)
POTASSIUM SERPL-SCNC: 4.7 MMOL/L (ref 3.5–5.1)
RBC # BLD: 4.48 M/UL (ref 4–5.2)
SODIUM BLD-SCNC: 139 MMOL/L (ref 136–145)
TOTAL IRON BINDING CAPACITY: 333 UG/DL (ref 260–445)
TOTAL PROTEIN: 6.8 G/DL (ref 6.4–8.2)
WBC # BLD: 5 K/UL (ref 4–11)

## 2020-07-17 NOTE — TELEPHONE ENCOUNTER
----- Message from Gretta Alcala, [address] sent at 7/16/2020 10:18 PM EDT -----  Could you please put this patient on my schedule for 7/21/2020 at 1:30 PM for a 30-minute appointment? I already discussed this follow-up visit with her during her appointment today so she is already aware.     Thanks, Jazmyn

## 2020-07-21 ENCOUNTER — OFFICE VISIT (OUTPATIENT)
Dept: FAMILY MEDICINE CLINIC | Age: 75
End: 2020-07-21
Payer: MEDICARE

## 2020-07-21 VITALS
SYSTOLIC BLOOD PRESSURE: 115 MMHG | BODY MASS INDEX: 38.09 KG/M2 | HEIGHT: 60 IN | WEIGHT: 194 LBS | DIASTOLIC BLOOD PRESSURE: 68 MMHG | OXYGEN SATURATION: 97 % | TEMPERATURE: 98 F | HEART RATE: 97 BPM

## 2020-07-21 PROCEDURE — 1036F TOBACCO NON-USER: CPT | Performed by: FAMILY MEDICINE

## 2020-07-21 PROCEDURE — G8400 PT W/DXA NO RESULTS DOC: HCPCS | Performed by: FAMILY MEDICINE

## 2020-07-21 PROCEDURE — 99213 OFFICE O/P EST LOW 20 MIN: CPT | Performed by: FAMILY MEDICINE

## 2020-07-21 PROCEDURE — 3017F COLORECTAL CA SCREEN DOC REV: CPT | Performed by: FAMILY MEDICINE

## 2020-07-21 PROCEDURE — G8417 CALC BMI ABV UP PARAM F/U: HCPCS | Performed by: FAMILY MEDICINE

## 2020-07-21 PROCEDURE — 4040F PNEUMOC VAC/ADMIN/RCVD: CPT | Performed by: FAMILY MEDICINE

## 2020-07-21 PROCEDURE — 1123F ACP DISCUSS/DSCN MKR DOCD: CPT | Performed by: FAMILY MEDICINE

## 2020-07-21 PROCEDURE — 1090F PRES/ABSN URINE INCON ASSESS: CPT | Performed by: FAMILY MEDICINE

## 2020-07-21 PROCEDURE — G8427 DOCREV CUR MEDS BY ELIG CLIN: HCPCS | Performed by: FAMILY MEDICINE

## 2020-07-21 ASSESSMENT — ENCOUNTER SYMPTOMS
BLOOD IN STOOL: 0
CONSTIPATION: 0
ABDOMINAL PAIN: 1
VOMITING: 0
DIARRHEA: 1
SHORTNESS OF BREATH: 0
NAUSEA: 1
COUGH: 0

## 2020-07-21 NOTE — PROGRESS NOTES
7/21/2020     Jennifer Melton is a 75 y.o. female who presents today with:  Chief Complaint   Patient presents with    Other     pt is here for f/u on diarreah. Pt states she had it before she came . Pt had chemo last year    . /68   Pulse 97   Temp 98 °F (36.7 °C)   Ht 5' (1.524 m)   Wt 194 lb (88 kg)   SpO2 97%   BMI 37.89 kg/m²     HPI  Patient is here for an in person exam of her abdominal pain and further evaluation of her diarrhea. History was obtained from the pt. her symptoms are relatively unchanged from her previous appointment last week. Does note that she had diarrhea today, and describes her abd pain as a muscle soreness, and intermittent nausea after having her episode of diarrhea. She has some abd pain leading up to her diarrhea but once she has that bowel movement this resolves and she is left with the muscle soreness. Patient does have a history of colon cancer. Denies any fevers, chills, increased fatigue, blurred vision or double vision, cough, shortness of breath, chest pain, palpitations, leg swelling, dizziness, headaches, or lightheadedness. REVIEW OF SYMPTOMS    Review of Systems   Constitutional: Negative for chills, fatigue and fever. Eyes: Negative for visual disturbance (no blurred or double vision. ). Respiratory: Negative for cough and shortness of breath. Cardiovascular: Negative for chest pain, palpitations and leg swelling. Gastrointestinal: Positive for abdominal pain, diarrhea and nausea (after diarrhea). Negative for blood in stool (hx of hemorrhoids), constipation and vomiting. Neurological: Negative for dizziness, light-headedness and headaches.        PAST MEDICAL HISTORY  Past Medical History:   Diagnosis Date    Abnormal EKG      see ekg report-11/4/2016-\"have appt 11/18/2016 to see Dr Jaime Goss to get heart clearance for surgery - 11/21/2016\"    Anemia     Chemotherapy induced diarrhea     Chronic kidney disease, stage 3 ([facility] [address]     Colon Cancer Dx 1-8-19    Colonoscopy, tx surg- following with Dr Black to start chemo    DVT (deep vein thrombosis) in pregnancy     Essential hypertension     History of blood transfusion 1960's    No Reaction To Blood Transfusion Received    Hx of blood clots     left leg    Hx of colonic polyp     Hyperlipidemia     Hypertension     Impaired fasting glucose     Malignant neoplasm of colon (HCC)     PONV (postoperative nausea and vomiting)     denies hx of motion sickness    Shortness of breath on exertion     Teeth missing     Upper And Lower    UTI (urinary tract infection) In Past    No Current Symptoms    Wears dentures     Full Upper, Partial Lower    Wears glasses     Wears partial dentures     Lower       FAMILY HISTORY  Family History   Problem Relation Age of Onset    Diabetes Mother     Cancer Mother         \"Brain Cancer\"    Tuberculosis Father     Diabetes Sister     COPD Sister     Cancer Brother         Lung Cancer    Early Death Brother 61        Lung Cancer    Early Death Daughter 47        Lung Cancer    Cancer Daughter         Lung Cancer    Cancer Daughter         Breast Cancer    Breast Cancer Daughter     Early Death Daughter 52        Breast Cancer       SOCIAL HISTORY  Social History     Socioeconomic History    Marital status:      Spouse name: None    Number of children: None    Years of education: None    Highest education level: None   Occupational History    None   Social Needs    Financial resource strain: None    Food insecurity     Worry: None     Inability: None    Transportation needs     Medical: None     Non-medical: None   Tobacco Use    Smoking status: Never Smoker    Smokeless tobacco: Never Used   Substance and Sexual Activity    Alcohol use: No    Drug use: No    Sexual activity: Never   Lifestyle    Physical activity     Days per week: None     Minutes per session: None    Stress: None   Relationships    Social connections tablet TAKE 1 TABLET BY MOUTH TWICE DAILY 180 tablet 1    ondansetron (ZOFRAN) 8 MG tablet TK 1 T PO Q 8 H PRF NAUSEA OR VOM 20 tablet 3    triamterene-hydroCHLOROthiazide (MAXZIDE-25) 37.5-25 MG per tablet Take 1 tablet by mouth every morning 90 tablet 1    lisinopril (PRINIVIL;ZESTRIL) 10 MG tablet TAKE 1 TABLET BY MOUTH EVERY DAY 90 tablet 1    atorvastatin (LIPITOR) 40 MG tablet Take 1 tablet by mouth nightly 90 tablet 1    sertraline (ZOLOFT) 50 MG tablet Take 1 tablet by mouth every morning 90 tablet 1    Acetaminophen (TYLENOL EX ST ARTHRITIS PAIN PO) Take by mouth daily as needed TAKE PRN      magnesium oxide (MAG-OX) 400 MG tablet Take 400 mg by mouth 2 times daily      IRON PO Take by mouth 2 times daily Over The Counter       No current facility-administered medications for this visit. ALLERGIES  Allergies   Allergen Reactions    Neosporin [Neomycin-Polymyx-Gramicid] Swelling       PHYSICAL EXAM    Physical Exam  Vitals signs and nursing note reviewed. Constitutional:       General: She is not in acute distress. Appearance: She is well-developed. She is not diaphoretic. HENT:      Head: Normocephalic and atraumatic. Cardiovascular:      Rate and Rhythm: Normal rate and regular rhythm. Heart sounds: Normal heart sounds. Pulmonary:      Effort: Pulmonary effort is normal. No respiratory distress. Breath sounds: Normal breath sounds. Abdominal:      General: Bowel sounds are normal.      Palpations: Abdomen is soft. Tenderness: There is abdominal tenderness (epigastric). Skin:     General: Skin is warm and dry. Neurological:      Mental Status: She is alert and oriented to person, place, and time. Psychiatric:         Thought Content: Thought content normal.         ASSESSMENT & PLAN    1. Diarrhea, unspecified type  Patient recently had an abdominal CT (6/1/2020) that did not show any abnormalities.   Her symptoms that started before then so we will hold on repeating this at this time. She also recently had lab work completed that was within normal limits. We will complete a stool culture to be sure there is no bacterial cause for her diarrhea. If this comes back negative we will consider GI referral.  - GI Bacterial Pathogens By PCR; Future    2. Epigastric pain  See above (#1).  - GI Bacterial Pathogens By PCR; Future    3. Cancer of sigmoid colon ([facility] [address]  See above (#1).  - GI Bacterial Pathogens By PCR; Future    Patient is to follow-up in approximately 1 month, unless otherwise needed in that time. Pt is to call with any questions, concerns, or increase in symptoms. Pt verbalized understanding of and agreement with current plan of care. Electronically signed by COLUMBA Campos on 7/21/2020      Please note that this chart was generated using dragon dictation software. Although every effort was made to ensure the accuracy of this automated transcription, some errors in transcription may have occurred.

## 2020-07-23 DIAGNOSIS — R19.7 DIARRHEA, UNSPECIFIED TYPE: ICD-10-CM

## 2020-07-23 LAB
C DIFF TOXIN/ANTIGEN: NORMAL
GI BACTERIAL PATHOGENS BY PCR: NORMAL

## 2020-08-05 DIAGNOSIS — R19.7 DIARRHEA, UNSPECIFIED TYPE: ICD-10-CM

## 2020-08-05 DIAGNOSIS — C18.7 CANCER OF SIGMOID COLON (HCC): ICD-10-CM

## 2020-08-05 DIAGNOSIS — R10.13 EPIGASTRIC PAIN: ICD-10-CM

## 2020-08-06 LAB — GI BACTERIAL PATHOGENS BY PCR: NORMAL

## 2020-08-09 ASSESSMENT — PAIN DESCRIPTION - LOCATION: LOCATION: ABDOMEN

## 2020-08-09 ASSESSMENT — PAIN DESCRIPTION - PAIN TYPE: TYPE: ACUTE PAIN

## 2020-08-10 ENCOUNTER — APPOINTMENT (OUTPATIENT)
Dept: CT IMAGING | Age: 75
DRG: 392 | End: 2020-08-10
Payer: MEDICARE

## 2020-08-10 ENCOUNTER — HOSPITAL ENCOUNTER (INPATIENT)
Age: 75
LOS: 2 days | Discharge: HOME | DRG: 392 | End: 2020-08-12
Attending: INTERNAL MEDICINE | Admitting: INTERNAL MEDICINE
Payer: MEDICARE

## 2020-08-10 PROBLEM — R19.7 DIARRHEA: Status: ACTIVE | Noted: 2020-08-10

## 2020-08-10 LAB
ADENOVIRUS F 40 41 PCR: NOT DETECTED
ALBUMIN SERPL-MCNC: 4.1 GM/DL (ref 3.4–5)
ALP BLD-CCNC: 112 IU/L (ref 40–129)
ALT SERPL-CCNC: 14 U/L (ref 10–40)
ANION GAP SERPL CALCULATED.3IONS-SCNC: 13 MMOL/L (ref 4–16)
AST SERPL-CCNC: 18 IU/L (ref 15–37)
ASTROVIRUS PCR: NOT DETECTED
BACTERIA: ABNORMAL /HPF
BASOPHILS ABSOLUTE: 0 K/CU MM
BASOPHILS RELATIVE PERCENT: 0.3 % (ref 0–1)
BILIRUB SERPL-MCNC: 0.3 MG/DL (ref 0–1)
BILIRUBIN URINE: NEGATIVE MG/DL
BLOOD, URINE: ABNORMAL
BUN BLDV-MCNC: 43 MG/DL (ref 6–23)
CALCIUM SERPL-MCNC: 10 MG/DL (ref 8.3–10.6)
CAMPYLOBACTER PCR: NOT DETECTED
CHLORIDE BLD-SCNC: 102 MMOL/L (ref 99–110)
CLARITY: ABNORMAL
CO2: 21 MMOL/L (ref 21–32)
COLOR: YELLOW
CREAT SERPL-MCNC: 2.7 MG/DL (ref 0.6–1.1)
CRYPTOSPORIDIUM PCR: NOT DETECTED
CYCLOSPORA CAYETANENSIS PCR: NOT DETECTED
DIFFERENTIAL TYPE: ABNORMAL
E COLI 0157 PCR: NOT DETECTED
E COLI ENTEROAGGREGATIVE PCR: NOT DETECTED
E COLI ENTEROPATHOGENIC PCR: NOT DETECTED
E COLI ENTEROTOXIGENIC PCR: NOT DETECTED
E COLI SHIGA LIKE TOXIN PCR: NOT DETECTED
E COLI SHIGELLA/ENTEROINVASIVE PCR: NOT DETECTED
ENTAMOEBA HISTOLYTICA PCR: NOT DETECTED
EOSINOPHILS ABSOLUTE: 0 K/CU MM
EOSINOPHILS RELATIVE PERCENT: 0 % (ref 0–3)
GFR AFRICAN AMERICAN: 21 ML/MIN/1.73M2
GFR NON-AFRICAN AMERICAN: 17 ML/MIN/1.73M2
GIARDIA LAMBLIA PCR: NOT DETECTED
GLUCOSE BLD-MCNC: 183 MG/DL (ref 70–99)
GLUCOSE, URINE: NEGATIVE MG/DL
HCT VFR BLD CALC: 37.8 % (ref 37–47)
HEMOGLOBIN: 11.8 GM/DL (ref 12.5–16)
HYALINE CASTS: 5 /LPF
IMMATURE NEUTROPHIL %: 0.5 % (ref 0–0.43)
KETONES, URINE: NEGATIVE MG/DL
LACTATE: 1.2 MMOL/L (ref 0.4–2)
LEUKOCYTE ESTERASE, URINE: ABNORMAL
LIPASE: 27 IU/L (ref 13–60)
LYMPHOCYTES ABSOLUTE: 0.7 K/CU MM
LYMPHOCYTES RELATIVE PERCENT: 4.9 % (ref 24–44)
MCH RBC QN AUTO: 25.9 PG (ref 27–31)
MCHC RBC AUTO-ENTMCNC: 31.2 % (ref 32–36)
MCV RBC AUTO: 83.1 FL (ref 78–100)
MONOCYTES ABSOLUTE: 0.6 K/CU MM
MONOCYTES RELATIVE PERCENT: 4.7 % (ref 0–4)
MUCUS: ABNORMAL HPF
NITRITE URINE, QUANTITATIVE: NEGATIVE
NOROVIRUS GI GII PCR: NOT DETECTED
NUCLEATED RBC %: 0 %
PDW BLD-RTO: 13.6 % (ref 11.7–14.9)
PH, URINE: 5 (ref 5–8)
PLATELET # BLD: 253 K/CU MM (ref 140–440)
PLESIOMONAS SHIGELLOIDES PCR: NOT DETECTED
PMV BLD AUTO: 10.3 FL (ref 7.5–11.1)
POTASSIUM SERPL-SCNC: 5 MMOL/L (ref 3.5–5.1)
PROTEIN UA: NEGATIVE MG/DL
RBC # BLD: 4.55 M/CU MM (ref 4.2–5.4)
RBC URINE: 15 /HPF (ref 0–6)
ROTAVIRUS A PCR: NOT DETECTED
SALMONELLA PCR: NOT DETECTED
SAPOVIRUS PCR: NOT DETECTED
SEGMENTED NEUTROPHILS ABSOLUTE COUNT: 11.9 K/CU MM
SEGMENTED NEUTROPHILS RELATIVE PERCENT: 89.6 % (ref 36–66)
SODIUM BLD-SCNC: 136 MMOL/L (ref 135–145)
SPECIFIC GRAVITY UA: 1.01 (ref 1–1.03)
SQUAMOUS EPITHELIAL: 4 /HPF
TOTAL IMMATURE NEUTOROPHIL: 0.06 K/CU MM
TOTAL NUCLEATED RBC: 0 K/CU MM
TOTAL PROTEIN: 7.2 GM/DL (ref 6.4–8.2)
TRICHOMONAS: ABNORMAL /HPF
UROBILINOGEN, URINE: NORMAL MG/DL (ref 0.2–1)
VIBRIO CHOLERAE PCR: NOT DETECTED
VIBRIO PCR: NOT DETECTED
WBC # BLD: 13.3 K/CU MM (ref 4–10.5)
WBC UA: 31 /HPF (ref 0–5)
YERSINIA ENTEROCOLITICA PCR: NOT DETECTED

## 2020-08-10 PROCEDURE — 99285 EMERGENCY DEPT VISIT HI MDM: CPT

## 2020-08-10 PROCEDURE — 94761 N-INVAS EAR/PLS OXIMETRY MLT: CPT

## 2020-08-10 PROCEDURE — 87086 URINE CULTURE/COLONY COUNT: CPT

## 2020-08-10 PROCEDURE — 87324 CLOSTRIDIUM AG IA: CPT

## 2020-08-10 PROCEDURE — 87186 SC STD MICRODIL/AGAR DIL: CPT

## 2020-08-10 PROCEDURE — 74176 CT ABD & PELVIS W/O CONTRAST: CPT

## 2020-08-10 PROCEDURE — 87077 CULTURE AEROBIC IDENTIFY: CPT

## 2020-08-10 PROCEDURE — 2580000003 HC RX 258: Performed by: PHYSICIAN ASSISTANT

## 2020-08-10 PROCEDURE — 6360000002 HC RX W HCPCS: Performed by: INTERNAL MEDICINE

## 2020-08-10 PROCEDURE — 6360000002 HC RX W HCPCS: Performed by: PHYSICIAN ASSISTANT

## 2020-08-10 PROCEDURE — 1200000000 HC SEMI PRIVATE

## 2020-08-10 PROCEDURE — 6360000002 HC RX W HCPCS

## 2020-08-10 PROCEDURE — 83605 ASSAY OF LACTIC ACID: CPT

## 2020-08-10 PROCEDURE — 85025 COMPLETE CBC W/AUTO DIFF WBC: CPT

## 2020-08-10 PROCEDURE — 2580000003 HC RX 258: Performed by: INTERNAL MEDICINE

## 2020-08-10 PROCEDURE — 6370000000 HC RX 637 (ALT 250 FOR IP): Performed by: INTERNAL MEDICINE

## 2020-08-10 PROCEDURE — 80053 COMPREHEN METABOLIC PANEL: CPT

## 2020-08-10 PROCEDURE — 2500000003 HC RX 250 WO HCPCS: Performed by: INTERNAL MEDICINE

## 2020-08-10 PROCEDURE — 83690 ASSAY OF LIPASE: CPT

## 2020-08-10 PROCEDURE — 87507 IADNA-DNA/RNA PROBE TQ 12-25: CPT

## 2020-08-10 PROCEDURE — 81001 URINALYSIS AUTO W/SCOPE: CPT

## 2020-08-10 RX ORDER — ATORVASTATIN CALCIUM 40 MG/1
40 TABLET, FILM COATED ORAL NIGHTLY
Status: DISCONTINUED | OUTPATIENT
Start: 2020-08-10 | End: 2020-08-12 | Stop reason: HOSPADM

## 2020-08-10 RX ORDER — 0.9 % SODIUM CHLORIDE 0.9 %
1000 INTRAVENOUS SOLUTION INTRAVENOUS ONCE
Status: COMPLETED | OUTPATIENT
Start: 2020-08-10 | End: 2020-08-10

## 2020-08-10 RX ORDER — ONDANSETRON 2 MG/ML
4 INJECTION INTRAMUSCULAR; INTRAVENOUS EVERY 6 HOURS PRN
Status: DISCONTINUED | OUTPATIENT
Start: 2020-08-10 | End: 2020-08-12 | Stop reason: HOSPADM

## 2020-08-10 RX ORDER — SODIUM CHLORIDE 0.9 % (FLUSH) 0.9 %
10 SYRINGE (ML) INJECTION EVERY 12 HOURS SCHEDULED
Status: DISCONTINUED | OUTPATIENT
Start: 2020-08-10 | End: 2020-08-12 | Stop reason: HOSPADM

## 2020-08-10 RX ORDER — CIPROFLOXACIN 2 MG/ML
400 INJECTION, SOLUTION INTRAVENOUS EVERY 24 HOURS
Status: DISCONTINUED | OUTPATIENT
Start: 2020-08-10 | End: 2020-08-12 | Stop reason: HOSPADM

## 2020-08-10 RX ORDER — POLYETHYLENE GLYCOL 3350 17 G/17G
17 POWDER, FOR SOLUTION ORAL DAILY PRN
Status: DISCONTINUED | OUTPATIENT
Start: 2020-08-10 | End: 2020-08-10

## 2020-08-10 RX ORDER — SODIUM CHLORIDE 0.9 % (FLUSH) 0.9 %
10 SYRINGE (ML) INJECTION PRN
Status: DISCONTINUED | OUTPATIENT
Start: 2020-08-10 | End: 2020-08-12 | Stop reason: HOSPADM

## 2020-08-10 RX ORDER — ACETAMINOPHEN 325 MG/1
650 TABLET ORAL EVERY 6 HOURS PRN
Status: DISCONTINUED | OUTPATIENT
Start: 2020-08-10 | End: 2020-08-12 | Stop reason: HOSPADM

## 2020-08-10 RX ORDER — HEPARIN SODIUM 5000 [USP'U]/ML
5000 INJECTION, SOLUTION INTRAVENOUS; SUBCUTANEOUS EVERY 8 HOURS SCHEDULED
Status: DISCONTINUED | OUTPATIENT
Start: 2020-08-10 | End: 2020-08-12 | Stop reason: HOSPADM

## 2020-08-10 RX ORDER — FAMOTIDINE 20 MG/1
20 TABLET, FILM COATED ORAL DAILY
Status: DISCONTINUED | OUTPATIENT
Start: 2020-08-10 | End: 2020-08-12 | Stop reason: HOSPADM

## 2020-08-10 RX ORDER — SODIUM CHLORIDE 9 MG/ML
INJECTION, SOLUTION INTRAVENOUS CONTINUOUS
Status: DISCONTINUED | OUTPATIENT
Start: 2020-08-10 | End: 2020-08-10 | Stop reason: ALTCHOICE

## 2020-08-10 RX ORDER — PROMETHAZINE HYDROCHLORIDE 25 MG/1
12.5 TABLET ORAL EVERY 6 HOURS PRN
Status: DISCONTINUED | OUTPATIENT
Start: 2020-08-10 | End: 2020-08-12 | Stop reason: HOSPADM

## 2020-08-10 RX ORDER — DICYCLOMINE HYDROCHLORIDE 10 MG/ML
20 INJECTION INTRAMUSCULAR ONCE
Status: COMPLETED | OUTPATIENT
Start: 2020-08-10 | End: 2020-08-10

## 2020-08-10 RX ORDER — SODIUM CHLORIDE 9 MG/ML
INJECTION, SOLUTION INTRAVENOUS CONTINUOUS
Status: DISCONTINUED | OUTPATIENT
Start: 2020-08-10 | End: 2020-08-12 | Stop reason: HOSPADM

## 2020-08-10 RX ORDER — ONDANSETRON 2 MG/ML
4 INJECTION INTRAMUSCULAR; INTRAVENOUS ONCE
Status: COMPLETED | OUTPATIENT
Start: 2020-08-10 | End: 2020-08-10

## 2020-08-10 RX ORDER — LABETALOL HYDROCHLORIDE 5 MG/ML
10 INJECTION, SOLUTION INTRAVENOUS EVERY 4 HOURS PRN
Status: DISCONTINUED | OUTPATIENT
Start: 2020-08-10 | End: 2020-08-10 | Stop reason: CLARIF

## 2020-08-10 RX ORDER — ACETAMINOPHEN 650 MG/1
650 SUPPOSITORY RECTAL EVERY 6 HOURS PRN
Status: DISCONTINUED | OUTPATIENT
Start: 2020-08-10 | End: 2020-08-12 | Stop reason: HOSPADM

## 2020-08-10 RX ADMIN — ONDANSETRON 4 MG: 2 INJECTION INTRAMUSCULAR; INTRAVENOUS at 20:31

## 2020-08-10 RX ADMIN — METRONIDAZOLE 500 MG: 500 INJECTION, SOLUTION INTRAVENOUS at 07:53

## 2020-08-10 RX ADMIN — CEFTRIAXONE SODIUM 1 G: 1 INJECTION, POWDER, FOR SOLUTION INTRAMUSCULAR; INTRAVENOUS at 05:03

## 2020-08-10 RX ADMIN — CIPROFLOXACIN 400 MG: 2 INJECTION, SOLUTION INTRAVENOUS at 07:53

## 2020-08-10 RX ADMIN — HEPARIN SODIUM 5000 UNITS: 5000 INJECTION, SOLUTION INTRAVENOUS; SUBCUTANEOUS at 07:53

## 2020-08-10 RX ADMIN — ATORVASTATIN CALCIUM 40 MG: 40 TABLET, FILM COATED ORAL at 20:27

## 2020-08-10 RX ADMIN — SODIUM CHLORIDE, PRESERVATIVE FREE 10 ML: 5 INJECTION INTRAVENOUS at 20:27

## 2020-08-10 RX ADMIN — SODIUM CHLORIDE 1000 ML: 9 INJECTION, SOLUTION INTRAVENOUS at 02:43

## 2020-08-10 RX ADMIN — SODIUM CHLORIDE: 9 INJECTION, SOLUTION INTRAVENOUS at 15:50

## 2020-08-10 RX ADMIN — DICYCLOMINE HYDROCHLORIDE 20 MG: 20 INJECTION, SOLUTION INTRAMUSCULAR at 02:43

## 2020-08-10 RX ADMIN — SODIUM CHLORIDE: 9 INJECTION, SOLUTION INTRAVENOUS at 07:53

## 2020-08-10 RX ADMIN — FAMOTIDINE 20 MG: 20 TABLET ORAL at 07:53

## 2020-08-10 RX ADMIN — HEPARIN SODIUM 5000 UNITS: 5000 INJECTION, SOLUTION INTRAVENOUS; SUBCUTANEOUS at 14:39

## 2020-08-10 RX ADMIN — HEPARIN SODIUM 5000 UNITS: 5000 INJECTION, SOLUTION INTRAVENOUS; SUBCUTANEOUS at 20:27

## 2020-08-10 RX ADMIN — ONDANSETRON 4 MG: 2 INJECTION INTRAMUSCULAR; INTRAVENOUS at 02:43

## 2020-08-10 RX ADMIN — METRONIDAZOLE 500 MG: 500 INJECTION, SOLUTION INTRAVENOUS at 15:50

## 2020-08-10 RX ADMIN — SODIUM CHLORIDE: 9 INJECTION, SOLUTION INTRAVENOUS at 06:10

## 2020-08-10 RX ADMIN — METRONIDAZOLE 500 MG: 500 INJECTION, SOLUTION INTRAVENOUS at 23:18

## 2020-08-10 ASSESSMENT — PAIN DESCRIPTION - PAIN TYPE: TYPE: ACUTE PAIN

## 2020-08-10 ASSESSMENT — PAIN DESCRIPTION - LOCATION: LOCATION: ABDOMEN

## 2020-08-10 ASSESSMENT — PAIN SCALES - GENERAL
PAINLEVEL_OUTOF10: 0
PAINLEVEL_OUTOF10: 0

## 2020-08-10 NOTE — PROGRESS NOTES
2 person skin assessment completed by [facility] NEW[facility] and myself. Unremarkable.

## 2020-08-10 NOTE — H&P
HISTORY AND PHYSICAL  (Hospitalist, Internal Medicine)  IDENTIFYING INFORMATION   PATIENT:  Ignacio Ro  MRN:  [id number]  ADMIT DATE: 8/10/2020      CHIEF COMPLAINT   Diarrhea    HISTORY OF PRESENT ILLNESS   Ignacio Ro is a 75 y.o. female with history of colon cancer status post sigmoid resection(1/2019), DVT or left femoral vein 3/2019-off anticoagulation, CKD stage III, depression presented to ED with complaints of diarrhea. Patient reports that since that she had colectomy she has been having intermittent diarrhea. She reports loose watery stool 1 episode in between having regular bowel movements. Today she had 3 episodes of diarrhea. Also reported having suprapubic cramping type of abdominal pain, dull. Reported having blood after having bowel movements. She reports she noted the blood on the tissue. Patient denied taking any antibiotics recently. Denied any changes in her medications or diet. Denied any fever, chills, cough, chest pain, shortness of breath. At presentation patient was noted to have /71, , RR 18, temperature 98.8, saturating 97% on room air. Lab work significant for potassium 5, CO2 21, BUN 43, creatinine 2.7, random glucose 183, WBC 13.3, hemoglobin 11.8, UA-slightly cloudy, moderate blood, large leukocyte esterase, WBC 31, RBC 15, many bacteria. CT abdomen/pelvis - mucosal thickening and pericolonic inflammatory changes seen involving the descending colon and the sigmoid colon concerning for infectious/inflammatory colitis. Patient was given IV ceftriaxone for UTI. GI disease panel ordered. Patient is being admitted for further evaluation.     PAST MEDICAL HISTORY PAST SURGICAL HISTORY   history of colon cancer status post sigmoid resection(1/2019), DVT or left femoral vein 3/2019-off anticoagulation, CKD stage III, depression Cholecystectomy-2016, appendectomy, cholecystectomy, cervical spine fusion, hysterectomy   FAMILY HISTORY SOCIAL HISTORY Reviewed and noncontributory   never a smoker, denies any alcohol or illicit drug abuse   MEDICATIONS ALLERGIES    Reviewed medications with patient-famotidine 20 mg twice daily, triamterene/hydrochlorothiazide 37.5/25 mg daily, lisinopril 10 mg daily, atorvastatin 40 mg nightly, sertraline 50 mg daily  Neosporin       PAST MEDICAL, SURGICAL, FAMILY, and SOCIAL HISTORY         Past Medical History:   Diagnosis Date    Abnormal EKG      see ekg report-11/4/2016-\"have appt 11/18/2016 to see Dr Ranjit Bach to get heart clearance for surgery - 11/21/2016\"    Anemia     Chemotherapy induced diarrhea     Chronic kidney disease, stage 3 (HCC)     Colon Cancer Dx 1-8-19    Colonoscopy, tx surg- following with Dr Black to start chemo    DVT (deep vein thrombosis) in pregnancy     Essential hypertension     History of blood transfusion 1960's    No Reaction To Blood Transfusion Received    Hx of blood clots     left leg    Hx of colonic polyp     Hyperlipidemia     Hypertension     Impaired fasting glucose     Malignant neoplasm of colon (HCC)     PONV (postoperative nausea and vomiting)     denies hx of motion sickness    Shortness of breath on exertion     Teeth missing     Upper And Lower    UTI (urinary tract infection) In Past    No Current Symptoms    Wears dentures     Full Upper, Partial Lower    Wears glasses     Wears partial dentures     Lower     Past Surgical History:   Procedure Laterality Date    APPENDECTOMY  1960    BACK SURGERY  2012    ACF C4, C5 With Hardware    BLADDER SUSPENSION  2002    Done With Vaginal Hysterectomy    CHOLECYSTECTOMY, LAPAROSCOPIC  11/21/2016    COLONOSCOPY  7-17-13    Polyps x2, Internal hemorrhoids    COLONOSCOPY  01/08/2019    Ulcerated mass in sigmoid at 15 cm, Internal grade 1 hemorrhoids, otherwise normal to 40cm    COLONOSCOPY  10/17/2019    grade 1 int hem, s/p sigmoid resection, repeat in 3 years    COLONOSCOPY N/A 10/17/2019    COLORECTAL CANCER SCREENING, HIGH RISK performed by Jacinta Parker MD at [address] Left 2016    Broken Left Wrist With Hardware    HYSTERECTOMY VAGINAL  2002    Bladder Suspension Also Done    INSERTION / REMOVAL / REPLACEMENT VENOUS ACCESS CATHETER Right 2/20/2019    PORT INSERTION performed by Sophie Mccann MD at [address] 1/8/2019    [facility] with tattoo [facility] at [facility] N/A 1/16/2019    BOWEL RESECTION SIGMOID performed by Sophie Mccann MD at [facility]  Late 1970's    TUNNELED VENOUS PORT PLACEMENT  02/20/2019     Family History   Problem Relation Age of Onset    Diabetes Mother     Cancer Mother         \"Brain Cancer\"    Tuberculosis Father     Diabetes Sister     COPD Sister     Cancer Brother         Lung Cancer    Early Death Brother 61        Lung Cancer    Early Death Daughter 47        Lung Cancer    Cancer Daughter         Lung Cancer    Cancer Daughter         Breast Cancer    Breast Cancer Daughter     Early Death Daughter 52        Breast Cancer     Family Hx of HTN  Family Hx as reviewed above, otherwise non-contributory  Social History     Socioeconomic History    Marital status:      Spouse name: None    Number of children: None    Years of education: None    Highest education level: None   Occupational History    None   Social Needs    Financial resource strain: None    Food insecurity     Worry: None     Inability: None    Transportation needs     Medical: None     Non-medical: None   Tobacco Use    Smoking status: Never Smoker    Smokeless tobacco: Never Used   Substance and Sexual Activity    Alcohol use: No    Drug use: No    Sexual activity: Never   Lifestyle    Physical activity     Days per week: None     Minutes per session: None    Stress: None   Relationships    Social connections     Talks on phone: None Gets together: None     Attends Sikhism service: None     Active member of club or organization: None     Attends meetings of clubs or organizations: None     Relationship status: None    Intimate partner violence     Fear of current or ex partner: None     Emotionally abused: None     Physically abused: None     Forced sexual activity: None   Other Topics Concern    None   Social History Narrative    None       MEDICATIONS   Medications Prior to Admission  Not in a hospital admission. Current Medications  Current Facility-Administered Medications   Medication Dose Route Frequency Provider Last Rate Last Dose    0.9 % sodium chloride infusion   Intravenous Continuous Leannarichi Flores PA-C        cefTRIAXone (ROCEPHIN) 1 g IVPB in 50 mL D5W minibag  1 g Intravenous Once Cem Duffy PA-C         Current Outpatient Medications   Medication Sig Dispense Refill    Probiotic Product (PROBIOTIC-10 PO) Take 2 capsules by mouth      psyllium (KONSYL) 28.3 % PACK Take 1 packet by mouth daily      famotidine (PEPCID) 20 MG tablet TAKE 1 TABLET BY MOUTH TWICE DAILY 180 tablet 1    ondansetron (ZOFRAN) 8 MG tablet TK 1 T PO Q 8 H PRF NAUSEA OR VOM 20 tablet 3    triamterene-hydroCHLOROthiazide (MAXZIDE-25) 37.5-25 MG per tablet Take 1 tablet by mouth every morning 90 tablet 1    lisinopril (PRINIVIL;ZESTRIL) 10 MG tablet TAKE 1 TABLET BY MOUTH EVERY DAY 90 tablet 1    atorvastatin (LIPITOR) 40 MG tablet Take 1 tablet by mouth nightly 90 tablet 1    sertraline (ZOLOFT) 50 MG tablet Take 1 tablet by mouth every morning 90 tablet 1    Acetaminophen (TYLENOL EX ST ARTHRITIS PAIN PO) Take by mouth daily as needed TAKE PRN      magnesium oxide (MAG-OX) 400 MG tablet Take 400 mg by mouth 2 times daily      IRON PO Take by mouth 2 times daily Over The Counter           Allergies  Allergies   Allergen Reactions    Neosporin [Neomycin-Polymyx-Gramicid] Swelling       REVIEW OF SYSTEMS   Within above limitations. 14 point review of systems reviewed. Pertinent positive or negative as per HPI or otherwise negative per 14 point systems review. PHYSICAL EXAM     Wt Readings from Last 3 Encounters:   08/09/20 196 lb (88.9 kg)   07/21/20 194 lb (88 kg)   06/03/20 186 lb 15.2 oz (84.8 kg)       Blood pressure (!) 126/55, pulse 105, temperature 98.8 °F (37.1 °C), temperature source Oral, resp. rate 18, height 5' (1.524 m), weight 196 lb (88.9 kg), SpO2 92 %, not currently breastfeeding. GEN  -Awake, alert, NAD.   EYES   -PERRL. HENT  -MM are moist.   RESP  -LS CTA equal bilat, no wheezes, rales or rhonchi. Symmetric chest movement. No respiratory distress noted. C/V  -S1/S2 auscultated, tachycardia without appreciable M/R/G. mild peripheral edema. GI  -Abdomen is soft, non-distended, no significant tenderness. No masses or guarding. + BS in all quadrants. Rectal exam deferred.   -No CVA tenderness. Steele catheter is not present. MS  -B/L extremities - No gross joint deformities. No swelling, intact sensation symmetrical.   SKIN  -Normal coloration, warm, dry. NEURO  -CN 2-12 appear grossly intact, normal speech, no lateralizing weakness. PSYC  -Awake, alert, oriented x 4. Appropriate affect. LABS AND IMAGING     Results for Jossie Del Real (MRN [id number]) as of 8/10/2020 05:30   Ref.  Range 8/10/2020 00:25   Sodium Latest Ref Range: 135 - 145 MMOL/L 136   Potassium Latest Ref Range: 3.5 - 5.1 MMOL/L 5.0   Chloride Latest Ref Range: 99 - 110 mMol/L 102   CO2 Latest Ref Range: 21 - 32 MMOL/L 21   BUN Latest Ref Range: 6 - 23 MG/DL 43 (H)   Creatinine Latest Ref Range: 0.6 - 1.1 MG/DL 2.7 (H)   Anion Gap Latest Ref Range: 4 - 16  13   GFR Non- Latest Ref Range: >60 mL/min/1.73m2 17 (L)   GFR  Latest Ref Range: >60 mL/min/1.73m2 21 (L)   Glucose Latest Ref Range: 70 - 99 MG/ (H)   Calcium Latest Ref Range: 8.3 - 10.6 MG/DL 10.0   Total Protein Latest Ref Range: 6.4 - 8.2 GM/DL 7.2   Albumin Latest Ref Range: 3.4 - 5.0 GM/DL 4.1   Alk Phos Latest Ref Range: 40 - 129 IU/L 112   ALT Latest Ref Range: 10 - 40 U/L 14   AST Latest Ref Range: 15 - 37 IU/L 18   Bilirubin Latest Ref Range: 0.0 - 1.0 MG/DL 0.3   Lipase Latest Ref Range: 13 - 60 IU/L 27   WBC Latest Ref Range: 4.0 - 10.5 K/CU MM 13.3 (H)   RBC Latest Ref Range: 4.2 - 5.4 M/CU MM 4.55   Hemoglobin Quant Latest Ref Range: 12.5 - 16.0 GM/DL 11.8 (L)   Hematocrit Latest Ref Range: 37 - 47 % 37.8   MCV Latest Ref Range: 78 - 100 FL 83.1   MCH Latest Ref Range: 27 - 31 PG 25.9 (L)   MCHC Latest Ref Range: 32.0 - 36.0 % 31.2 (L)   MPV Latest Ref Range: 7.5 - 11.1 FL 10.3   RDW Latest Ref Range: 11.7 - 14.9 % 13.6   Platelet Count Latest Ref Range: 140 - 440 K/CU    Lymphocyte % Latest Ref Range: 24 - 44 % 4.9 (L)   Monocytes % Latest Ref Range: 0 - 4 % 4.7 (H)   Eosinophils % Latest Ref Range: 0 - 3 % 0.0   Basophils % Latest Ref Range: 0 - 1 % 0.3   Lymphocytes Absolute Latest Units: K/CU MM 0.7   Monocytes Absolute Latest Units: K/CU MM 0.6   Eosinophils Absolute Latest Units: K/CU MM 0.0   Basophils Absolute Latest Units: K/CU MM 0.0   Differential Type Unknown AUTOMATED DIFFERENTIAL   Segs Relative Latest Ref Range: 36 - 66 % 89.6 (H)   Segs Absolute Latest Units: K/CU MM 11.9   Nucleated RBC % Latest Units: % 0.0   Immature Neutrophil % Latest Ref Range: 0 - 0.43 % 0.5 (H)   Total Immature Neutrophil Latest Units: K/CU MM 0.06   Total Nucleated RBC Latest Units: K/CU MM 0.0     Results for Areli Cao (MRN [id number]) as of 8/10/2020 05:30   Ref.  Range 8/10/2020 04:00   Color, UA Latest Ref Range: YELLOW  YELLOW   Clarity, UA Latest Ref Range: CLEAR  SLIGHTLY CLOUDY (A)   Bilirubin, Urine Latest Ref Range: NEGATIVE MG/DL NEGATIVE   Ketones, Urine Latest Ref Range: NEGATIVE MG/DL NEGATIVE   Specific Gravity, UA Latest Ref Range: 1.001 - 1.035  1.015   Blood, Urine Latest Ref Range: NEGATIVE  MODERATE (A)   Protein, UA Latest Ref Range: NEGATIVE MG/DL NEGATIVE   Urobilinogen, Urine Latest Ref Range: 0.2 - 1.0 MG/DL NORMAL   Leukocyte Esterase, Urine Latest Ref Range: NEGATIVE  LARGE (A)   Glucose, Urine Latest Ref Range: NEGATIVE MG/DL NEGATIVE   Nitrite Urine, Quantitative Latest Ref Range: NEGATIVE  NEGATIVE   pH, Urine Latest Ref Range: 5.0 - 8.0  5.0   Hyaline Casts, UA Latest Units: /LPF 5   Mucus, UA Latest Ref Range: NEGATIVE HPF RARE (A)   WBC, UA Latest Ref Range: 0 - 5 /HPF 31 (H)   RBC, UA Latest Ref Range: 0 - 6 /HPF 15 (H)   Squam Epithel, UA Latest Units: /HPF 4   Bacteria, UA Latest Ref Range: NEGATIVE /HPF MANY (A)   Trichomonas, UA Latest Ref Range: NONE SEEN /HPF NONE SEEN     Recent Imaging    CT ABDOMEN PELVIS WO CONTRAST Additional Contrast? None [[id number]]  Collected: 08/10/20 0241        Order Status: Completed  Updated: 08/10/20 0248       Impression:         Mucosal thickening and pericolonic inflammatory changes seen involving the   descending colon and the sigmoid colon, concerning for   infectious/inflammatory colitis. Large hiatal hernia. Relevant labs and imaging reviewed    ASSESSMENT AND PLAN     # Diarrhea:  -CT abd/pelvis-  mucosal thickening and pericolonic inflammatory changes seen involving the descending colon and the sigmoid colon concerning for infectious/inflammatory colitis. -C. difficile, GI disease panel ordered.   -Consult Dr. Eboni Jenkins    #.  UTI:  -Urine culture ordered from ER  -Patient received a dose of ceftriaxone  -Continue IV ciprofloxacin    #. DIMAS on CKD:  -Creatinine 2.7 today, was 2-7/17/2020   -Continue IV fluids   -Avoid nephrotoxic agents  -Hold lisinopril, triamterene/hydrochlorothiazide. #.  Hypertension: Patient is on lisinopril, triamterene/hydrochlorothiazide-hold due to DIMAS on CKD. -Labetalol PRN for systolic blood pressure more than 160.     #. history of colon cancer status post sigmoid resection (1/2019)  -Recent colonoscopy-10/2019    #. H/o , DVT or left femoral vein 3/2019-off anticoagulation    #. CKD stage III    #. Depression-continue Zoloft     #. Hyperlipidemia : Continue atorvastatin     DVT Prophylaxis: Heparin   GI Prophylaxis: Pepcid   code Status: FULL.       Case d/w ED physician    James Nathan MD  Hospitalist, Internal Medicine  8/10/2020 at 4:58 AM

## 2020-08-10 NOTE — PLAN OF CARE
Seen examined this morning. Admitted because of diarrhea. CT of the abdomen showed colitis on the descending colon and sigmoid colon. Also with urinary tract infection. She was started on ciprofloxacin and Flagyl. Vitals:    08/10/20 1330   BP: 138/61   Pulse: 88   Resp: 16   Temp: 98 °F (36.7 °C)   SpO2: 95%     Regular rate and rhythm  Chest clear to auscultation  Abdomen soft, tenderness on the left side, with bowel sounds  Lower extremity with no edema    Assessment and plan    Colitis could be infectious: Started on ciprofloxacin and Flagyl. GI disease panel and C. difficile. Urinary tract infection: Continue ciprofloxacin for now. Urine culture pending  Acute kidney injury on chronic kidney disease: Creatinine 2.7 on admission. Baseline 2.0. Continue IV fluids for now.     Enma Lopez MD

## 2020-08-10 NOTE — ED NOTES
Bed: ED-31  Expected date:   Expected time:   Means of arrival:   Comments:  ELKE. Needs bon.  CURTIS left @[id number]     Kailey Garcia  08/10/20 0016

## 2020-08-10 NOTE — ED PROVIDER NOTES
Emergency 3130  27[facility] EMERGENCY DEPARTMENT    Patient: Meryle Rosette  MRN: [id number]  : 1945  Date of Evaluation: 2020  ED Provider: Chen Werner PA-C    Chief Complaint       Chief Complaint   Patient presents with    Abdominal Pain    Emesis    Hemorrhoids       HOPI Meryle Rosette is a 75 y.o. female who presents to the emergency department for nausea, vomiting, diarrhea. Patient states symptoms began today. She reports 2 episodes of vomiting. She has had chronic diarrhea since colon resection last year but states it has been \"explosive\" today and has caused her hemorrhoids to bleed. She reports some lower abdominal cramping. Denies fever, chills, cp, sob. Denies urinary symptoms. She denies any recent ABX, travel, hospitalizations. ROS     CONSTITUTIONAL:  Denies fever. EYES:  Denies visual changes. HEAD:  Denies headache. ENT:  Denies earache, nasal congestion, sore throat. NECK:  Denies neck pain. RESPIRATORY:  Denies any shortness of breath. CARDIOVASCULAR:  Denies chest pain. GI:  + abd pain, n/v, diarrhea. :  Denies urinary symptoms. MUSCULOSKELETAL:  Denies extremity pain or swelling. BACK:  Denies back pain. INTEGUMENT:  Denies skin changes. LYMPHATIC:  Denies lymphadenopathy. NEUROLOGIC:  Denies any numbness/tingling. PSYCHIATRIC:  Denies SI/HI.     Past History     Past Medical History:   Diagnosis Date    Abnormal EKG      see ekg report-2016-\"have appt 2016 to see Dr Parisa Cabrera to get heart clearance for surgery - 2016\"    Anemia     Chemotherapy induced diarrhea     Chronic kidney disease, stage 3 (HCC)     Colon Cancer Dx 19    Colonoscopy, tx surg- following with Dr Black to start chemo    DVT (deep vein thrombosis) in pregnancy     Essential hypertension     History of blood transfusion 's    No Reaction To Blood Transfusion Received    Hx of blood clots left leg    Hx of colonic polyp     Hyperlipidemia     Hypertension     Impaired fasting glucose     Malignant neoplasm of colon (HCC)     PONV (postoperative nausea and vomiting)     denies hx of motion sickness    Shortness of breath on exertion     Teeth missing     Upper And Lower    UTI (urinary tract infection) In Past    No Current Symptoms    Wears dentures     Full Upper, Partial Lower    Wears glasses     Wears partial dentures     Lower     Past Surgical History:   Procedure Laterality Date    APPENDECTOMY  1960    BACK SURGERY  2012    ACF C4, C5 With Hardware    BLADDER SUSPENSION  2002    Done With Vaginal Hysterectomy    CHOLECYSTECTOMY, LAPAROSCOPIC  11/21/2016    COLONOSCOPY  7-17-13    Polyps x2, Internal hemorrhoids    COLONOSCOPY  01/08/2019    Ulcerated mass in sigmoid at 15 cm, Internal grade 1 hemorrhoids, otherwise normal to 40cm    COLONOSCOPY  10/17/2019    grade 1 int hem, s/p sigmoid resection, repeat in 3 years    COLONOSCOPY N/A 10/17/2019    COLORECTAL CANCER SCREENING, HIGH RISK performed by Valeria Myers MD at [address] Left 2016    Broken Left Wrist With Hardware    HYSTERECTOMY VAGINAL  2002    Bladder Suspension Also Done    INSERTION / REMOVAL / REPLACEMENT VENOUS ACCESS CATHETER Right 2/20/2019    PORT INSERTION performed by Madhu Fragoso MD at [facility] N/A 1/8/2019    [facility] with tattoo performed by Valeria Myers MD at [place] [address] N/A 1/16/2019    BOWEL RESECTION SIGMOID performed by Madhu Fragoso MD at [facility]  Late 1970's    TUNNELED VENOUS PORT PLACEMENT  02/20/2019     Social History     Socioeconomic History    Marital status:      Spouse name: None    Number of children: None    Years of education: None    Highest education level: None   Occupational History    None   Social Needs    Financial resource strain: None    Food insecurity     Worry: None     Inability: None    Transportation needs     Medical: None     Non-medical: None   Tobacco Use    Smoking status: Never Smoker    Smokeless tobacco: Never Used   Substance and Sexual Activity    Alcohol use: No    Drug use: No    Sexual activity: Never   Lifestyle    Physical activity     Days per week: None     Minutes per session: None    Stress: None   Relationships    Social connections     Talks on phone: None     Gets together: None     Attends Confucianist service: None     Active member of club or organization: None     Attends meetings of clubs or organizations: None     Relationship status: None    Intimate partner violence     Fear of current or ex partner: None     Emotionally abused: None     Physically abused: None     Forced sexual activity: None   Other Topics Concern    None   Social History Narrative    None       Medications/Allergies     Previous Medications    ACETAMINOPHEN (TYLENOL EX ST ARTHRITIS PAIN PO)    Take by mouth daily as needed TAKE PRN    ATORVASTATIN (LIPITOR) 40 MG TABLET    Take 1 tablet by mouth nightly    FAMOTIDINE (PEPCID) 20 MG TABLET    TAKE 1 TABLET BY MOUTH TWICE DAILY    IRON PO    Take by mouth 2 times daily Over The Counter    LISINOPRIL (PRINIVIL;ZESTRIL) 10 MG TABLET    TAKE 1 TABLET BY MOUTH EVERY DAY    MAGNESIUM OXIDE (MAG-OX) 400 MG TABLET    Take 400 mg by mouth 2 times daily    ONDANSETRON (ZOFRAN) 8 MG TABLET    TK 1 T PO Q 8 H PRF NAUSEA OR VOM    PROBIOTIC PRODUCT (PROBIOTIC-10 PO)    Take 2 capsules by mouth    PSYLLIUM (KONSYL) 28.3 % PACK    Take 1 packet by mouth daily    SERTRALINE (ZOLOFT) 50 MG TABLET    Take 1 tablet by mouth every morning    TRIAMTERENE-HYDROCHLOROTHIAZIDE (MAXZIDE-25) 37.5-25 MG PER TABLET    Take 1 tablet by mouth every morning     Allergies   Allergen Reactions    Neosporin [Neomycin-Polymyx-Gramicid] Swelling        Physical Exam       ED Triage Vitals [08/09/20 2313] BP Temp Temp Source Pulse Resp SpO2 Height Weight   127/71 98.8 °F (37.1 °C) Oral 105 18 97 % 5' (1.524 m) 196 lb (88.9 kg)     GENERAL APPEARANCE:  Well-developed, well-nourished, no acute distress. HEAD:  NC/AT. EYES:  Sclera anicteric. ENT:  Ears, nose, mouth normal.     NECK:  Supple. CARDIO:  RRR. LUNGS:   CTAB. Respirations unlabored. ABDOMEN:  Soft, non-distended, minimal lower abdominal tenderness, no rebound, no guarding. BS active. EXTREMITIES:  No acute deformities. SKIN:  Warm and dry. NEUROLOGICAL:  Alert and oriented. PSYCHIATRIC:  Normal mood.      Diagnostics     Labs:  Results for orders placed or performed during the hospital encounter of 08/10/20   CBC Auto Differential   Result Value Ref Range    WBC 13.3 (H) 4.0 - 10.5 K/CU MM    RBC 4.55 4.2 - 5.4 M/CU MM    Hemoglobin 11.8 (L) 12.5 - 16.0 GM/DL    Hematocrit 37.8 37 - 47 %    MCV 83.1 78 - 100 FL    MCH 25.9 (L) 27 - 31 PG    MCHC 31.2 (L) 32.0 - 36.0 %    RDW 13.6 11.7 - 14.9 %    Platelets 713 435 - 121 K/CU MM    MPV 10.3 7.5 - 11.1 FL    Differential Type AUTOMATED DIFFERENTIAL     Segs Relative 89.6 (H) 36 - 66 %    Lymphocytes % 4.9 (L) 24 - 44 %    Monocytes % 4.7 (H) 0 - 4 %    Eosinophils % 0.0 0 - 3 %    Basophils % 0.3 0 - 1 %    Segs Absolute 11.9 K/CU MM    Lymphocytes Absolute 0.7 K/CU MM    Monocytes Absolute 0.6 K/CU MM    Eosinophils Absolute 0.0 K/CU MM    Basophils Absolute 0.0 K/CU MM    Nucleated RBC % 0.0 %    Total Nucleated RBC 0.0 K/CU MM    Total Immature Neutrophil 0.06 K/CU MM    Immature Neutrophil % 0.5 (H) 0 - 0.43 %   Comprehensive Metabolic Panel w/ Reflex to MG   Result Value Ref Range    Sodium 136 135 - 145 MMOL/L    Potassium 5.0 3.5 - 5.1 MMOL/L    Chloride 102 99 - 110 mMol/L    CO2 21 21 - 32 MMOL/L    BUN 43 (H) 6 - 23 MG/DL    CREATININE 2.7 (H) 0.6 - 1.1 MG/DL    Glucose 183 (H) 70 - 99 MG/DL    Calcium 10.0 8.3 - 10.6 MG/DL    Alb 4.1 3.4 - 5.0 GM/DL    Total Protein 7.2 6.4 - 8.2 GM/DL    Total Bilirubin 0.3 0.0 - 1.0 MG/DL    ALT 14 10 - 40 U/L    AST 18 15 - 37 IU/L    Alkaline Phosphatase 112 40 - 129 IU/L    GFR Non- 17 (L) >60 mL/min/1.73m2    GFR  21 (L) >60 mL/min/1.73m2    Anion Gap 13 4 - 16   Lipase   Result Value Ref Range    Lipase 27 13 - 60 IU/L   Lactic Acid, Plasma   Result Value Ref Range    Lactate 1.2 0.4 - 2.0 mMOL/L     Radiographs:  Ct Abdomen Pelvis Wo Contrast Additional Contrast? None    Result Date: 8/10/2020  Mucosal thickening and pericolonic inflammatory changes seen involving the descending colon and the sigmoid colon, concerning for infectious/inflammatory colitis. Large hiatal hernia. ED Course and MDM   -  Patient seen and evaluated in the emergency department. -  Triage and nursing notes reviewed and incorporated. -  Old chart records reviewed and incorporated. -  Supervising physician was Dr. Gay De La Garza. Patient was seen independently. -  Work-up included:  See above  -  ED medications:  NS, Rocephin, Bentyl, Zofran  -  Results discussed with patient. CT shows colitis. White count is 13,300. Lactic normal.  Renal function worse than baseline with Cr 2.7, GFR 17.  UA with large leuks, 15 RBCs, 31 WBCs, many bacteria. Will culture and treat. Fluids started for DIMAS. Upon chart review, it appears they were unable to run CDiff last week on stool studies. She does not have risk for CDiff, however, requested stool specimen for repeat GI PCR. I spoke with Dr. Alisson Marcelo, hospitalist, who will admit for further management. In light of current events, I did utilize appropriate PPE (including surgical face mask, safety glasses, and gloves, as recommended by the health facility/national standard best practice, during my bedside interactions with the patient. Patient was also masked throughout ED course. Final Impression      1. Colitis    2. Urinary tract infection without hematuria, site unspecified    3.  Acute

## 2020-08-11 ENCOUNTER — ANESTHESIA EVENT (OUTPATIENT)
Dept: ENDOSCOPY | Age: 75
DRG: 392 | End: 2020-08-11
Payer: MEDICARE

## 2020-08-11 LAB
ANION GAP SERPL CALCULATED.3IONS-SCNC: 6 MMOL/L (ref 4–16)
BASOPHILS ABSOLUTE: 0 K/CU MM
BASOPHILS RELATIVE PERCENT: 0.2 % (ref 0–1)
BUN BLDV-MCNC: 27 MG/DL (ref 6–23)
CALCIUM SERPL-MCNC: 9.2 MG/DL (ref 8.3–10.6)
CHLORIDE BLD-SCNC: 110 MMOL/L (ref 99–110)
CLOSTRIDIUM DIFFICILE, PCR: NORMAL
CO2: 23 MMOL/L (ref 21–32)
CREAT SERPL-MCNC: 1.8 MG/DL (ref 0.6–1.1)
DIFFERENTIAL TYPE: ABNORMAL
EOSINOPHILS ABSOLUTE: 0 K/CU MM
EOSINOPHILS RELATIVE PERCENT: 0.5 % (ref 0–3)
GFR AFRICAN AMERICAN: 33 ML/MIN/1.73M2
GFR NON-AFRICAN AMERICAN: 27 ML/MIN/1.73M2
GLUCOSE BLD-MCNC: 117 MG/DL (ref 70–99)
HCT VFR BLD CALC: 32.3 % (ref 37–47)
HEMOGLOBIN: 9.6 GM/DL (ref 12.5–16)
IMMATURE NEUTROPHIL %: 0.2 % (ref 0–0.43)
LYMPHOCYTES ABSOLUTE: 1.2 K/CU MM
LYMPHOCYTES RELATIVE PERCENT: 14.7 % (ref 24–44)
MCH RBC QN AUTO: 26.2 PG (ref 27–31)
MCHC RBC AUTO-ENTMCNC: 29.7 % (ref 32–36)
MCV RBC AUTO: 88 FL (ref 78–100)
MONOCYTES ABSOLUTE: 0.6 K/CU MM
MONOCYTES RELATIVE PERCENT: 6.8 % (ref 0–4)
NUCLEATED RBC %: 0 %
PDW BLD-RTO: 13.7 % (ref 11.7–14.9)
PLATELET # BLD: 171 K/CU MM (ref 140–440)
PMV BLD AUTO: 10.5 FL (ref 7.5–11.1)
POTASSIUM SERPL-SCNC: 4.6 MMOL/L (ref 3.5–5.1)
RBC # BLD: 3.67 M/CU MM (ref 4.2–5.4)
SEGMENTED NEUTROPHILS ABSOLUTE COUNT: 6.5 K/CU MM
SEGMENTED NEUTROPHILS RELATIVE PERCENT: 77.6 % (ref 36–66)
SODIUM BLD-SCNC: 139 MMOL/L (ref 135–145)
TOTAL IMMATURE NEUTOROPHIL: 0.02 K/CU MM
TOTAL NUCLEATED RBC: 0 K/CU MM
WBC # BLD: 8.4 K/CU MM (ref 4–10.5)

## 2020-08-11 PROCEDURE — 36415 COLL VENOUS BLD VENIPUNCTURE: CPT

## 2020-08-11 PROCEDURE — 94761 N-INVAS EAR/PLS OXIMETRY MLT: CPT

## 2020-08-11 PROCEDURE — 2500000003 HC RX 250 WO HCPCS: Performed by: INTERNAL MEDICINE

## 2020-08-11 PROCEDURE — 6360000002 HC RX W HCPCS: Performed by: INTERNAL MEDICINE

## 2020-08-11 PROCEDURE — 6370000000 HC RX 637 (ALT 250 FOR IP): Performed by: INTERNAL MEDICINE

## 2020-08-11 PROCEDURE — 85025 COMPLETE CBC W/AUTO DIFF WBC: CPT

## 2020-08-11 PROCEDURE — 80048 BASIC METABOLIC PNL TOTAL CA: CPT

## 2020-08-11 PROCEDURE — 1200000000 HC SEMI PRIVATE

## 2020-08-11 PROCEDURE — 2580000003 HC RX 258: Performed by: INTERNAL MEDICINE

## 2020-08-11 RX ADMIN — SODIUM CHLORIDE, PRESERVATIVE FREE 10 ML: 5 INJECTION INTRAVENOUS at 21:14

## 2020-08-11 RX ADMIN — METRONIDAZOLE 500 MG: 500 INJECTION, SOLUTION INTRAVENOUS at 17:26

## 2020-08-11 RX ADMIN — SERTRALINE HYDROCHLORIDE 50 MG: 50 TABLET ORAL at 08:31

## 2020-08-11 RX ADMIN — SODIUM CHLORIDE: 9 INJECTION, SOLUTION INTRAVENOUS at 05:19

## 2020-08-11 RX ADMIN — HEPARIN SODIUM 5000 UNITS: 5000 INJECTION, SOLUTION INTRAVENOUS; SUBCUTANEOUS at 17:25

## 2020-08-11 RX ADMIN — ACETAMINOPHEN 650 MG: 325 TABLET ORAL at 17:25

## 2020-08-11 RX ADMIN — FAMOTIDINE 20 MG: 20 TABLET ORAL at 08:31

## 2020-08-11 RX ADMIN — METRONIDAZOLE 500 MG: 500 INJECTION, SOLUTION INTRAVENOUS at 08:30

## 2020-08-11 RX ADMIN — HEPARIN SODIUM 5000 UNITS: 5000 INJECTION, SOLUTION INTRAVENOUS; SUBCUTANEOUS at 20:48

## 2020-08-11 RX ADMIN — CIPROFLOXACIN 400 MG: 2 INJECTION, SOLUTION INTRAVENOUS at 12:29

## 2020-08-11 RX ADMIN — METRONIDAZOLE 500 MG: 500 INJECTION, SOLUTION INTRAVENOUS at 23:23

## 2020-08-11 RX ADMIN — ATORVASTATIN CALCIUM 40 MG: 40 TABLET, FILM COATED ORAL at 20:48

## 2020-08-11 ASSESSMENT — PAIN DESCRIPTION - PAIN TYPE: TYPE: CHRONIC PAIN

## 2020-08-11 ASSESSMENT — PAIN DESCRIPTION - LOCATION: LOCATION: HEAD

## 2020-08-11 ASSESSMENT — PAIN SCALES - GENERAL
PAINLEVEL_OUTOF10: 3
PAINLEVEL_OUTOF10: 0

## 2020-08-11 NOTE — PROGRESS NOTES
Hospitalist Progress Note      Name:  Meryle Rosette /Age/Sex: 1945  (75 y.o. female)   MRN & CSN:  [id number] & [id number] Admission Date/Time: 8/10/2020 12:16 AM   Location:  [address] [place] [address] PCP: Rita Gottron, [address] Day: 2    Assessment and Plan:   Meryle Rosette is a 75 y.o.  female  who presents with:     Acute on chronic left sided Colitis with diarrhea, presumed related to her colectomy in . Likely ischemic but infectious causes being ruled out. · GI on board - pending C diff. Sigmoidoscopy in a.m.   Hx colon cancer s/p sigmoid resection (2019)   CKD 3, stable      Diet DIET CLEAR LIQUID;  Diet NPO, After Midnight   DVT Prophylaxis [] Lovenox, []  Heparin, [] SCDs, []No VTE prophylaxis, patient ambulating   GI Prophylaxis [] PPI, [] H2 Blocker, [] No GI prophylaxis, patient is receiving diet/Tube Feeds   Code Status Full Code   Disposition Patient requires continued admission due to pending sigmoidoscopy   MDM [] Low, [x] Moderate,[]  High  Patient's risk as above due to     [] One or more chronic illnesses with mild exacerbation progression    [] Two or more stable chronic illnesses    [x] Undiagnosed new problem with uncertain prognosis    [] Elective major surgery    []Prescription drug management     History of Present Illness:     Pt S&E. Talked with her and son who was at bedside  Discussed pending sigmoidoscopy   She admits to some nausea and continued diarrhea     10-14 point ROS reviewed negative, unless as noted above    Objective:   No intake or output data in the 24 hours ending 20 0852   Vitals:   Vitals:    20 0755   BP: (!) 114/58   Pulse: 89   Resp: 16   Temp: 97.9 °F (36.6 °C)   SpO2: 91%     Physical Exam:    GEN Awake female, laying in bed in no apparent distress. Appears given age. EYES Pupils are equally round.   No scleral discharge  HENT Atraumatic and symmetric head  NECK No apparent thyromegaly  RESP Symmetric chest movement while on room air. CARDIO/VASC Peripheral pulses equal bilaterally and palpable. No peripheral edema. GI Abdomen is not distended. Rectal exam deferred.  Steele catheter is not present. HEME/LYMPH No petechiae or ecchymoses. MSK Spontaneous movement of BL upper extremities  SKIN Normal coloration, warm, dry. NEURO Cranial nerves appear grossly intact  PSYCH Awake, alert.     Medications:   Medications:    sodium chloride flush  10 mL Intravenous 2 times per day    heparin (porcine)  5,000 Units Subcutaneous 3 times per day    ciprofloxacin  400 mg Intravenous Q24H    metroNIDAZOLE  500 mg Intravenous Q8H    atorvastatin  40 mg Oral Nightly    famotidine  20 mg Oral Daily    sertraline  50 mg Oral QAM      Infusions:    sodium chloride 100 mL/hr at 08/11/20 0519     PRN Meds: sodium chloride flush, 10 mL, PRN  acetaminophen, 650 mg, Q6H PRN    Or  acetaminophen, 650 mg, Q6H PRN  promethazine, 12.5 mg, Q6H PRN    Or  ondansetron, 4 mg, Q6H PRN  labetalol (TRANDATE) IVPB, 10 mg, Q4H PRN          Electronically signed by Pili Chan DO on 8/11/2020 at 8:52 AM

## 2020-08-11 NOTE — ANESTHESIA PRE PROCEDURE
Department of Anesthesiology  Preprocedure Note       Name:  Sherry Mata   Age:  75 y.o.  :  1945                                          MRN:  [id number]         Date:  2020      Surgeon: Aby Gordon):  Radha Rodas MD    Procedure: Procedure(s):  SIGMOIDOSCOPY DIAGNOSTIC FLEXIBLE    Medications prior to admission:   Prior to Admission medications    Medication Sig Start Date End Date Taking?  Authorizing Provider   famotidine (PEPCID) 20 MG tablet TAKE 1 TABLET BY MOUTH TWICE DAILY 20   Kendra Aragon MD   ondansetron (ZOFRAN) 8 MG tablet TK 1 T PO Q 8 H PRF NAUSEA OR VOM 20   Kendra Aragon MD   triamterene-hydroCHLOROthiazide [facility]) 37.5-25 MG per tablet Take 1 tablet by mouth every morning 20   Kendra Aragon MD   lisinopril (PRINIVIL;ZESTRIL) 10 MG tablet TAKE 1 TABLET BY MOUTH EVERY DAY 20   Kendra Aragon MD   atorvastatin (LIPITOR) 40 MG tablet Take 1 tablet by mouth nightly 20   Kendra Aragon MD   sertraline (ZOLOFT) 50 MG tablet Take 1 tablet by mouth every morning 20   Kendra Aragon MD   Acetaminophen (TYLENOL EX ST ARTHRITIS PAIN PO) Take by mouth daily as needed TAKE PRN    Historical Provider, MD       Current medications:    Current Facility-Administered Medications   Medication Dose Route Frequency Provider Last Rate Last Dose    sodium chloride flush 0.9 % injection 10 mL  10 mL Intravenous 2 times per day Alex Johnson MD   10 mL at 08/10/20 2027    sodium chloride flush 0.9 % injection 10 mL  10 mL Intravenous PRN Alex Johnson MD        acetaminophen (TYLENOL) tablet 650 mg  650 mg Oral Q6H PRN Alex Johnson MD        Or   [facility] acetaminophen (TYLENOL) suppository 650 mg  650 mg Rectal Q6H PRN Alex Johnson MD        promethazine (PHENERGAN) tablet 12.5 mg  12.5 mg Oral Q6H PRN Alex Johnson MD        Or    ondansetron (ZOFRAN) injection 4 mg  4 mg Intravenous Q6H PRN Alhaji MD Kavon   4 mg at 08/10/20 2031    0.9 % sodium chloride infusion   Intravenous Continuous Kalee Vidal  mL/hr at 08/11/20 0519      heparin (porcine) injection 5,000 Units  5,000 Units Subcutaneous 3 times per day Kalee Vidal MD   5,000 Units at 08/10/20 2027    ciprofloxacin (CIPRO) IVPB 400 mg  400 mg Intravenous Q24H Kalee Vidal MD   Stopped at 08/10/20 1000    metronidazole (FLAGYL) 500 mg in NaCl 100 mL IVPB premix  500 mg Intravenous Q8H Suchitha MD Kavon 100 mL/hr at 08/11/20 0830 500 mg at 08/11/20 0830    atorvastatin (LIPITOR) tablet 40 mg  40 mg Oral Nightly Kalee Vidal MD   40 mg at 08/10/20 2027    famotidine (PEPCID) tablet 20 mg  20 mg Oral Daily Kalee Vidal MD   20 mg at 08/11/20 0831    sertraline (ZOLOFT) tablet 50 mg  50 mg Oral QAM Kalee Vidal MD   50 mg at 08/11/20 0831    labetalol (NORMODYNE;TRANDATE) 10 mg in sodium chloride 0.9 % 50 mL IVPB  10 mg Intravenous Q4H PRN Kalee Vidal MD           Allergies:     Allergies   Allergen Reactions    Neosporin [Neomycin-Polymyx-Gramicid] Swelling       Problem List:    Patient Active Problem List   Diagnosis Code    [facility] C18.7    Essential hypertension I10    Hypertension I10    Impaired fasting glucose R73.01    Chronic kidney disease, stage 3 (HCC) N18.3    Malignant neoplasm of colon (HCC) C18.9    Iron deficiency E61.1    Major depressive disorder with single episode, in full remission ([facility] F32.5    Diarrhea R19.7       Past Medical History:        Diagnosis Date    Abnormal EKG      see ekg report-11/4/2016-\"have appt 11/18/2016 to see Dr Rose Evans to get heart clearance for surgery - 11/21/2016\"    Anemia     Chemotherapy induced diarrhea     Chronic kidney disease, stage 3 (HCC)     Colon Cancer Dx 1-8-19    Colonoscopy, tx surg- following with Dr Black to start chemo    DVT (deep vein thrombosis) in pregnancy     Essential hypertension     History of blood transfusion 1960's    No Reaction To Blood Transfusion Received    Hx of blood clots     left leg    Hx of colonic polyp     Hyperlipidemia     Hypertension     Impaired fasting glucose     Malignant neoplasm of colon (HCC)     PONV (postoperative nausea and vomiting)     denies hx of motion sickness    Shortness of breath on exertion     Teeth missing     Upper And Lower    UTI (urinary tract infection) In Past    No Current Symptoms    Wears dentures     Full Upper, Partial Lower    Wears glasses     Wears partial dentures     Lower       Past Surgical History:        Procedure Laterality Date    APPENDECTOMY  1960    BACK SURGERY  2012    ACF C4, C5 With Hardware    BLADDER SUSPENSION  2002    Done With Vaginal Hysterectomy    CHOLECYSTECTOMY, LAPAROSCOPIC  11/21/2016    COLONOSCOPY  7-17-13    Polyps x2, Internal hemorrhoids    COLONOSCOPY  01/08/2019    Ulcerated mass in sigmoid at 15 cm, Internal grade 1 hemorrhoids, otherwise normal to 40cm    COLONOSCOPY  10/17/2019    grade 1 int hem, s/p sigmoid resection, repeat in 3 years    COLONOSCOPY N/A 10/17/2019    COLORECTAL CANCER SCREENING, HIGH RISK performed by Livier Saldaña MD at [address] Left 2016    Broken Left Wrist With Hardware    HYSTERECTOMY VAGINAL  2002    Bladder Suspension Also Done    INSERTION / REMOVAL / REPLACEMENT VENOUS ACCESS CATHETER Right 2/20/2019    PORT INSERTION performed by Salma Kwong MD [address] N/A 1/8/2019    [facility] with tattoo performed by Livier Salda[facility] MD at [facility] N/A 1/16/2019    BOWEL RESECTION SIGMOID performed by Salma Kwong MD at [address] [place] [address]  Late 1970's    TUNNELED VENOUS PORT PLACEMENT  02/20/2019       Social History:    Social History     Tobacco Use    Smoking status: Never Smoker    Smokeless tobacco: Never Used   Substance Use Topics    Alcohol use: No                                Counseling given: Not Answered      Vital Signs (Current):   Vitals:    08/10/20 1330 08/10/20 2035 08/11/20 0315 08/11/20 0755   BP: 138/61 138/65 (!) 110/55 (!) 114/58   Pulse: 88 78 88 89   Resp: 16 16 15 16   Temp: 36.7 °C (98 °F) 36.9 °C (98.5 °F) 36.8 °C (98.3 °F) 36.6 °C (97.9 °F)   TempSrc: Oral Oral Oral Oral   SpO2: 95% 92% 91% 91%   Weight: 196 lb (88.9 kg)  196 lb (88.9 kg)    Height: 5' (1.524 m)  5' (1.524 m)                                               BP Readings from Last 3 Encounters:   08/11/20 (!) 114/58   07/21/20 115/68   12/02/19 122/78       NPO Status:                                                                                 BMI:   Wt Readings from Last 3 Encounters:   08/11/20 196 lb (88.9 kg)   07/21/20 194 lb (88 kg)   06/03/20 186 lb 15.2 oz (84.8 kg)     Body mass index is 38.28 kg/m². CBC:   Lab Results   Component Value Date    WBC 8.4 08/11/2020    RBC 3.67 08/11/2020    HGB 9.6 08/11/2020    HCT 32.3 08/11/2020    MCV 88.0 08/11/2020    RDW 13.7 08/11/2020     08/11/2020       CMP:   Lab Results   Component Value Date     08/11/2020    K 4.6 08/11/2020     08/11/2020    CO2 23 08/11/2020    BUN 27 08/11/2020    CREATININE 1.8 08/11/2020    GFRAA 33 08/11/2020    AGRATIO 1.7 07/17/2020    LABGLOM 27 08/11/2020    GLUCOSE 117 08/11/2020    PROT 7.2 08/10/2020    CALCIUM 9.2 08/11/2020    BILITOT 0.3 08/10/2020    ALKPHOS 112 08/10/2020    AST 18 08/10/2020    ALT 14 08/10/2020       POC Tests: No results for input(s): POCGLU, POCNA, POCK, POCCL, POCBUN, POCHEMO, POCHCT in the last 72 hours.     Coags:   Lab Results   Component Value Date    PROTIME 12.6 03/12/2019    INR 1.09 03/12/2019    APTT >240.0 03/12/2019       HCG (If Applicable): No results found for: PREGTESTUR, PREGSERUM, HCG, HCGQUANT     ABGs: No results found for: PHART, PO2ART, WZW7OLQ, EDN1EKW, BEART, D9FAVPYT     Type & Screen (If Applicable):  No results found for: LABABO, LABRH    Drug/Infectious Status (If Applicable):  No results found for: HIV, HEPCAB    COVID-19 Screening (If Applicable): No results found for: COVID19      Anesthesia Evaluation  Patient summary reviewed   history of anesthetic complications: PONV. Airway: Mallampati: II        Dental:          Pulmonary:normal exam                               Cardiovascular:    (+) hypertension:,                   Neuro/Psych:   (+) psychiatric history:             ROS comment: Depression  GI/Hepatic/Renal:   (+) morbid obesity         ROS comment: Ct  1. Stable 10 mm ground-glass nodule in the left upper lobe as well as a 3-4   mm solid nodule in the left lower lobe dating back to 02/14/2019.  Continued   attention on follow-up exams is recommended. 2. No new findings of metastatic disease in the chest, abdomen or pelvis. 3. Moderate to large hiatal hernia. .   Endo/Other:    (+) malignancy/cancer. Abdominal:   (+) obese,         Vascular:                                      Anesthesia Plan      MAC     ASA 3       Induction: intravenous. Anesthetic plan and risks discussed with patient.

## 2020-08-11 NOTE — CONSULTS
Department of Internal Medicine  Gastroenterology Consult Note  Shaw Smith. Otis SANDHU      Reason for Consult:  colitis    Primary Care Physician:  Cuate Berry MD    History Obtained From:  patient    HISTORY OF PRESENT ILLNESS:              The patient is a 75 y.o.  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 She had follow-up colonoscopy 10/2019 that was unremarkable except for hemorrhoids. She has had some intermittent explosive diarrhea (about once per week) since the bowel resection. For the past few days though she has had greatly increased diarrhea associated with crampy lower abd pain and nausea but no vomiting. She has hemorrhoids and saw some red blood on the stool and on the tissue. Stool pathogen PCR is negative and C diff is pending. CT showed colitis of the descending and sigmoid colon. She denies travel, suspicious food, or new meds. No one at home was ill.     Past Medical History:        Diagnosis Date    Abnormal EKG      see ekg report-11/4/2016-\"have appt 11/18/2016 to see Dr Mirtha Garcia to get heart clearance for surgery - 11/21/2016\"    Anemia     Chemotherapy induced diarrhea     Chronic kidney disease, stage 3 (HCC)     Colon Cancer Dx 1-8-19    Colonoscopy, tx surg- following with Dr Black to start chemo    DVT (deep vein thrombosis) in pregnancy     Essential hypertension     History of blood transfusion 1960's    No Reaction To Blood Transfusion Received    Hx of blood clots     left leg    Hx of colonic polyp     Hyperlipidemia     Hypertension     Impaired fasting glucose     Malignant neoplasm of colon (HCC)     PONV (postoperative nausea and vomiting)     denies hx of motion sickness    Shortness of breath on exertion     Teeth missing     Upper And Lower    UTI (urinary tract infection) In Past    No Current Symptoms    Wears dentures     Full Upper, Partial Lower    Wears glasses     Wears partial dentures     Lower       Past Surgical History: Procedure Laterality Date    APPENDECTOMY  1960    BACK SURGERY  2012    ACF C4, C5 With Hardware    BLADDER SUSPENSION  2002    Done With Vaginal Hysterectomy    CHOLECYSTECTOMY, LAPAROSCOPIC  11/21/2016    COLONOSCOPY  7-17-13    Polyps x2, Internal hemorrhoids    COLONOSCOPY  01/08/2019    Ulcerated mass in sigmoid at 15 cm, Internal grade 1 hemorrhoids, otherwise normal to 40cm    COLONOSCOPY  10/17/2019    grade 1 int hem, s/p sigmoid resection, repeat in 3 years    COLONOSCOPY N/A 10/17/2019    COLORECTAL CANCER SCREENING, HIGH RISK performed by Nila Murphy MD at [address] Left 2016    Broken Left Wrist With Hardware    HYSTERECTOMY VAGINAL  2002    Bladder Suspension Also Done    INSERTION / REMOVAL / REPLACEMENT VENOUS ACCESS CATHETER Right 2/20/2019    PORT INSERTION performed by Kristian Villegas MD at [address] 1/8/2019    [facility] with tattoo performed by Nila Murphy MD at [place] [address] N/A 1/16/2019    BOWEL RESECTION SIGMOID performed by Kristian Villegas MD at [address] [place] [address]  Late 1970's    TUNNELED VENOUS PORT PLACEMENT  02/20/2019       Medications Prior to Admission:    Prior to Admission medications    Medication Sig Start Date End Date Taking?  Authorizing Provider   famotidine (PEPCID) 20 MG tablet TAKE 1 TABLET BY MOUTH TWICE DAILY 6/2/20   Vandana Jerry MD   ondansetron (ZOFRAN) 8 MG tablet TK 1 T PO Q 8 H PRF NAUSEA OR VOM 6/2/20   Vandana Jerry MD   triamterene-hydroCHLOROthiazide [facility]) 37.5-25 MG per tablet Take 1 tablet by mouth every morning 6/2/20   Vandana Jerry MD   lisinopril (PRINIVIL;ZESTRIL) 10 MG tablet TAKE 1 TABLET BY MOUTH EVERY DAY 6/2/20   Vandana Jerry MD   atorvastatin (LIPITOR) 40 MG tablet Take 1 tablet by mouth nightly 6/2/20   Vandana Jerry MD   sertraline (ZOLOFT) 50 MG tablet Take 1 tablet by mouth every morning 6/2/20   Austin Other, MD   Acetaminophen (TYLENOL EX ST ARTHRITIS PAIN PO) Take by mouth daily as needed TAKE PRN    Historical Provider, MD       Allergies: Allergies   Allergen Reactions    Neosporin [Neomycin-Polymyx-Gramicid] Swelling   . Social History:    TOBACCO:  No  ETOH:  No    Family History:   Family History   Problem Relation Age of Onset    Diabetes Mother     Cancer Mother         \"Brain Cancer\"    Tuberculosis Father     Diabetes Sister     COPD Sister     Cancer Brother         Lung Cancer    Early Death Brother 61        Lung Cancer    Early Death Daughter 47        Lung Cancer    Cancer Daughter         Lung Cancer    Cancer Daughter         Breast Cancer    Breast Cancer Daughter     Early Death Daughter 52        Breast Cancer       REVIEW OF SYSTEMS: (POSITIVES WILL BE UNDERLINED)  CONSTITUTIONAL:    Weight change,fatigue, fever, chills  EYES:  Diplopia, change in vision  EARS:  hearing loss, tinnitus, vertigo  NOSE:   epistaxis  MOUTH/THROAT:     hoarseness, sore throat. RESPIRATORY:  SOB,  cough, sputum, hemoptysis  CARDIOVASCULAR : chest pain,palpitations, dyspnea exertion, orthopnea, paroxysmal nocturnal dyspnea, pedal edema. GASTROINTESTINAL:  See HPI  GENITOURINARY:   dysuria, hematuria, . HEMATOLOGIC/LYMPHATIC:   Anemia, bleeding tendencies.   MUSCULOSKELETAL:    myalgias,  joint pain  NEUROLOGICAL:   Loss of Consciousness, paresthesias, anesthesias, focal weakness  SKIN :   History of dermatitis, rashes  PSYCHIATRIC:  depression, , anxiety past psychosis  ENDOCRINE:  History of diabetes, thyroid disease  ALL/IMM : allergies, rashes    PHYSICAL EXAM:    Vitals:  BP (!) 110/55   Pulse 88   Temp 98.3 °F (36.8 °C) (Oral)   Resp 15   Ht 5' (1.524 m)   Wt 196 lb (88.9 kg)   SpO2 91%   BMI 38.28 kg/m²   CONSTITUTIONAL: alert, cooperative, no apparent distress,   EYES:  pupils equal, round and reactive to light and sclera clear  ENT: normocepalic, without obvious abnormality  NECK:  supple, symmetrical, trachea midline  HEMATOLOGIC/LYMPHATICS:  no cervical lymphadenopathy and no supraclavicular lymphadenopathy  LUNGS:  clear to auscultation  CARDIOVASCULAR:  regular rate and rhythm and no murmur noted  ABDOMEN:  Soft, non-tender with normal bowel sounds. No organomegaly or masses  NEUROLOGIC: no focal deficit detected  SKIN:  no lesions  EXTREMITIES: no clubbing, cyanosis, or edema    IMPRESSION:  1) left-sided colitis- infectious vs ischemic      RECOMMENDATIONS:  1) await stool C diff  2) tentatively plan sigmoidoscopy for tomorrow am if C diff negative and diarrhea persists        Shay Briggs M.D

## 2020-08-12 ENCOUNTER — ANESTHESIA (OUTPATIENT)
Dept: ENDOSCOPY | Age: 75
DRG: 392 | End: 2020-08-12
Payer: MEDICARE

## 2020-08-12 VITALS
WEIGHT: 205 LBS | OXYGEN SATURATION: 96 % | SYSTOLIC BLOOD PRESSURE: 114 MMHG | TEMPERATURE: 97.9 F | HEIGHT: 60 IN | HEART RATE: 67 BPM | RESPIRATION RATE: 18 BRPM | BODY MASS INDEX: 40.25 KG/M2 | DIASTOLIC BLOOD PRESSURE: 56 MMHG

## 2020-08-12 VITALS
SYSTOLIC BLOOD PRESSURE: 106 MMHG | DIASTOLIC BLOOD PRESSURE: 40 MMHG | RESPIRATION RATE: 24 BRPM | OXYGEN SATURATION: 95 %

## 2020-08-12 LAB
CULTURE: ABNORMAL
Lab: ABNORMAL
SARS-COV-2, NAAT: NOT DETECTED
SPECIMEN: ABNORMAL

## 2020-08-12 PROCEDURE — 0DBP8ZX EXCISION OF RECTUM, VIA NATURAL OR ARTIFICIAL OPENING ENDOSCOPIC, DIAGNOSTIC: ICD-10-PCS | Performed by: STUDENT IN AN ORGANIZED HEALTH CARE EDUCATION/TRAINING PROGRAM

## 2020-08-12 PROCEDURE — U0002 COVID-19 LAB TEST NON-CDC: HCPCS

## 2020-08-12 PROCEDURE — 3700000001 HC ADD 15 MINUTES (ANESTHESIA): Performed by: SPECIALIST

## 2020-08-12 PROCEDURE — 6360000002 HC RX W HCPCS: Performed by: NURSE ANESTHETIST, CERTIFIED REGISTERED

## 2020-08-12 PROCEDURE — 6370000000 HC RX 637 (ALT 250 FOR IP): Performed by: INTERNAL MEDICINE

## 2020-08-12 PROCEDURE — 2580000003 HC RX 258: Performed by: INTERNAL MEDICINE

## 2020-08-12 PROCEDURE — 6360000002 HC RX W HCPCS: Performed by: INTERNAL MEDICINE

## 2020-08-12 PROCEDURE — 94761 N-INVAS EAR/PLS OXIMETRY MLT: CPT

## 2020-08-12 PROCEDURE — 3609010600 HC COLONOSCOPY POLYPECTOMY SNARE/COLD BIOPSY: Performed by: SPECIALIST

## 2020-08-12 PROCEDURE — 2500000003 HC RX 250 WO HCPCS: Performed by: INTERNAL MEDICINE

## 2020-08-12 PROCEDURE — 88305 TISSUE EXAM BY PATHOLOGIST: CPT | Performed by: PATHOLOGY

## 2020-08-12 PROCEDURE — 3700000000 HC ANESTHESIA ATTENDED CARE: Performed by: SPECIALIST

## 2020-08-12 PROCEDURE — 2580000003 HC RX 258: Performed by: NURSE ANESTHETIST, CERTIFIED REGISTERED

## 2020-08-12 PROCEDURE — 0DBQ8ZX EXCISION OF ANUS, VIA NATURAL OR ARTIFICIAL OPENING ENDOSCOPIC, DIAGNOSTIC: ICD-10-PCS | Performed by: STUDENT IN AN ORGANIZED HEALTH CARE EDUCATION/TRAINING PROGRAM

## 2020-08-12 PROCEDURE — 2709999900 HC NON-CHARGEABLE SUPPLY: Performed by: SPECIALIST

## 2020-08-12 PROCEDURE — 6370000000 HC RX 637 (ALT 250 FOR IP): Performed by: STUDENT IN AN ORGANIZED HEALTH CARE EDUCATION/TRAINING PROGRAM

## 2020-08-12 PROCEDURE — 2500000003 HC RX 250 WO HCPCS: Performed by: NURSE ANESTHETIST, CERTIFIED REGISTERED

## 2020-08-12 RX ORDER — LIDOCAINE HYDROCHLORIDE 20 MG/ML
INJECTION, SOLUTION INFILTRATION; PERINEURAL PRN
Status: DISCONTINUED | OUTPATIENT
Start: 2020-08-12 | End: 2020-08-12 | Stop reason: SDUPTHER

## 2020-08-12 RX ORDER — LOPERAMIDE HYDROCHLORIDE 2 MG/1
2 CAPSULE ORAL 4 TIMES DAILY PRN
Status: DISCONTINUED | OUTPATIENT
Start: 2020-08-12 | End: 2020-08-12 | Stop reason: HOSPADM

## 2020-08-12 RX ORDER — ONDANSETRON HYDROCHLORIDE 8 MG/1
TABLET, FILM COATED ORAL
Qty: 30 TABLET | Refills: 0 | Status: SHIPPED | OUTPATIENT
Start: 2020-08-12 | End: 2021-07-26 | Stop reason: SDUPTHER

## 2020-08-12 RX ORDER — LOPERAMIDE HYDROCHLORIDE 2 MG/1
2 CAPSULE ORAL 4 TIMES DAILY PRN
Qty: 30 CAPSULE | Refills: 0 | Status: SHIPPED | OUTPATIENT
Start: 2020-08-12 | End: 2020-09-03 | Stop reason: SDUPTHER

## 2020-08-12 RX ORDER — PROPOFOL 10 MG/ML
INJECTION, EMULSION INTRAVENOUS PRN
Status: DISCONTINUED | OUTPATIENT
Start: 2020-08-12 | End: 2020-08-12 | Stop reason: SDUPTHER

## 2020-08-12 RX ORDER — SODIUM CHLORIDE 9 MG/ML
INJECTION, SOLUTION INTRAVENOUS CONTINUOUS PRN
Status: DISCONTINUED | OUTPATIENT
Start: 2020-08-12 | End: 2020-08-12 | Stop reason: SDUPTHER

## 2020-08-12 RX ORDER — ONDANSETRON 2 MG/ML
INJECTION INTRAMUSCULAR; INTRAVENOUS PRN
Status: DISCONTINUED | OUTPATIENT
Start: 2020-08-12 | End: 2020-08-12 | Stop reason: SDUPTHER

## 2020-08-12 RX ORDER — SODIUM CHLORIDE, SODIUM LACTATE, POTASSIUM CHLORIDE, CALCIUM CHLORIDE 600; 310; 30; 20 MG/100ML; MG/100ML; MG/100ML; MG/100ML
INJECTION, SOLUTION INTRAVENOUS CONTINUOUS PRN
Status: DISCONTINUED | OUTPATIENT
Start: 2020-08-12 | End: 2020-08-12

## 2020-08-12 RX ADMIN — SODIUM CHLORIDE, PRESERVATIVE FREE 10 ML: 5 INJECTION INTRAVENOUS at 10:05

## 2020-08-12 RX ADMIN — FAMOTIDINE 20 MG: 20 TABLET ORAL at 10:04

## 2020-08-12 RX ADMIN — METRONIDAZOLE 500 MG: 500 INJECTION, SOLUTION INTRAVENOUS at 10:04

## 2020-08-12 RX ADMIN — SODIUM CHLORIDE: 9 INJECTION, SOLUTION INTRAVENOUS at 07:25

## 2020-08-12 RX ADMIN — ONDANSETRON 4 MG: 2 INJECTION INTRAMUSCULAR; INTRAVENOUS at 07:27

## 2020-08-12 RX ADMIN — SODIUM CHLORIDE: 9 INJECTION, SOLUTION INTRAVENOUS at 05:46

## 2020-08-12 RX ADMIN — SODIUM CHLORIDE: 9 INJECTION, SOLUTION INTRAVENOUS at 06:58

## 2020-08-12 RX ADMIN — PROPOFOL 150 MG: 10 INJECTION, EMULSION INTRAVENOUS at 07:27

## 2020-08-12 RX ADMIN — LIDOCAINE HYDROCHLORIDE 40 MG: 20 INJECTION, SOLUTION INFILTRATION; PERINEURAL at 07:27

## 2020-08-12 RX ADMIN — CIPROFLOXACIN 400 MG: 2 INJECTION, SOLUTION INTRAVENOUS at 10:04

## 2020-08-12 RX ADMIN — SERTRALINE HYDROCHLORIDE 50 MG: 50 TABLET ORAL at 10:04

## 2020-08-12 RX ADMIN — LOPERAMIDE HYDROCHLORIDE 2 MG: 2 CAPSULE ORAL at 11:29

## 2020-08-12 RX ADMIN — ONDANSETRON 4 MG: 2 INJECTION INTRAMUSCULAR; INTRAVENOUS at 06:36

## 2020-08-12 ASSESSMENT — PAIN SCALES - GENERAL
PAINLEVEL_OUTOF10: 0

## 2020-08-12 ASSESSMENT — PULMONARY FUNCTION TESTS
PIF_VALUE: 0
PIF_VALUE: 1
PIF_VALUE: 0
PIF_VALUE: 1
PIF_VALUE: 1
PIF_VALUE: 0
PIF_VALUE: 0
PIF_VALUE: 1
PIF_VALUE: 0
PIF_VALUE: 1

## 2020-08-12 NOTE — TELEPHONE ENCOUNTER
Please have her hold this medicine for now as this was stopped due to her decreased kidney function and monitor her blood pressures with a home blood pressure cuff. If they start elevating above 140/85 or if she starts having symptoms such as headaches, blurred vision, chest pain, shortness of breath she is to call us immediately.

## 2020-08-12 NOTE — DISCHARGE INSTR - DIET
 Good nutrition is important when healing from an illness, injury, or surgery. Follow any nutrition recommendations given to you during your hospital stay.  If you were given an oral nutrition supplement while in the hospital, continue to take this supplement at home. You can take it with meals, in-between meals, and/or before bedtime. These supplements can be purchased at most local grocery stores, pharmacies, and chain super-stores.  If you have any questions about your diet or nutrition, call the hospital and ask for the dietitian.       Dietary modification [ IBS-D]  - Exclusion of gas-producing foods  - Low in fermentable oligo-, di-, and monosaccharides and polyols (FODMAPs)  - Avoid lactose   - Avoid gluten avoidance  - Consider food allergy testing

## 2020-08-12 NOTE — DISCHARGE SUMMARY
Discharge Summary    Name:  Lissa Zapien /Age/Sex: 1945  (75 y.o. female)   MRN & CSN:  [id number] & [id number] Admission Date/Time: 8/10/2020 12:16 AM   Attending:  Allyson Issa DO Discharging Physician: Allyson Issa DO     HPI and Hospital Course:   Lissa Zapien is a 75 y.o.  female  who presents with Abdominal Pain; Emesis; and Hemorrhoids    · Acute on chronic left sided Colitis with diarrhea, presumed related to her colectomy in . Likely ischemic but infectious causes being ruled out. § GI on board - pending C diff. Sigmoidoscopy completed  which confirmed internal hemorrhoids, a polyp which was removed in the rectum, and nonspecific colitis. Outpatient follow-up for pathology results. § On day of discharge the patient states she is not having extreme diarrhea. She is able to tolerate p.o. intake of liquids and solids. Discussed outpatient follow-up with GI which she confirms. · Hx colon cancer s/p sigmoid resection (2019)  · CKD 3, stable       The patient expressed appropriate understanding of and agreement with the discharge recommendations, medications, and plan.      Consults this admission:  IP CONSULT TO HOSPITALIST  IP CONSULT TO GI    Discharge Instruction:   Follow up appointments: GI  Primary care physician:  within 2 weeks    Diet:  General/cardiac/ADA/as tolerated  Activity: {discharge activity: as tolerated  Disposition: Discharged to:   [x]Home, [][facility], []SNF, []Acute Rehab, []Hospice   Condition on discharge: Stable    Discharge Medications:      Genaro Solis   Home Medication Instructions [id number]    Printed on:20 7028   Medication Information                      Acetaminophen (TYLENOL EX ST ARTHRITIS PAIN PO)  Take by mouth daily as needed TAKE PRN             atorvastatin (LIPITOR) 40 MG tablet  Take 1 tablet by mouth nightly             famotidine (PEPCID) 20 MG tablet  TAKE 1 TABLET BY MOUTH TWICE DAILY

## 2020-08-12 NOTE — PROGRESS NOTES
Physician Progress Note      PATIENTValentino Child  CSN #:                  [id number]  :                       1945  ADMIT DATE:       8/10/2020 12:16 AM  DISCH DATE:  RESPONDING  PROVIDER #:        VINNIE QUINONEZ DO          QUERY TEXT:      Pt admitted with Noninfective Gastroenteritis and colitis/ UTI . Pt noted to   have WBC 13.3. If possible, please document in the progress notes and   discharge summary if you are evaluating and /or treating any of the following:    Sepsis, present on admission[de-identified] This patient has sepsis which was present on   admission.]]    The medical record reflects the following:  Risk Factors: Noninfective Gastroenteritis and colitis/ UTI ,DIMAS  Clinical Indicators: , WBC 13.3, Segs 89.6,UA-slightly cloudy, moderate   blood, large leukocyte esterase, UTI,Renal function worse than baseline with   Cr 2.7, GFR 17.  UA with large leuks, 15 RBCs, 31 WBCs, many bacteria. Will   culture and treat. Fluids started for DIMAS. Treatment: NS, Rocephin, Bentyl, Zofran, UA, Cdiff, Hold lisinopril,   triamterene/hydrochlorothiazide  Options provided:  -- No Sepsis, localized infection only  -- Other - I will add my own diagnosis  -- Disagree - Not applicable / Not valid  -- Disagree - Clinically unable to determine / Unknown  -- Refer to Clinical Documentation Reviewer    PROVIDER RESPONSE TEXT:    This patient has localized infection only, patient is not septic.     Query created by: Liberty Strickland on 2020 9:27 AM      Electronically signed by:  Beth Barrett DO 2020 8:34 AM

## 2020-08-12 NOTE — TELEPHONE ENCOUNTER
Pt was just discharged from hospital today and they took her off of her blood pressure? Medicine . Tonye Cogan ...they said to check with pcp to see if he wanted her back on it. She has an appointment with you on the 18th.

## 2020-08-12 NOTE — ANESTHESIA POSTPROCEDURE EVALUATION
Department of Anesthesiology  Postprocedure Note    Patient: Grant Taylor  MRN: [id number]  YOB: 1945  Date of evaluation: 8/12/2020  Time:  7:56 AM     Procedure Summary     Date:  08/12/20 Room / Location:  [address]    Anesthesia Start:  Barbra Clink Anesthesia Stop:  2386    Procedure:  COLONOSCOPY POLYPECTOMY SNARE/COLD BIOPSY (N/A ) Diagnosis:  (COLITIS)    Surgeon:  Karin Loja MD Responsible Provider:  SEAN Worthington CRNA    Anesthesia Type:  MAC ASA Status:  3          Anesthesia Type: MAC    Steven Phase I:      Steven Phase II:      Last vitals: Reviewed and per EMR flowsheets.        Anesthesia Post Evaluation    Patient location during evaluation: PACU  Patient participation: complete - patient participated  Level of consciousness: awake and alert  Pain score: 0  Airway patency: patent  Nausea & Vomiting: no vomiting and no nausea  Complications: no  Cardiovascular status: hemodynamically stable and blood pressure returned to baseline  Respiratory status: acceptable, nonlabored ventilation, spontaneous ventilation and room air  Hydration status: stable

## 2020-08-13 ENCOUNTER — TELEPHONE (OUTPATIENT)
Dept: FAMILY MEDICINE CLINIC | Age: 75
End: 2020-08-13

## 2020-08-13 NOTE — TELEPHONE ENCOUNTER
DIPTI PLEASE READ! Spoke to Cheli Allen and informed her to hold this medication due to her kidney functions. She doesn't have a BP cuff at home, if we could send one to [facility] and see if her insurance will cover it or the pharmacist can give her prices on them, she will get one. She has not experienced any of the symptoms I went over with her, she feels pretty good. if her BP rises over 140/85, or she becomes symptomatic she will call us.

## 2020-08-18 ENCOUNTER — OFFICE VISIT (OUTPATIENT)
Dept: FAMILY MEDICINE CLINIC | Age: 75
End: 2020-08-18
Payer: MEDICARE

## 2020-08-18 VITALS
DIASTOLIC BLOOD PRESSURE: 70 MMHG | TEMPERATURE: 98.1 F | OXYGEN SATURATION: 97 % | HEART RATE: 73 BPM | HEIGHT: 60 IN | BODY MASS INDEX: 37.79 KG/M2 | WEIGHT: 192.5 LBS | SYSTOLIC BLOOD PRESSURE: 118 MMHG

## 2020-08-18 DIAGNOSIS — N28.9 DECREASED RENAL FUNCTION: ICD-10-CM

## 2020-08-18 LAB
A/G RATIO: 1.7 (ref 1.1–2.2)
ALBUMIN SERPL-MCNC: 4 G/DL (ref 3.4–5)
ALP BLD-CCNC: 89 U/L (ref 40–129)
ALT SERPL-CCNC: 22 U/L (ref 10–40)
ANION GAP SERPL CALCULATED.3IONS-SCNC: 15 MMOL/L (ref 3–16)
AST SERPL-CCNC: 21 U/L (ref 15–37)
BASOPHILS ABSOLUTE: 0 K/UL (ref 0–0.2)
BASOPHILS RELATIVE PERCENT: 0.6 %
BILIRUB SERPL-MCNC: 0.3 MG/DL (ref 0–1)
BUN BLDV-MCNC: 21 MG/DL (ref 7–20)
CALCIUM SERPL-MCNC: 9.4 MG/DL (ref 8.3–10.6)
CHLORIDE BLD-SCNC: 101 MMOL/L (ref 99–110)
CO2: 23 MMOL/L (ref 21–32)
CREAT SERPL-MCNC: 1.9 MG/DL (ref 0.6–1.2)
EOSINOPHILS ABSOLUTE: 0.1 K/UL (ref 0–0.6)
EOSINOPHILS RELATIVE PERCENT: 1.7 %
GFR AFRICAN AMERICAN: 31
GFR NON-AFRICAN AMERICAN: 26
GLOBULIN: 2.4 G/DL
GLUCOSE BLD-MCNC: 128 MG/DL (ref 70–99)
HCT VFR BLD CALC: 33.5 % (ref 36–48)
HEMOGLOBIN: 10.9 G/DL (ref 12–16)
LYMPHOCYTES ABSOLUTE: 0.9 K/UL (ref 1–5.1)
LYMPHOCYTES RELATIVE PERCENT: 15.2 %
MCH RBC QN AUTO: 26.3 PG (ref 26–34)
MCHC RBC AUTO-ENTMCNC: 32.6 G/DL (ref 31–36)
MCV RBC AUTO: 80.9 FL (ref 80–100)
MONOCYTES ABSOLUTE: 0.5 K/UL (ref 0–1.3)
MONOCYTES RELATIVE PERCENT: 9.2 %
NEUTROPHILS ABSOLUTE: 4.2 K/UL (ref 1.7–7.7)
NEUTROPHILS RELATIVE PERCENT: 73.3 %
PDW BLD-RTO: 15.2 % (ref 12.4–15.4)
PLATELET # BLD: 195 K/UL (ref 135–450)
PMV BLD AUTO: 8.7 FL (ref 5–10.5)
POTASSIUM SERPL-SCNC: 4.4 MMOL/L (ref 3.5–5.1)
RBC # BLD: 4.15 M/UL (ref 4–5.2)
SODIUM BLD-SCNC: 139 MMOL/L (ref 136–145)
TOTAL PROTEIN: 6.4 G/DL (ref 6.4–8.2)
WBC # BLD: 5.8 K/UL (ref 4–11)

## 2020-08-18 PROCEDURE — G8427 DOCREV CUR MEDS BY ELIG CLIN: HCPCS | Performed by: FAMILY MEDICINE

## 2020-08-18 PROCEDURE — 99214 OFFICE O/P EST MOD 30 MIN: CPT | Performed by: FAMILY MEDICINE

## 2020-08-18 PROCEDURE — G8400 PT W/DXA NO RESULTS DOC: HCPCS | Performed by: FAMILY MEDICINE

## 2020-08-18 PROCEDURE — G8417 CALC BMI ABV UP PARAM F/U: HCPCS | Performed by: FAMILY MEDICINE

## 2020-08-18 PROCEDURE — 1111F DSCHRG MED/CURRENT MED MERGE: CPT | Performed by: FAMILY MEDICINE

## 2020-08-18 PROCEDURE — 1090F PRES/ABSN URINE INCON ASSESS: CPT | Performed by: FAMILY MEDICINE

## 2020-08-18 PROCEDURE — 3017F COLORECTAL CA SCREEN DOC REV: CPT | Performed by: FAMILY MEDICINE

## 2020-08-18 PROCEDURE — 1036F TOBACCO NON-USER: CPT | Performed by: FAMILY MEDICINE

## 2020-08-18 PROCEDURE — 4040F PNEUMOC VAC/ADMIN/RCVD: CPT | Performed by: FAMILY MEDICINE

## 2020-08-18 PROCEDURE — 1123F ACP DISCUSS/DSCN MKR DOCD: CPT | Performed by: FAMILY MEDICINE

## 2020-08-18 ASSESSMENT — ENCOUNTER SYMPTOMS
COUGH: 0
DIARRHEA: 0
CONSTIPATION: 0
SHORTNESS OF BREATH: 0
ABDOMINAL PAIN: 0
VOMITING: 0
NAUSEA: 0

## 2020-08-18 NOTE — PROGRESS NOTES
8/18/2020     Rodrigo Smith is a 75 y.o. female who presents today with:  Chief Complaint   Patient presents with    Other     4 week f/u , pt was in the hosp last sun . pt received alot of antibiotics and fluids . she also had a small form of a colonoscopy  by Dr Suad Young . Pt states that today she had a  some what formed BM today . .    /70   Pulse 73   Temp 98.1 °F (36.7 °C)   Ht 5' (1.524 m)   Wt 192 lb 8 oz (87.3 kg)   SpO2 97%   BMI 37.60 kg/m²     HPI  This is a hospital follow up. History was obtained from the pt. She was admitted to the hospital from 8/9/20 - 8/12/20 due to her abd pain, vomiting, and diarrhea. She was treated for colitis and a UTI with ceftriaxone as well as ciprofloxacin. She did have acute kidney injury noted and so they were holding her triamterene/hydrochlorothiazide as well as her lisinopril. Patient has been monitoring her ambulatory blood pressures, and they have been elevated. She has been off of her antihypertensive medications and so we will recheck that today to see if those can be restarted. She had a colonoscopy completed which showed nonspecific colitis and a superficial ulceration that was biopsied. Still awaiting the pathology reports. She is to follow up with Dr. Suad Young in the next 3-4 weeks. FODMAP diet suggested by hospitalist, normal diet per francisco, and she is to continue using a probiotic. [facility] [place] Needs to follow with them for ulcer pathology. Patient notes increased anxiety due to her current medical situation. She is taking Zoloft 50 mg 1 tablet p.o. daily. She was open to increasing this dose to 1.5 tablets p.o. daily to see if this helps better manage her anxiety. REVIEW OF SYMPTOMS    Review of Systems   Constitutional: Negative for chills, fatigue and fever. Eyes: Negative for visual disturbance (no blurred or double vision. ). Respiratory: Negative for cough and shortness of breath.     Cardiovascular: Positive for leg swelling (LLE - more swollen than normal). Negative for chest pain and palpitations. Gastrointestinal: Negative for abdominal pain, constipation, diarrhea (last episode during hospitalization), nausea and vomiting. Neurological: Negative for dizziness, light-headedness and headaches.        PAST MEDICAL HISTORY  Past Medical History:   Diagnosis Date    Abnormal EKG      see ekg report-11/4/2016-\"have appt 11/18/2016 to see Dr Robert Mott to get heart clearance for surgery - 11/21/2016\"    Anemia     Chemotherapy induced diarrhea     Chronic kidney disease, stage 3 ([facility]     Colon Cancer Dx 1-8-19    Colonoscopy, tx surg- following with Dr Black to start chemo    DVT (deep vein thrombosis) in pregnancy     Essential hypertension     History of blood transfusion 1960's    No Reaction To Blood Transfusion Received    Hx of blood clots     left leg    Hx of colonic polyp     Hyperlipidemia     Hypertension     Impaired fasting glucose     Malignant neoplasm of colon ([facility]     PONV (postoperative nausea and vomiting)     denies hx of motion sickness    Shortness of breath on exertion     Teeth missing     Upper And Lower    UTI (urinary tract infection) In Past    No Current Symptoms    Wears dentures     Full Upper, Partial Lower    Wears glasses     Wears partial dentures     Lower       FAMILY HISTORY  Family History   Problem Relation Age of Onset    Diabetes Mother     Cancer Mother         \"Brain Cancer\"    Tuberculosis Father     Diabetes Sister     COPD Sister     Cancer Brother         Lung Cancer    Early Death Brother 61        Lung Cancer    Early Death Daughter 47        Lung Cancer    Cancer Daughter         Lung Cancer    Cancer Daughter         Breast Cancer    Breast Cancer Daughter     Early Death Daughter 52        Breast Cancer       SOCIAL HISTORY  Social History     Socioeconomic History    Marital status:      Spouse name: None    Number of children: None    REMOVAL / REPLACEMENT VENOUS ACCESS CATHETER Right 2/20/2019    PORT INSERTION performed by Frederico Ormond, MD at [address] N/A 1/8/2019    SIGMOIDOSCOPY BIOPSY FLEXIBLE with tattoo performed by Monica Milan MD at [address] N/A 1/16/2019    BOWEL RESECTION SIGMOID performed by Frederico Ormond, MD at [facility]  Late 1970's    TUNNELED VENOUS PORT PLACEMENT  02/20/2019       CURRENT MEDICATIONS  Current Outpatient Medications   Medication Sig Dispense Refill    Blood Pressure Monitoring (BLOOD PRESSURE KIT) JOSE DAVID Use as directed 1 Device 0    loperamide (IMODIUM) 2 MG capsule Take 1 capsule by mouth 4 times daily as needed for Diarrhea 30 capsule 0    Probiotic Product (PROBIOTIC-10) CHEW Take 10 mg by mouth daily 30 tablet 0    ondansetron (ZOFRAN) 8 MG tablet TK 1 T PO Q 8 H PRF NAUSEA OR VOM 30 tablet 0    famotidine (PEPCID) 20 MG tablet TAKE 1 TABLET BY MOUTH TWICE DAILY 180 tablet 1    atorvastatin (LIPITOR) 40 MG tablet Take 1 tablet by mouth nightly 90 tablet 1    sertraline (ZOLOFT) 50 MG tablet Take 1 tablet by mouth every morning 90 tablet 1    Acetaminophen (TYLENOL EX ST ARTHRITIS PAIN PO) Take by mouth daily as needed TAKE PRN       No current facility-administered medications for this visit. ALLERGIES  Allergies   Allergen Reactions    Neosporin [Neomycin-Polymyx-Gramicid] Swelling       PHYSICAL EXAM    Physical Exam  Vitals signs and nursing note reviewed. Constitutional:       General: She is not in acute distress. Appearance: She is well-developed. She is not diaphoretic. HENT:      Head: Normocephalic and atraumatic. Cardiovascular:      Rate and Rhythm: Normal rate and regular rhythm. Heart sounds: Normal heart sounds. Pulmonary:      Effort: Pulmonary effort is normal. No respiratory distress. Breath sounds: Normal breath sounds.    Abdominal:      General: Bowel sounds are normal.      Palpations: Abdomen is soft. Tenderness: There is no abdominal tenderness. Musculoskeletal:         General: Swelling (Some swelling appreciated in the left lower extremity.) present. Comments: No calf tenderness to palpation of the LLE. Skin:     General: Skin is warm and dry. Comments: No swelling or erythema noted of the LLE. Neurological:      Mental Status: She is alert and oriented to person, place, and time. Psychiatric:         Thought Content: Thought content normal.         ASSESSMENT & PLAN    1. Decreased renal function  We will have patient repeat labs today to see if her kidney function has improved to determine which antihypertensive medication we need to put on, either restarting her lisinopril, or starting another medication entirely. Patient will have lab work completed today and medication dosages may be changed based on the lab results. Patient will be updated on lab results once they are completed and notified of any medication changes if necessary.  - Comprehensive Metabolic Panel; Future  - CBC Auto Differential; Future    2. Essential hypertension  Continue to monitor her ambulatory blood pressures and call if they remain elevated. 3. Swelling of left lower extremity  Due to patient's personal history of a DVT and swelling noted in the LLE, we will obtain an ultrasound of the left lower extremity to rule out a DVT. - VL LOWER EXTREMITY VENOUS LEFT; Future    4. Personal history of DVT (deep vein thrombosis)  See above (#3).  - VL LOWER EXTREMITY VENOUS LEFT; Future    5. Major depressive disorder with single episode, [facility] ([facility]  We will have patient increase her Zoloft dose from 50 mg, 1 tablet p.o. daily, to Zoloft 50 mg 1.5 tablets p.o. daily (75 mg total). Patient is to call with any increasing symptoms, questions or concerns. Patient is to follow-up as scheduled with Dr. Sherry Phillips, unless otherwise needed in that time.   Pt is to call with any questions, concerns, or increase in symptoms. Pt verbalized understanding of and agreement with current plan of care. Electronically signed by COLUMBA Ni on 8/18/2020      Please note that this chart was generated using dragon dictation software. Although every effort was made to ensure the accuracy of this automated transcription, some errors in transcription may have occurred.

## 2020-08-19 ENCOUNTER — HOSPITAL ENCOUNTER (OUTPATIENT)
Dept: ULTRASOUND IMAGING | Age: 75
Discharge: HOME | End: 2020-08-19
Payer: MEDICARE

## 2020-08-19 PROCEDURE — 93971 EXTREMITY STUDY: CPT

## 2020-09-03 ENCOUNTER — OFFICE VISIT (OUTPATIENT)
Dept: FAMILY MEDICINE CLINIC | Age: 75
End: 2020-09-03
Payer: MEDICARE

## 2020-09-03 VITALS
BODY MASS INDEX: 38.05 KG/M2 | SYSTOLIC BLOOD PRESSURE: 132 MMHG | HEART RATE: 81 BPM | WEIGHT: 193.8 LBS | HEIGHT: 60 IN | TEMPERATURE: 97.6 F | DIASTOLIC BLOOD PRESSURE: 80 MMHG | OXYGEN SATURATION: 97 %

## 2020-09-03 DIAGNOSIS — E61.1 IRON DEFICIENCY: Chronic | ICD-10-CM

## 2020-09-03 DIAGNOSIS — I10 ESSENTIAL HYPERTENSION: ICD-10-CM

## 2020-09-03 DIAGNOSIS — N18.30 CHRONIC KIDNEY DISEASE, STAGE 3 (HCC): ICD-10-CM

## 2020-09-03 DIAGNOSIS — R73.01 IMPAIRED FASTING GLUCOSE: ICD-10-CM

## 2020-09-03 PROBLEM — E66.01 MORBIDLY OBESE (HCC): Status: ACTIVE | Noted: 2020-09-03

## 2020-09-03 LAB
A/G RATIO: 1.7 (ref 1.1–2.2)
ALBUMIN SERPL-MCNC: 4.2 G/DL (ref 3.4–5)
ALP BLD-CCNC: 102 U/L (ref 40–129)
ALT SERPL-CCNC: 19 U/L (ref 10–40)
ANION GAP SERPL CALCULATED.3IONS-SCNC: 11 MMOL/L (ref 3–16)
AST SERPL-CCNC: 23 U/L (ref 15–37)
BASOPHILS ABSOLUTE: 0 K/UL (ref 0–0.2)
BASOPHILS RELATIVE PERCENT: 0.6 %
BILIRUB SERPL-MCNC: 0.4 MG/DL (ref 0–1)
BUN BLDV-MCNC: 19 MG/DL (ref 7–20)
CALCIUM SERPL-MCNC: 10 MG/DL (ref 8.3–10.6)
CHLORIDE BLD-SCNC: 103 MMOL/L (ref 99–110)
CHOLESTEROL, TOTAL: 165 MG/DL (ref 0–199)
CO2: 25 MMOL/L (ref 21–32)
CREAT SERPL-MCNC: 1.5 MG/DL (ref 0.6–1.2)
EOSINOPHILS ABSOLUTE: 0.2 K/UL (ref 0–0.6)
EOSINOPHILS RELATIVE PERCENT: 4.4 %
GFR AFRICAN AMERICAN: 41
GFR NON-AFRICAN AMERICAN: 34
GLOBULIN: 2.5 G/DL
GLUCOSE BLD-MCNC: 149 MG/DL (ref 70–99)
HCT VFR BLD CALC: 33.6 % (ref 36–48)
HDLC SERPL-MCNC: 42 MG/DL (ref 40–60)
HEMOGLOBIN: 10.9 G/DL (ref 12–16)
LDL CHOLESTEROL CALCULATED: 94 MG/DL
LYMPHOCYTES ABSOLUTE: 0.9 K/UL (ref 1–5.1)
LYMPHOCYTES RELATIVE PERCENT: 23 %
MCH RBC QN AUTO: 25.7 PG (ref 26–34)
MCHC RBC AUTO-ENTMCNC: 32.5 G/DL (ref 31–36)
MCV RBC AUTO: 79.2 FL (ref 80–100)
MONOCYTES ABSOLUTE: 0.4 K/UL (ref 0–1.3)
MONOCYTES RELATIVE PERCENT: 9.5 %
NEUTROPHILS ABSOLUTE: 2.4 K/UL (ref 1.7–7.7)
NEUTROPHILS RELATIVE PERCENT: 62.5 %
PDW BLD-RTO: 14.7 % (ref 12.4–15.4)
PLATELET # BLD: 228 K/UL (ref 135–450)
PMV BLD AUTO: 8.6 FL (ref 5–10.5)
POTASSIUM SERPL-SCNC: 4.6 MMOL/L (ref 3.5–5.1)
RBC # BLD: 4.25 M/UL (ref 4–5.2)
SODIUM BLD-SCNC: 139 MMOL/L (ref 136–145)
TOTAL PROTEIN: 6.7 G/DL (ref 6.4–8.2)
TRIGL SERPL-MCNC: 143 MG/DL (ref 0–150)
VLDLC SERPL CALC-MCNC: 29 MG/DL
WBC # BLD: 3.9 K/UL (ref 4–11)

## 2020-09-03 PROCEDURE — 1036F TOBACCO NON-USER: CPT | Performed by: FAMILY MEDICINE

## 2020-09-03 PROCEDURE — G8400 PT W/DXA NO RESULTS DOC: HCPCS | Performed by: FAMILY MEDICINE

## 2020-09-03 PROCEDURE — 99214 OFFICE O/P EST MOD 30 MIN: CPT | Performed by: FAMILY MEDICINE

## 2020-09-03 PROCEDURE — G8427 DOCREV CUR MEDS BY ELIG CLIN: HCPCS | Performed by: FAMILY MEDICINE

## 2020-09-03 PROCEDURE — 1123F ACP DISCUSS/DSCN MKR DOCD: CPT | Performed by: FAMILY MEDICINE

## 2020-09-03 PROCEDURE — 3017F COLORECTAL CA SCREEN DOC REV: CPT | Performed by: FAMILY MEDICINE

## 2020-09-03 PROCEDURE — 1111F DSCHRG MED/CURRENT MED MERGE: CPT | Performed by: FAMILY MEDICINE

## 2020-09-03 PROCEDURE — 1090F PRES/ABSN URINE INCON ASSESS: CPT | Performed by: FAMILY MEDICINE

## 2020-09-03 PROCEDURE — 4040F PNEUMOC VAC/ADMIN/RCVD: CPT | Performed by: FAMILY MEDICINE

## 2020-09-03 PROCEDURE — G8417 CALC BMI ABV UP PARAM F/U: HCPCS | Performed by: FAMILY MEDICINE

## 2020-09-03 RX ORDER — TRIAMTERENE AND HYDROCHLOROTHIAZIDE 37.5; 25 MG/1; MG/1
TABLET ORAL
COMMUNITY
Start: 2020-08-31 | End: 2020-10-01 | Stop reason: ALTCHOICE

## 2020-09-03 RX ORDER — SERTRALINE HYDROCHLORIDE 100 MG/1
100 TABLET, FILM COATED ORAL EVERY MORNING
Qty: 90 TABLET | Refills: 1 | Status: SHIPPED | OUTPATIENT
Start: 2020-09-03 | End: 2021-02-24

## 2020-09-03 RX ORDER — LOPERAMIDE HYDROCHLORIDE 2 MG/1
2 CAPSULE ORAL 4 TIMES DAILY PRN
Qty: 120 CAPSULE | Refills: 0 | Status: SHIPPED | OUTPATIENT
Start: 2020-09-03 | End: 2021-07-26 | Stop reason: SDUPTHER

## 2020-09-03 RX ORDER — FAMOTIDINE 20 MG/1
TABLET, FILM COATED ORAL
Qty: 180 TABLET | Refills: 1 | Status: SHIPPED | OUTPATIENT
Start: 2020-09-03 | End: 2021-07-26 | Stop reason: SDUPTHER

## 2020-09-03 ASSESSMENT — ENCOUNTER SYMPTOMS
RHINORRHEA: 0
SORE THROAT: 0

## 2020-09-03 NOTE — PROGRESS NOTES
insecurity     Worry: None     Inability: None    Transportation needs     Medical: None     Non-medical: None   Tobacco Use    Smoking status: Never Smoker    Smokeless tobacco: Never Used   Substance and Sexual Activity    Alcohol use: No    Drug use: No    Sexual activity: Never   Lifestyle    Physical activity     Days per week: None     Minutes per session: None    Stress: None   Relationships    Social connections     Talks on phone: None     Gets together: None     Attends Mandaen service: None     Active member of club or organization: None     Attends meetings of clubs or organizations: None     Relationship status: None    Intimate partner violence     Fear of current or ex partner: None     Emotionally abused: None     Physically abused: None     Forced sexual activity: None   Other Topics Concern    None   Social History Narrative    None        SURGICAL HISTORY  Past Surgical History:   Procedure Laterality Date    APPENDECTOMY  1960    BACK SURGERY  2012    ACF C4, C5 With Hardware    BLADDER SUSPENSION  2002    Done With Vaginal Hysterectomy    CHOLECYSTECTOMY, LAPAROSCOPIC  11/21/2016    COLONOSCOPY  7-17-13    Polyps x2, Internal hemorrhoids    COLONOSCOPY  01/08/2019    Ulcerated mass in sigmoid at 15 cm, Internal grade 1 hemorrhoids, otherwise normal to 40cm    COLONOSCOPY  10/17/2019    grade 1 int hem, s/p sigmoid resection, repeat in 3 years    COLONOSCOPY N/A 10/17/2019    COLORECTAL CANCER SCREENING, HIGH RISK performed by Valeria Myers MD at [address] N/A 8/12/2020    COLONOSCOPY POLYPECTOMY SNARE/COLD BIOPSY performed by Valeria Myers MD at [address] [place] [address] Left 2016    Broken Left Wrist With Hardware    HYSTERECTOMY VAGINAL  2002    Bladder Suspension Also Done    INSERTION / REMOVAL / REPLACEMENT VENOUS ACCESS CATHETER Right 2/20/2019    PORT INSERTION performed by Madhu Fragoso MD at [address] [facility] 1/8/2019    SIGMOIDOSCOPY BIOPSY FLEXIBLE with tattoo performed by Lolita Walker MD at [address] N/A 1/16/2019    BOWEL RESECTION SIGMOID performed by Virgilio Felipe MD at [address] [facility]  Late 1970's    TUNNELED VENOUS PORT PLACEMENT  02/20/2019       CURRENT MEDICATIONS  Current Outpatient Medications   Medication Sig Dispense Refill    triamterene-hydroCHLOROthiazide (MAXZIDE-25) 37.5-25 MG per tablet TK   1  T   PO   QAM      loperamide (IMODIUM) 2 MG capsule Take 1 capsule by mouth 4 times daily as needed for Diarrhea 120 capsule 0    Probiotic Product (PROBIOTIC-10) CHEW Take 10 mg by mouth daily 90 tablet 1    sertraline (ZOLOFT) 100 MG tablet Take 1 tablet by mouth every morning 90 tablet 1    Blood Pressure Monitoring (BLOOD PRESSURE KIT) JOSE DAVID Use as directed 1 Device 0    ondansetron (ZOFRAN) 8 MG tablet TK 1 T PO Q 8 H PRF NAUSEA OR VOM 30 tablet 0    famotidine (PEPCID) 20 MG tablet TAKE 1 TABLET BY MOUTH TWICE DAILY 180 tablet 1    atorvastatin (LIPITOR) 40 MG tablet Take 1 tablet by mouth nightly 90 tablet 1    Acetaminophen (TYLENOL EX ST ARTHRITIS PAIN PO) Take by mouth daily as needed TAKE PRN       No current facility-administered medications for this visit. ALLERGIES  Allergies   Allergen Reactions    Neosporin [Neomycin-Polymyx-Gramicid] Swelling       PHYSICAL EXAM    /80 (Site: Left Upper Arm, Position: Sitting, Cuff Size: Medium Adult)   Pulse 81   Temp 97.6 °F (36.4 °C)   Ht 5' (1.524 m)   Wt 193 lb 12.8 oz (87.9 kg)   SpO2 97%   BMI 37.85 kg/m²     Physical Exam  Vitals signs and nursing note reviewed. Constitutional:       General: She is not in acute distress. Appearance: Normal appearance. She is obese. HENT:      Right Ear: External ear normal.      Left Ear: External ear normal.   Eyes:      Conjunctiva/sclera: Conjunctivae normal.   Cardiovascular:      Rate and Rhythm: Normal rate.    Pulmonary: Effort: Pulmonary effort is normal.   Neurological:      General: No focal deficit present. Mental Status: She is alert. Psychiatric:         Mood and Affect: Mood normal.       Reviewed her hospital records  Reviewed colonoscopy report  Reviewed pathology report  Reconciled medications    Reviewed home blood pressures   Reviewed immunization history  ASSESSMENT and PLAN    Landy Merlin was seen today for 3 month follow-up. Diagnoses and all orders for this visit:    Iron deficiency    Morbidly obese (HCC)    Impaired fasting glucose    Essential hypertension    Chronic kidney disease, stage 3 (HCC)  -     Basic Metabolic Panel; Future    Cancer of sigmoid colon ([facility]    Ischemic colitis ([facility]    Other orders  -     loperamide (IMODIUM) 2 MG capsule; Take 1 capsule by mouth 4 times daily as needed for Diarrhea  -     Probiotic Product (PROBIOTIC-10) CHEW; Take 10 mg by mouth daily  -     sertraline (ZOLOFT) 100 MG tablet; Take 1 tablet by mouth every morning    Increase sertraline to 100 mg daily  Finish the probiotic she has but she may not need to take them long-term  Stop the diuretic for blood pressure just take the lisinopril  Follow-up in about 4 weeks  I am getting lab repeat today on her renal function  Get flu vaccine when first available          No follow-ups on file. Electronically signed by Ayo Rodriguez MD on 9/3/2020    Please note that this chart was generated using dragon dictation software. Although every effort was made to ensure the accuracy of this automated transcription, some errors in transcription may have occurred.

## 2020-09-04 LAB
ESTIMATED AVERAGE GLUCOSE: 142.7 MG/DL
HBA1C MFR BLD: 6.6 %

## 2020-10-01 ENCOUNTER — OFFICE VISIT (OUTPATIENT)
Dept: FAMILY MEDICINE CLINIC | Age: 75
End: 2020-10-01
Payer: MEDICARE

## 2020-10-01 VITALS
WEIGHT: 192.4 LBS | BODY MASS INDEX: 37.77 KG/M2 | SYSTOLIC BLOOD PRESSURE: 118 MMHG | TEMPERATURE: 97.2 F | HEART RATE: 83 BPM | OXYGEN SATURATION: 97 % | HEIGHT: 60 IN | DIASTOLIC BLOOD PRESSURE: 80 MMHG

## 2020-10-01 DIAGNOSIS — N18.30 STAGE 3 CHRONIC KIDNEY DISEASE, UNSPECIFIED WHETHER STAGE 3A OR 3B CKD (HCC): ICD-10-CM

## 2020-10-01 LAB
ANION GAP SERPL CALCULATED.3IONS-SCNC: 10 MMOL/L (ref 3–16)
BUN BLDV-MCNC: 21 MG/DL (ref 7–20)
CALCIUM SERPL-MCNC: 9.9 MG/DL (ref 8.3–10.6)
CHLORIDE BLD-SCNC: 107 MMOL/L (ref 99–110)
CO2: 26 MMOL/L (ref 21–32)
CREAT SERPL-MCNC: 1.4 MG/DL (ref 0.6–1.2)
GFR AFRICAN AMERICAN: 44
GFR NON-AFRICAN AMERICAN: 37
GLUCOSE BLD-MCNC: 149 MG/DL (ref 70–99)
POTASSIUM SERPL-SCNC: 4.6 MMOL/L (ref 3.5–5.1)
SODIUM BLD-SCNC: 143 MMOL/L (ref 136–145)

## 2020-10-01 PROCEDURE — 1036F TOBACCO NON-USER: CPT | Performed by: FAMILY MEDICINE

## 2020-10-01 PROCEDURE — 3017F COLORECTAL CA SCREEN DOC REV: CPT | Performed by: FAMILY MEDICINE

## 2020-10-01 PROCEDURE — G8427 DOCREV CUR MEDS BY ELIG CLIN: HCPCS | Performed by: FAMILY MEDICINE

## 2020-10-01 PROCEDURE — G8400 PT W/DXA NO RESULTS DOC: HCPCS | Performed by: FAMILY MEDICINE

## 2020-10-01 PROCEDURE — 4040F PNEUMOC VAC/ADMIN/RCVD: CPT | Performed by: FAMILY MEDICINE

## 2020-10-01 PROCEDURE — 1090F PRES/ABSN URINE INCON ASSESS: CPT | Performed by: FAMILY MEDICINE

## 2020-10-01 PROCEDURE — G8417 CALC BMI ABV UP PARAM F/U: HCPCS | Performed by: FAMILY MEDICINE

## 2020-10-01 PROCEDURE — 1123F ACP DISCUSS/DSCN MKR DOCD: CPT | Performed by: FAMILY MEDICINE

## 2020-10-01 PROCEDURE — 99214 OFFICE O/P EST MOD 30 MIN: CPT | Performed by: FAMILY MEDICINE

## 2020-10-01 PROCEDURE — G8484 FLU IMMUNIZE NO ADMIN: HCPCS | Performed by: FAMILY MEDICINE

## 2020-10-01 RX ORDER — LISINOPRIL 10 MG/1
10 TABLET ORAL DAILY
Qty: 90 TABLET | Refills: 1 | Status: SHIPPED | OUTPATIENT
Start: 2020-10-01 | End: 2021-04-06 | Stop reason: SDUPTHER

## 2020-10-01 RX ORDER — OCTISALATE, AVOBENZONE, HOMOSALATE, AND OCTOCRYLENE 29.4; 29.4; 49; 25.48 MG/ML; MG/ML; MG/ML; MG/ML
1 LOTION TOPICAL DAILY
COMMUNITY
End: 2021-07-26 | Stop reason: ALTCHOICE

## 2020-10-01 ASSESSMENT — ENCOUNTER SYMPTOMS
BLOOD IN STOOL: 0
SORE THROAT: 0
DIARRHEA: 1

## 2020-10-01 NOTE — PROGRESS NOTES
10/1/2020     [facility]      Chief Complaint   Patient presents with    1 Month Follow-Up     pt was in the hosp for colonitis and she saw DR Samanta Keenan and they did a short colonoscopy and put pt on align daily . Pt states that she is feeling better but is having diarreah . Pt states that she has a boil under her left  arm  that she would like looked at . ALVINO Marsh is a 75 y.o. female who presents today with the followin. Need for influenza vaccination    2. Malignant neoplasm of colon, unspecified part of colon ([address]    3. Major depressive disorder with single episode, in full remission ([facility]    4. IFG (impaired fasting glucose)    5. Essential hypertension    6. Diarrhea, unspecified type    7. Stage 3 chronic kidney disease, unspecified whether stage 3a or 3b CKD    8. Abscess of axilla      She been in the hospital since I last saw her  She had nausea vomiting diarrhea dehydration  She was hospitalized she was evaluated by GI she had a either flexible sigmoidoscopy or limited colonoscopy that showed I think ischemic or nonspecific colitis  She was put on a probiotic which she still taking now she is better she still has little bit of diarrhea  REVIEW OF SYMPTOMS    Review of Systems   Constitutional: Positive for activity change. Negative for fever and unexpected weight change. She lives with 1 relative they are taking precautions for Covid 19 no known exposure or symptoms   HENT: Negative for congestion and sore throat. Gastrointestinal: Positive for diarrhea. Negative for blood in stool.    Genitourinary:        Stable stage III kidney disease   Skin:        Painful lesion in the left axilla been present about a week  It is not as sore as it was it drained a little bit of purulent material  She has no fever   Hematological:        History carcinoma the colon  Treated successfully surgically  She did have iron deficiency anemia previously from blood loss but that has resolved Psychiatric/Behavioral: Positive for dysphoric mood.         She has a history of major depression  She is doing pretty well now she is on appropriate medicine       PAST MEDICAL HISTORY  Past Medical History:   Diagnosis Date    Abnormal EKG      see ekg report-11/4/2016-\"have appt 11/18/2016 to see Dr April Garcia to get heart clearance for surgery - 11/21/2016\"    Anemia     Chemotherapy induced diarrhea     Chronic kidney disease, stage 3     Colon Cancer Dx 1-8-19    Colonoscopy, tx surg- following with Dr Black to start chemo    DVT (deep vein thrombosis) in pregnancy     Essential hypertension     History of blood transfusion 1960's    No Reaction To Blood Transfusion Received    Hx of blood clots     left leg    Hx of colonic polyp     Hyperlipidemia     Hypertension     Impaired fasting glucose     Malignant neoplasm of colon ([address]     PONV (postoperative nausea and vomiting)     denies hx of motion sickness    Shortness of breath on exertion     Teeth missing     Upper And Lower    UTI (urinary tract infection) In Past    No Current Symptoms    Wears dentures     Full Upper, Partial Lower    Wears glasses     Wears partial dentures     Lower       FAMILY HISTORY  Family History   Problem Relation Age of Onset    Diabetes Mother     Cancer Mother         \"Brain Cancer\"    Tuberculosis Father     Diabetes Sister     COPD Sister     Cancer Brother         Lung Cancer    Early Death Brother 61        Lung Cancer    Early Death Daughter 47        Lung Cancer    Cancer Daughter         Lung Cancer    Cancer Daughter         Breast Cancer    Breast Cancer Daughter     Early Death Daughter 52        Breast Cancer       SOCIAL HISTORY  Social History     Socioeconomic History    Marital status:      Spouse name: None    Number of children: None    Years of education: None    Highest education level: None   Occupational History    None   Social Needs    Financial resource strain: None    Food insecurity     Worry: None     Inability: None    Transportation needs     Medical: None     Non-medical: None   Tobacco Use    Smoking status: Never Smoker    Smokeless tobacco: Never Used   Substance and Sexual Activity    Alcohol use: No    Drug use: No    Sexual activity: Never   Lifestyle    Physical activity     Days per week: None     Minutes per session: None    Stress: None   Relationships    Social connections     Talks on phone: None     Gets together: None     Attends Protestant service: None     Active member of club or organization: None     Attends meetings of clubs or organizations: None     Relationship status: None    Intimate partner violence     Fear of current or ex partner: None     Emotionally abused: None     Physically abused: None     Forced sexual activity: None   Other Topics Concern    None   Social History Narrative    None        SURGICAL HISTORY  Past Surgical History:   Procedure Laterality Date    APPENDECTOMY  1960    BACK SURGERY  2012    ACF C4, C5 With Hardware    BLADDER SUSPENSION  2002    Done With Vaginal Hysterectomy    CHOLECYSTECTOMY, LAPAROSCOPIC  11/21/2016    COLONOSCOPY  7-17-13    Polyps x2, Internal hemorrhoids    COLONOSCOPY  01/08/2019    Ulcerated mass in sigmoid at 15 cm, Internal grade 1 hemorrhoids, otherwise normal to 40cm    COLONOSCOPY  10/17/2019    grade 1 int hem, s/p sigmoid resection, repeat in 3 years    COLONOSCOPY N/A 10/17/2019    COLORECTAL CANCER SCREENING, HIGH RISK performed by Abril Gongora MD at [address] N/A 8/12/2020    COLONOSCOPY POLYPECTOMY SNARE/COLD BIOPSY performed by Abril Gongora MD at [address] [place] [address] Left 2016    Broken Left Wrist With Hardware    HYSTERECTOMY VAGINAL  2002    Bladder Suspension Also Done    INSERTION / REMOVAL / REPLACEMENT VENOUS ACCESS CATHETER Right 2/20/2019    PORT INSERTION performed by Angie Hernandez MD at [address]  SIGMOIDOSCOPY N/A 1/8/2019    SIGMOIDOSCOPY BIOPSY FLEXIBLE with tattoo performed by Vahid Whaley MD at [facility] N/A 1/16/2019    BOWEL RESECTION SIGMOID performed by Ferna Homans, MD at [facility]  Late 1970's    TUNNELED VENOUS PORT PLACEMENT  02/20/2019       CURRENT MEDICATIONS  Current Outpatient Medications   Medication Sig Dispense Refill    Probiotic Product (ALIGN) 4 MG CAPS Take 1 capsule by mouth daily      lisinopril (PRINIVIL;ZESTRIL) 10 MG tablet Take 1 tablet by mouth daily 90 tablet 1    loperamide (IMODIUM) 2 MG capsule Take 1 capsule by mouth 4 times daily as needed for Diarrhea 120 capsule 0    sertraline (ZOLOFT) 100 MG tablet Take 1 tablet by mouth every morning 90 tablet 1    famotidine (PEPCID) 20 MG tablet TAKE 1 TABLET BY MOUTH TWICE DAILY 180 tablet 1    Blood Pressure Monitoring (BLOOD PRESSURE KIT) JOSE DAVID Use as directed 1 Device 0    ondansetron (ZOFRAN) 8 MG tablet TK 1 T PO Q 8 H PRF NAUSEA OR VOM 30 tablet 0    atorvastatin (LIPITOR) 40 MG tablet Take 1 tablet by mouth nightly 90 tablet 1    Acetaminophen (TYLENOL EX ST ARTHRITIS PAIN PO) Take by mouth daily as needed TAKE PRN       No current facility-administered medications for this visit. ALLERGIES  Allergies   Allergen Reactions    Neosporin [Neomycin-Polymyx-Gramicid] Swelling       PHYSICAL EXAM    /80   Pulse 83   Temp 97.2 °F (36.2 °C)   Ht 5' (1.524 m)   Wt 192 lb 6.4 oz (87.3 kg)   SpO2 97%   BMI 37.58 kg/m²     Physical Exam  Constitutional:       General: She is not in acute distress. Appearance: Normal appearance. HENT:      Right Ear: External ear normal.      Left Ear: External ear normal.   Eyes:      General:         Right eye: No discharge. Conjunctiva/sclera: Conjunctivae normal.   Cardiovascular:      Rate and Rhythm: Normal rate. Pulmonary:      Effort: Pulmonary effort is normal. No respiratory distress. Skin:     Comments: Small area of folliculitis left axilla  There is no fluctuant abscess  There is an open area  This does not need I&D nor does it need antibiotics   Neurological:      General: No focal deficit present. Mental Status: She is alert. Mental status is at baseline. Psychiatric:         Mood and Affect: Mood normal.         Thought Content: Thought content normal.     I reviewed her hospital reports  She was taken off of Maxide and lisinopril while in the hospital because then she was dehydrated and her renal function was down    ASSESSMENT and PLAN    Giancarlo Monday was seen today for 1 month follow-up. Diagnoses and all orders for this visit:    Need for influenza vaccination  -     Influenza, Quadv, Adjunanted,, 65 yrs+ , IM, PF, Prefill syr, 0.5mL (FLUAD)    Malignant neoplasm of colon, unspecified part of colon ([address]    Major depressive disorder with single episode, in full remission ([address]    IFG (impaired fasting glucose)    Essential hypertension    Diarrhea, unspecified type    Stage 3 chronic kidney disease, unspecified whether stage 3a or 3b CKD    Abscess of axilla    Other orders  -     lisinopril (PRINIVIL;ZESTRIL) 10 MG tablet; Take 1 tablet by mouth daily    No antibiotics for the skin infection because of the risk for C. difficile colitis  She is to treat this with heat locally contact me if he gets worse  Continue her probiotic  Continue her usual medicines and restart lisinopril 10 mg daily  Stay off the diuretic for now  Follow-up in 3 months    Return in about 3 months (around 1/1/2021). Electronically signed by Clifton Tripathi MD on 10/1/2020    Please note that this chart was generated using dragon dictation software. Although every effort was made to ensure the accuracy of this automated transcription, some errors in transcription may have occurred.

## 2020-10-02 PROCEDURE — G0008 ADMIN INFLUENZA VIRUS VAC: HCPCS | Performed by: FAMILY MEDICINE

## 2020-10-02 PROCEDURE — 90694 VACC AIIV4 NO PRSRV 0.5ML IM: CPT | Performed by: FAMILY MEDICINE

## 2020-11-03 PROBLEM — I10 HYPERTENSION: Status: RESOLVED | Noted: 2020-11-03 | Resolved: 2020-11-03

## 2020-11-30 RX ORDER — ATORVASTATIN CALCIUM 40 MG/1
40 TABLET, FILM COATED ORAL NIGHTLY
Qty: 90 TABLET | Refills: 1 | OUTPATIENT
Start: 2020-11-30

## 2020-12-11 ENCOUNTER — HOSPITAL ENCOUNTER (OUTPATIENT)
Dept: CT IMAGING | Age: 75
Discharge: HOME | End: 2020-12-11
Payer: MEDICARE

## 2020-12-11 PROCEDURE — 74176 CT ABD & PELVIS W/O CONTRAST: CPT

## 2020-12-11 PROCEDURE — 71250 CT THORAX DX C-: CPT

## 2020-12-11 PROCEDURE — 6360000004 HC RX CONTRAST MEDICATION: Performed by: INTERNAL MEDICINE

## 2020-12-11 RX ADMIN — IOHEXOL 50 ML: 240 INJECTION, SOLUTION INTRATHECAL; INTRAVASCULAR; INTRAVENOUS; ORAL at 10:00

## 2021-01-08 ENCOUNTER — OFFICE VISIT (OUTPATIENT)
Dept: FAMILY MEDICINE CLINIC | Age: 76
End: 2021-01-08
Payer: MEDICARE

## 2021-01-08 VITALS
TEMPERATURE: 97.6 F | DIASTOLIC BLOOD PRESSURE: 82 MMHG | BODY MASS INDEX: 37.58 KG/M2 | OXYGEN SATURATION: 98 % | WEIGHT: 191.4 LBS | SYSTOLIC BLOOD PRESSURE: 132 MMHG | HEIGHT: 60 IN | HEART RATE: 72 BPM

## 2021-01-08 DIAGNOSIS — C18.9 MALIGNANT NEOPLASM OF COLON, UNSPECIFIED PART OF COLON (HCC): ICD-10-CM

## 2021-01-08 DIAGNOSIS — C18.7 CANCER OF SIGMOID COLON (HCC): ICD-10-CM

## 2021-01-08 DIAGNOSIS — E61.1 IRON DEFICIENCY: Chronic | ICD-10-CM

## 2021-01-08 DIAGNOSIS — Z78.0 POST-MENOPAUSAL: ICD-10-CM

## 2021-01-08 DIAGNOSIS — Z23 NEED FOR PROPHYLACTIC VACCINATION AND INOCULATION AGAINST VARICELLA: ICD-10-CM

## 2021-01-08 DIAGNOSIS — I10 ESSENTIAL HYPERTENSION: Primary | ICD-10-CM

## 2021-01-08 DIAGNOSIS — N18.30 STAGE 3 CHRONIC KIDNEY DISEASE, UNSPECIFIED WHETHER STAGE 3A OR 3B CKD (HCC): ICD-10-CM

## 2021-01-08 DIAGNOSIS — R73.01 IFG (IMPAIRED FASTING GLUCOSE): ICD-10-CM

## 2021-01-08 DIAGNOSIS — F32.5 MAJOR DEPRESSIVE DISORDER WITH SINGLE EPISODE, IN FULL REMISSION (HCC): Chronic | ICD-10-CM

## 2021-01-08 PROCEDURE — 1123F ACP DISCUSS/DSCN MKR DOCD: CPT | Performed by: FAMILY MEDICINE

## 2021-01-08 PROCEDURE — 1036F TOBACCO NON-USER: CPT | Performed by: FAMILY MEDICINE

## 2021-01-08 PROCEDURE — 1090F PRES/ABSN URINE INCON ASSESS: CPT | Performed by: FAMILY MEDICINE

## 2021-01-08 PROCEDURE — G8484 FLU IMMUNIZE NO ADMIN: HCPCS | Performed by: FAMILY MEDICINE

## 2021-01-08 PROCEDURE — 4040F PNEUMOC VAC/ADMIN/RCVD: CPT | Performed by: FAMILY MEDICINE

## 2021-01-08 PROCEDURE — G8427 DOCREV CUR MEDS BY ELIG CLIN: HCPCS | Performed by: FAMILY MEDICINE

## 2021-01-08 PROCEDURE — G8417 CALC BMI ABV UP PARAM F/U: HCPCS | Performed by: FAMILY MEDICINE

## 2021-01-08 PROCEDURE — G8400 PT W/DXA NO RESULTS DOC: HCPCS | Performed by: FAMILY MEDICINE

## 2021-01-08 PROCEDURE — 99214 OFFICE O/P EST MOD 30 MIN: CPT | Performed by: FAMILY MEDICINE

## 2021-01-08 RX ORDER — ATORVASTATIN CALCIUM 40 MG/1
40 TABLET, FILM COATED ORAL NIGHTLY
Qty: 90 TABLET | Refills: 1 | Status: SHIPPED | OUTPATIENT
Start: 2021-01-08 | End: 2021-07-26 | Stop reason: SDUPTHER

## 2021-01-08 RX ORDER — FERROUS SULFATE 325(65) MG
325 TABLET ORAL DAILY
COMMUNITY
End: 2021-07-26 | Stop reason: ALTCHOICE

## 2021-01-08 RX ORDER — ZINC GLUCONATE 50 MG
50 TABLET ORAL DAILY
COMMUNITY
End: 2021-07-26 | Stop reason: ALTCHOICE

## 2021-01-08 RX ORDER — MULTIVIT WITH MINERALS/LUTEIN
250 TABLET ORAL DAILY
COMMUNITY
End: 2021-07-26 | Stop reason: ALTCHOICE

## 2021-01-08 RX ORDER — ACETAMINOPHEN 160 MG
1 TABLET,DISINTEGRATING ORAL DAILY
COMMUNITY
End: 2021-07-26 | Stop reason: ALTCHOICE

## 2021-01-08 ASSESSMENT — ENCOUNTER SYMPTOMS
COUGH: 0
SHORTNESS OF BREATH: 0

## 2021-01-08 NOTE — PROGRESS NOTES
surgery - 11/21/2016\"    Anemia     Chemotherapy induced diarrhea     Chronic kidney disease, stage 3     Colon Cancer Dx 1-8-19    Colonoscopy, tx surg- following with Dr Black to start chemo    DVT (deep vein thrombosis) in pregnancy     Essential hypertension     History of blood transfusion 1960's    No Reaction To Blood Transfusion Received    Hx of blood clots     left leg    Hx of colonic polyp     Hyperlipidemia     Hypertension     Impaired fasting glucose     Malignant neoplasm of colon (HCC)     PONV (postoperative nausea and vomiting)     denies hx of motion sickness    Shortness of breath on exertion     Teeth missing     Upper And Lower    UTI (urinary tract infection) In Past    No Current Symptoms    Wears dentures     Full Upper, Partial Lower    Wears glasses     Wears partial dentures     Lower       FAMILY HISTORY  Family History   Problem Relation Age of Onset    Diabetes Mother     Cancer Mother         \"Brain Cancer\"    Tuberculosis Father     Diabetes Sister     COPD Sister     Cancer Brother         Lung Cancer    Early Death Brother 61        Lung Cancer    Early Death Daughter 47        Lung Cancer    Cancer Daughter         Lung Cancer    Cancer Daughter         Breast Cancer    Breast Cancer Daughter     Early Death Daughter 52        Breast Cancer       SOCIAL HISTORY  Social History     Socioeconomic History    Marital status:      Spouse name: None    Number of children: None    Years of education: None    Highest education level: None   Occupational History    None   Social Needs    Financial resource strain: None    Food insecurity     Worry: None     Inability: None    Transportation needs     Medical: None     Non-medical: None   Tobacco Use    Smoking status: Never Smoker    Smokeless tobacco: Never Used   Substance and Sexual Activity    Alcohol use: No    Drug use: No    Sexual activity: Never   Lifestyle    Physical activity Days per week: None     Minutes per session: None    Stress: None   Relationships    Social connections     Talks on phone: None     Gets together: None     Attends Holiness service: None     Active member of club or organization: None     Attends meetings of clubs or organizations: None     Relationship status: None    Intimate partner violence     Fear of current or ex partner: None     Emotionally abused: None     Physically abused: None     Forced sexual activity: None   Other Topics Concern    None   Social History Narrative    None        SURGICAL HISTORY  Past Surgical History:   Procedure Laterality Date    APPENDECTOMY  1960    BACK SURGERY  2012    ACF C4, C5 With Hardware    BLADDER SUSPENSION  2002    Done With Vaginal Hysterectomy    CHOLECYSTECTOMY, LAPAROSCOPIC  11/21/2016    COLONOSCOPY  7-17-13    Polyps x2, Internal hemorrhoids    COLONOSCOPY  01/08/2019    Ulcerated mass in sigmoid at 15 cm, Internal grade 1 hemorrhoids, otherwise normal to 40cm    COLONOSCOPY  10/17/2019    grade 1 int hem, s/p sigmoid resection, repeat in 3 years    COLONOSCOPY N/A 10/17/2019    COLORECTAL CANCER SCREENING, HIGH RISK performed by Morro Adler MD at [address] [place] [address] N/A 8/12/2020    COLONOSCOPY POLYPECTOMY SNARE/COLD BIOPSY performed by Morro Adler MD at [address] Left 2016    Broken Left Wrist With Hardware    HYSTERECTOMY VAGINAL  2002    Bladder Suspension Also Done    INSERTION / REMOVAL / REPLACEMENT VENOUS ACCESS CATHETER Right 2/20/2019    PORT INSERTION performed by Chery Romero MD at [address] 1/8/2019    [address] with tattoo performed by Morro Adler MD at [place] [address] N/A 1/16/2019    BOWEL RESECTION SIGMOID performed by Chery Romero MD at [address] [place] [address]  Late 1970's    TUNNELED VENOUS PORT PLACEMENT  02/20/2019       CURRENT and all orders for this visit:    Essential hypertension    Post-menopausal    Need for prophylactic vaccination and inoculation against varicella    Cancer of sigmoid colon ([facility]    Major depressive disorder with single episode, in full remission ([facility]    Iron deficiency    Malignant neoplasm of colon, unspecified part of colon ([facility]    Stage 3 chronic kidney disease, unspecified whether stage 3a or 3b CKD    IFG (impaired fasting glucose)    Other orders  -     atorvastatin (LIPITOR) 40 MG tablet; Take 1 tablet by mouth nightly    She is currently stable  Would continue same medicine through this office  Follow-up here in 6 months  Continue follow-up with oncology  Get Covid vaccine when it becomes available    Return in about 6 months (around 7/8/2021). Electronically signed by Billie Stone MD on 1/8/2021    Please note that this chart was generated using dragon dictation software. Although every effort was made to ensure the accuracy of this automated transcription, some errors in transcription may have occurred.

## 2021-02-24 RX ORDER — SERTRALINE HYDROCHLORIDE 100 MG/1
100 TABLET, FILM COATED ORAL EVERY MORNING
Qty: 90 TABLET | Refills: 1 | Status: SHIPPED | OUTPATIENT
Start: 2021-02-24 | End: 2021-07-26 | Stop reason: SDUPTHER

## 2021-04-06 RX ORDER — LISINOPRIL 10 MG/1
10 TABLET ORAL DAILY
Qty: 90 TABLET | Refills: 1 | Status: SHIPPED | OUTPATIENT
Start: 2021-04-06 | End: 2021-07-26 | Stop reason: SDUPTHER

## 2021-06-14 ENCOUNTER — HOSPITAL ENCOUNTER (OUTPATIENT)
Dept: CT IMAGING | Age: 76
Discharge: HOME | End: 2021-06-14
Payer: MEDICARE

## 2021-06-14 DIAGNOSIS — C18.7 MALIGNANT NEOPLASM OF SIGMOID COLON (HCC): ICD-10-CM

## 2021-06-14 LAB
GFR AFRICAN AMERICAN: 34 ML/MIN/1.73M2
GFR NON-AFRICAN AMERICAN: 28 ML/MIN/1.73M2
POC CREATININE: 1.8 MG/DL (ref 0.6–1.1)

## 2021-06-14 PROCEDURE — 71250 CT THORAX DX C-: CPT

## 2021-06-14 PROCEDURE — 6360000004 HC RX CONTRAST MEDICATION: Performed by: INTERNAL MEDICINE

## 2021-06-14 PROCEDURE — 74176 CT ABD & PELVIS W/O CONTRAST: CPT

## 2021-06-14 RX ADMIN — IOHEXOL 50 ML: 240 INJECTION, SOLUTION INTRATHECAL; INTRAVASCULAR; INTRAVENOUS; ORAL at 08:50

## 2021-06-21 LAB
A/G RATIO: 1.5 RATIO (ref 0.8–2.6)
ALBUMIN SERUM: 4.3 G/DL (ref 3.5–5.2)
ALP BLD-CCNC: 92 U/L (ref 23–144)
ALT SERPL-CCNC: 14 U/L (ref 0–60)
AST SERPL-CCNC: 17 U/L (ref 0–55)
BASINOPHIL, AUTOMATED: 0.5 % (ref 0–2)
BASOPHILS ABSOLUTE: 0 K/UL (ref 0–0.3)
BILIRUB SERPL-MCNC: 0.3 MG/DL (ref 0–1.2)
BUN / CREAT RATIO: 21 (ref 7–25)
BUN BLDV-MCNC: 46 MG/DL (ref 3–29)
CALCIUM SERPL-MCNC: 10.4 MG/DL (ref 8.5–10.5)
CHLORIDE BLD-SCNC: 104 MEQ/L (ref 96–110)
CO2: 21 MEQ/L (ref 19–32)
CREAT SERPL-MCNC: 2.2 MG/DL (ref 0.5–1.2)
DIFFERENTIAL: ABNORMAL
EOSINOPHILS ABSOLUTE: 0.1 K/UL (ref 0–0.6)
EOSINOPHILS RELATIVE PERCENT: 1.2 % (ref 0–7)
FASTING STATUS: ABNORMAL
GFR AFRICAN AMERICAN: 24 MLS/MIN/1.73M2
GFR NON-AFRICAN AMERICAN: 21 MLS/MIN/1.73M2
GLOBULIN: 2.8 G/DL (ref 1.9–3.6)
GLUCOSE BLD-MCNC: 156 MG/DL (ref 70–99)
HCT VFR BLD CALC: 39 % (ref 35–46)
HEMOGLOBIN: 12.7 G/DL (ref 12–15.6)
IMMATURE GRANS (ABS): 0 K/UL
IMMATURE GRANULOCYTES: 0.3 %
LYMPHOCYTE %: 14.5 % (ref 14–51)
LYMPHOCYTES ABSOLUTE: 0.9 K/UL (ref 0.9–4.1)
MCH RBC QN AUTO: 29.8 PG (ref 27–33)
MCHC RBC AUTO-ENTMCNC: 32.6 G/DL (ref 32–36)
MCV RBC AUTO: 91.5 FL (ref 80–100)
MONOCYTES ABSOLUTE: 0.4 K/UL (ref 0.2–1.1)
MONOCYTES RELATIVE PERCENT: 6.9 % (ref 0–14)
NEUTROPHILS ABSOLUTE: 4.9 K/UL (ref 1.5–7.8)
PDW BLD-RTO: 14 % (ref 9–15)
PLATELET # BLD: 178 K/UL (ref 130–400)
PMV BLD AUTO: 10.2 FL (ref 7.5–11.6)
POTASSIUM SERPL-SCNC: 5 MEQ/L (ref 3.4–5.3)
RBC # BLD: 4.26 M/UL (ref 3.9–5.2)
SEGMENTED NEUTROPHILS RELATIVE PERCENT: 76.6 % (ref 40–76)
SODIUM: 137 MEQ/L (ref 135–148)
TOTAL PROTEIN: 7.1 G/DL (ref 6–8.3)
WBC # BLD: 6.4 K/UL (ref 3.8–10.8)

## 2021-07-26 ENCOUNTER — OFFICE VISIT (OUTPATIENT)
Dept: FAMILY MEDICINE CLINIC | Age: 76
End: 2021-07-26
Payer: MEDICARE

## 2021-07-26 VITALS
DIASTOLIC BLOOD PRESSURE: 64 MMHG | WEIGHT: 194 LBS | OXYGEN SATURATION: 97 % | HEIGHT: 60 IN | BODY MASS INDEX: 38.09 KG/M2 | HEART RATE: 80 BPM | SYSTOLIC BLOOD PRESSURE: 116 MMHG

## 2021-07-26 DIAGNOSIS — I10 ESSENTIAL HYPERTENSION: ICD-10-CM

## 2021-07-26 DIAGNOSIS — R73.01 IFG (IMPAIRED FASTING GLUCOSE): ICD-10-CM

## 2021-07-26 DIAGNOSIS — N18.4 CHRONIC KIDNEY DISEASE, STAGE IV (SEVERE) (HCC): ICD-10-CM

## 2021-07-26 DIAGNOSIS — Z12.31 ENCOUNTER FOR SCREENING MAMMOGRAM FOR BREAST CANCER: Primary | ICD-10-CM

## 2021-07-26 LAB
CHOLESTEROL, TOTAL: 175 MG/DL (ref 0–199)
HDLC SERPL-MCNC: 45 MG/DL (ref 40–60)
LDL CHOLESTEROL CALCULATED: 101 MG/DL
TRIGL SERPL-MCNC: 143 MG/DL (ref 0–150)
VLDLC SERPL CALC-MCNC: 29 MG/DL

## 2021-07-26 PROCEDURE — 1036F TOBACCO NON-USER: CPT | Performed by: FAMILY MEDICINE

## 2021-07-26 PROCEDURE — G8400 PT W/DXA NO RESULTS DOC: HCPCS | Performed by: FAMILY MEDICINE

## 2021-07-26 PROCEDURE — 1123F ACP DISCUSS/DSCN MKR DOCD: CPT | Performed by: FAMILY MEDICINE

## 2021-07-26 PROCEDURE — 3288F FALL RISK ASSESSMENT DOCD: CPT | Performed by: FAMILY MEDICINE

## 2021-07-26 PROCEDURE — 99214 OFFICE O/P EST MOD 30 MIN: CPT | Performed by: FAMILY MEDICINE

## 2021-07-26 PROCEDURE — G8417 CALC BMI ABV UP PARAM F/U: HCPCS | Performed by: FAMILY MEDICINE

## 2021-07-26 PROCEDURE — 4040F PNEUMOC VAC/ADMIN/RCVD: CPT | Performed by: FAMILY MEDICINE

## 2021-07-26 PROCEDURE — 1090F PRES/ABSN URINE INCON ASSESS: CPT | Performed by: FAMILY MEDICINE

## 2021-07-26 PROCEDURE — G8427 DOCREV CUR MEDS BY ELIG CLIN: HCPCS | Performed by: FAMILY MEDICINE

## 2021-07-26 RX ORDER — LISINOPRIL 10 MG/1
10 TABLET ORAL DAILY
Qty: 90 TABLET | Refills: 1 | Status: SHIPPED | OUTPATIENT
Start: 2021-07-26 | End: 2021-08-30 | Stop reason: ALTCHOICE

## 2021-07-26 RX ORDER — SERTRALINE HYDROCHLORIDE 100 MG/1
100 TABLET, FILM COATED ORAL EVERY MORNING
Qty: 90 TABLET | Refills: 1 | Status: SHIPPED | OUTPATIENT
Start: 2021-07-26 | End: 2022-01-24 | Stop reason: SDUPTHER

## 2021-07-26 RX ORDER — LOPERAMIDE HYDROCHLORIDE 2 MG/1
2 CAPSULE ORAL PRN
COMMUNITY
End: 2021-07-26

## 2021-07-26 RX ORDER — FAMOTIDINE 20 MG/1
TABLET, FILM COATED ORAL
Qty: 180 TABLET | Refills: 1 | Status: SHIPPED | OUTPATIENT
Start: 2021-07-26 | End: 2022-01-24 | Stop reason: SDUPTHER

## 2021-07-26 RX ORDER — ATORVASTATIN CALCIUM 40 MG/1
40 TABLET, FILM COATED ORAL NIGHTLY
Qty: 90 TABLET | Refills: 1 | Status: SHIPPED | OUTPATIENT
Start: 2021-07-26 | End: 2022-01-24 | Stop reason: SDUPTHER

## 2021-07-26 RX ORDER — ONDANSETRON HYDROCHLORIDE 8 MG/1
TABLET, FILM COATED ORAL
Qty: 30 TABLET | Refills: 1 | Status: SHIPPED | OUTPATIENT
Start: 2021-07-26 | End: 2022-01-24 | Stop reason: SDUPTHER

## 2021-07-26 RX ORDER — LOPERAMIDE HYDROCHLORIDE 2 MG/1
2 CAPSULE ORAL 4 TIMES DAILY PRN
Qty: 120 CAPSULE | Refills: 0 | Status: SHIPPED | OUTPATIENT
Start: 2021-07-26 | End: 2021-08-25

## 2021-07-26 NOTE — PROGRESS NOTES
2021     Gerardo Duffy      Chief Complaint   Patient presents with    6 Month Follow-Up    Other     pt reports no concerns       HPI      Alanna Kendall is a 67 y.o. female who presents today with the followin. Encounter for screening mammogram for breast cancer    2. Essential hypertension    3. Chronic kidney disease, stage IV (severe) ([facility]    4.  IFG (impaired fasting glucose)      6-month routine follow-up  She is in remission from colon cancer she had surgery and chemotherapy  REVIEW OF SYMPTOMS    Review of Systems   Endocrine:        Impaired fasting glucose  She try to watch her diet   Genitourinary:        History of chronic kidney disease  GFR has been dropping  Not really sure the etiology of this  She not diabetic she had chemotherapy I think it came on about the time she was treated for colon cancer but it is not improved   Hematological:        Previously on iron for iron deficiency but this was due to colon cancer is now resolved she needs no iron   Psychiatric/Behavioral:        History of depression doing well on SSRI wants to stay on same dose       PAST MEDICAL HISTORY  Past Medical History:   Diagnosis Date    Abnormal EKG      see ekg report-2016-\"have appt 2016 to see Dr Luis Felipe Neves to get heart clearance for surgery - 2016\"    Anemia     Chemotherapy induced diarrhea     Chronic kidney disease, stage 3 ([facility]     Colon Cancer Dx 19    Colonoscopy, tx surg- following with Dr Black to start chemo    DVT (deep vein thrombosis) in pregnancy     Essential hypertension     History of blood transfusion     No Reaction To Blood Transfusion Received    Hx of blood clots     left leg    Hx of colonic polyp     Hyperlipidemia     Hypertension     Impaired fasting glucose     Malignant neoplasm of colon (HCC)     PONV (postoperative nausea and vomiting)     denies hx of motion sickness    Shortness of breath on exertion     Teeth missing     Upper And Lower    UTI (urinary tract infection) In Past    No Current Symptoms    Wears dentures     Full Upper, Partial Lower    Wears glasses     Wears partial dentures     Lower       FAMILY HISTORY  Family History   Problem Relation Age of Onset    Diabetes Mother     Cancer Mother         \"Brain Cancer\"    Tuberculosis Father     Diabetes Sister     COPD Sister     Cancer Brother         Lung Cancer    Early Death Brother 61        Lung Cancer    Early Death Daughter 47        Lung Cancer    Cancer Daughter         Lung Cancer    Cancer Daughter         Breast Cancer    Breast Cancer Daughter     Early Death Daughter 52        Breast Cancer       SOCIAL HISTORY  Social History     Socioeconomic History    Marital status:      Spouse name: None    Number of children: None    Years of education: None    Highest education level: None   Occupational History    None   Tobacco Use    Smoking status: Never Smoker    Smokeless tobacco: Never Used   Vaping Use    Vaping Use: Never used   Substance and Sexual Activity    Alcohol use: No    Drug use: No    Sexual activity: Never   Other Topics Concern    None   Social History Narrative    None     Social Determinants of Health     Financial Resource Strain:     Difficulty of Paying Living Expenses:    Food Insecurity:     Worried About Running Out of Food in the Last Year:     Ran Out of Food in the Last Year:    Transportation Needs:     Lack of Transportation (Medical):      Lack of Transportation (Non-Medical):    Physical Activity:     Days of Exercise per Week:     Minutes of Exercise per Session:    Stress:     Feeling of Stress :    Social Connections:     Frequency of Communication with Friends and Family:     Frequency of Social Gatherings with Friends and Family:     Attends [employer] Services:     Active Member of Clubs or Organizations:     Attends Club or Organization Meetings:     Marital Status:    Intimate Partner Violence:  Fear of Current or Ex-Partner:     Emotionally Abused:     Physically Abused:     Sexually Abused:         SURGICAL HISTORY  Past Surgical History:   Procedure Laterality Date   [address]    BACK SURGERY  2012    ACF C4, C5 With Hardware    BLADDER SUSPENSION  2002    Done With Vaginal Hysterectomy    CHOLECYSTECTOMY, LAPAROSCOPIC  11/21/2016    COLONOSCOPY  7-17-13    Polyps x2, Internal hemorrhoids    COLONOSCOPY  01/08/2019    Ulcerated mass in sigmoid at 15 cm, Internal grade 1 hemorrhoids, otherwise normal to 40cm    COLONOSCOPY  10/17/2019    grade 1 int hem, s/p sigmoid resection, repeat in 3 years    COLONOSCOPY N/A 10/17/2019    COLORECTAL CANCER SCREENING, HIGH RISK performed by Lizzeth Harrison MD at [address] N/A 8/12/2020    COLONOSCOPY POLYPECTOMY SNARE/COLD BIOPSY performed by Lizzeth Harrison MD at [address] Left 2016    Broken Left Wrist With Hardware    HYSTERECTOMY VAGINAL  2002    Bladder Suspension Also Done    INSERTION / REMOVAL / REPLACEMENT VENOUS ACCESS CATHETER Right 2/20/2019    PORT INSERTION performed by Debo Thornton MD at [address] 1/8/2019    [facility] with tattoo performed by Lizzeth Harrison MD at [place] [address] N/A 1/16/2019    BOWEL RESECTION SIGMOID performed by Debo Thornton MD at [facility]  Late 1970's    TUNNELED VENOUS PORT PLACEMENT  02/20/2019       CURRENT MEDICATIONS  Current Outpatient Medications   Medication Sig Dispense Refill    sertraline (ZOLOFT) 100 MG tablet Take 1 tablet by mouth every morning 90 tablet 1    atorvastatin (LIPITOR) 40 MG tablet Take 1 tablet by mouth nightly 90 tablet 1    ondansetron (ZOFRAN) 8 MG tablet TK 1 T PO Q 8 H PRF NAUSEA OR VOM 30 tablet 1    lisinopril (PRINIVIL;ZESTRIL) 10 MG tablet Take 1 tablet by mouth daily 90 tablet 1    famotidine (PEPCID) 20 MG tablet TAKE 1 TABLET BY MOUTH TWICE DAILY 180 tablet 1    loperamide (IMODIUM) 2 MG capsule Take 1 capsule by mouth 4 times daily as needed for Diarrhea 120 capsule 0    Blood Pressure Monitoring (BLOOD PRESSURE KIT) JOSE DAVID Use as directed 1 Device 0    Acetaminophen (TYLENOL EX ST ARTHRITIS PAIN PO) Take by mouth daily as needed TAKE PRN       No current facility-administered medications for this visit. ALLERGIES  Allergies   Allergen Reactions    Neosporin [Neomycin-Polymyx-Gramicid] Swelling       PHYSICAL EXAM    /64   Pulse 80   Ht 5' (1.524 m)   Wt 194 lb (88 kg)   SpO2 97%   BMI 37.89 kg/m²     Physical Exam  Vitals and nursing note reviewed. Constitutional:       Appearance: Normal appearance. Cardiovascular:      Rate and Rhythm: Normal rate. Pulmonary:      Effort: Pulmonary effort is normal.     Reviewed recent labs  GFR is down to 21  Hemoglobin is normal  Chemistry profile is okay except for random elevated blood sugar  ASSESSMENT and PLAN    Jojo Guevara was seen today for 6 month follow-up and other. Diagnoses and all orders for this visit:    Encounter for screening mammogram for breast cancer  -     [facility] DIGITAL SCREEN W OR WO CAD BILATERAL; Future    Essential hypertension  -     Lipid Panel; Future    Chronic kidney disease, stage IV (severe) ([facility] [address]  -     [facility] ([facility]) - Ron Ayon MD, Nephrology, [place]    IFG (impaired fasting glucose)  -     Hemoglobin A1C; Future    Other orders  -     sertraline (ZOLOFT) 100 MG tablet; Take 1 tablet by mouth every morning  -     atorvastatin (LIPITOR) 40 MG tablet; Take 1 tablet by mouth nightly  -     ondansetron (ZOFRAN) 8 MG tablet; TK 1 T PO Q 8 H PRF NAUSEA OR VOM  -     lisinopril (PRINIVIL;ZESTRIL) 10 MG tablet; Take 1 tablet by mouth daily  -     famotidine (PEPCID) 20 MG tablet; TAKE 1 TABLET BY MOUTH TWICE DAILY  -     loperamide (IMODIUM) 2 MG capsule;  Take 1 capsule by mouth 4 times daily as needed for Diarrhea    Refer to nephrology for further evaluation of her chronic kidney disease  Continue same diet continue same medication  Follow-up in 6 months  Check labs including A1c      Return in about 6 months (around 1/26/2022). Electronically signed by Nidia Naylor MD on 7/26/2021    Please note that this chart was generated using dragon dictation software. Although every effort was made to ensure the accuracy of this automated transcription, some errors in transcription may have occurred.

## 2021-07-27 LAB
ESTIMATED AVERAGE GLUCOSE: 122.6 MG/DL
HBA1C MFR BLD: 5.9 %

## 2021-08-30 PROBLEM — E87.5 HYPERKALEMIA: Status: ACTIVE | Noted: 2021-08-30

## 2021-08-30 PROBLEM — N17.0 ACUTE RENAL FAILURE WITH TUBULAR NECROSIS (HCC): Status: ACTIVE | Noted: 2021-08-30

## 2021-08-30 PROBLEM — R31.1 BENIGN ESSENTIAL MICROSCOPIC HEMATURIA: Status: ACTIVE | Noted: 2021-08-30

## 2021-10-11 ENCOUNTER — HOSPITAL ENCOUNTER (OUTPATIENT)
Dept: ULTRASOUND IMAGING | Age: 76
Discharge: HOME | End: 2021-10-11
Payer: MEDICARE

## 2021-10-11 DIAGNOSIS — N17.0 ACUTE RENAL FAILURE WITH TUBULAR NECROSIS (HCC): ICD-10-CM

## 2021-10-11 DIAGNOSIS — C18.9 MALIGNANT NEOPLASM OF COLON, UNSPECIFIED PART OF COLON (HCC): ICD-10-CM

## 2021-10-11 DIAGNOSIS — N18.32 STAGE 3B CHRONIC KIDNEY DISEASE (HCC): ICD-10-CM

## 2021-10-11 PROCEDURE — 76775 US EXAM ABDO BACK WALL LIM: CPT

## 2021-10-15 DIAGNOSIS — I10 ESSENTIAL HYPERTENSION: ICD-10-CM

## 2021-10-15 DIAGNOSIS — N18.32 STAGE 3B CHRONIC KIDNEY DISEASE (HCC): ICD-10-CM

## 2021-10-15 DIAGNOSIS — C18.9 MALIGNANT NEOPLASM OF COLON, UNSPECIFIED PART OF COLON (HCC): ICD-10-CM

## 2021-10-15 DIAGNOSIS — E66.01 MORBIDLY OBESE (HCC): ICD-10-CM

## 2021-10-15 DIAGNOSIS — R53.83 FATIGUE, UNSPECIFIED TYPE: ICD-10-CM

## 2021-10-15 DIAGNOSIS — N17.0 ACUTE RENAL FAILURE WITH TUBULAR NECROSIS (HCC): ICD-10-CM

## 2021-10-15 DIAGNOSIS — F32.5 MAJOR DEPRESSIVE DISORDER WITH SINGLE EPISODE, IN FULL REMISSION (HCC): Chronic | ICD-10-CM

## 2021-10-15 DIAGNOSIS — R31.1 BENIGN ESSENTIAL MICROSCOPIC HEMATURIA: ICD-10-CM

## 2021-10-15 DIAGNOSIS — E87.5 HYPERKALEMIA: ICD-10-CM

## 2021-10-15 LAB
ALBUMIN SERPL-MCNC: 4.4 G/DL (ref 3.4–5)
ANION GAP SERPL CALCULATED.3IONS-SCNC: 13 MMOL/L (ref 3–16)
BASOPHILS ABSOLUTE: 0 K/UL (ref 0–0.2)
BASOPHILS RELATIVE PERCENT: 0.7 %
BUN BLDV-MCNC: 23 MG/DL (ref 7–20)
C3 COMPLEMENT: 172.9 MG/DL (ref 90–180)
C4 COMPLEMENT: 34.7 MG/DL (ref 10–40)
CALCIUM SERPL-MCNC: 10.3 MG/DL (ref 8.3–10.6)
CHLORIDE BLD-SCNC: 102 MMOL/L (ref 99–110)
CO2: 24 MMOL/L (ref 21–32)
CREAT SERPL-MCNC: 1.4 MG/DL (ref 0.6–1.2)
EOSINOPHILS ABSOLUTE: 0.2 K/UL (ref 0–0.6)
EOSINOPHILS RELATIVE PERCENT: 4 %
GFR AFRICAN AMERICAN: 44
GFR NON-AFRICAN AMERICAN: 36
GLUCOSE BLD-MCNC: 136 MG/DL (ref 70–99)
HAV IGM SER IA-ACNC: NORMAL
HCT VFR BLD CALC: 37.1 % (ref 36–48)
HEMOGLOBIN: 11.8 G/DL (ref 12–16)
HEPATITIS B CORE IGM ANTIBODY: NORMAL
HEPATITIS B SURFACE ANTIGEN INTERPRETATION: NORMAL
HEPATITIS C ANTIBODY INTERPRETATION: NORMAL
LYMPHOCYTES ABSOLUTE: 1.2 K/UL (ref 1–5.1)
LYMPHOCYTES RELATIVE PERCENT: 24.2 %
MAGNESIUM: 1.7 MG/DL (ref 1.8–2.4)
MCH RBC QN AUTO: 26.6 PG (ref 26–34)
MCHC RBC AUTO-ENTMCNC: 31.9 G/DL (ref 31–36)
MCV RBC AUTO: 83.1 FL (ref 80–100)
MONOCYTES ABSOLUTE: 0.3 K/UL (ref 0–1.3)
MONOCYTES RELATIVE PERCENT: 6.8 %
NEUTROPHILS ABSOLUTE: 3.2 K/UL (ref 1.7–7.7)
NEUTROPHILS RELATIVE PERCENT: 64.3 %
PARATHYROID HORMONE INTACT: 169.6 PG/ML (ref 14–72)
PDW BLD-RTO: 13.2 % (ref 12.4–15.4)
PHOSPHORUS: 3.1 MG/DL (ref 2.5–4.9)
PLATELET # BLD: 232 K/UL (ref 135–450)
PMV BLD AUTO: 8.4 FL (ref 5–10.5)
POTASSIUM SERPL-SCNC: 4.5 MMOL/L (ref 3.5–5.1)
RBC # BLD: 4.46 M/UL (ref 4–5.2)
SODIUM BLD-SCNC: 139 MMOL/L (ref 136–145)
TSH SERPL DL<=0.05 MIU/L-ACNC: 3.12 UIU/ML (ref 0.27–4.2)
WBC # BLD: 5 K/UL (ref 4–11)

## 2021-10-16 LAB — ANTI-NUCLEAR ANTIBODY (ANA): NEGATIVE

## 2021-10-18 DIAGNOSIS — N18.32 STAGE 3B CHRONIC KIDNEY DISEASE (HCC): ICD-10-CM

## 2021-10-18 DIAGNOSIS — C18.9 MALIGNANT NEOPLASM OF COLON, UNSPECIFIED PART OF COLON (HCC): ICD-10-CM

## 2021-10-18 DIAGNOSIS — F32.5 MAJOR DEPRESSIVE DISORDER WITH SINGLE EPISODE, IN FULL REMISSION (HCC): Chronic | ICD-10-CM

## 2021-10-18 DIAGNOSIS — R31.1 BENIGN ESSENTIAL MICROSCOPIC HEMATURIA: ICD-10-CM

## 2021-10-18 DIAGNOSIS — I10 ESSENTIAL HYPERTENSION: ICD-10-CM

## 2021-10-18 DIAGNOSIS — E87.5 HYPERKALEMIA: ICD-10-CM

## 2021-10-18 DIAGNOSIS — N17.0 ACUTE RENAL FAILURE WITH TUBULAR NECROSIS (HCC): ICD-10-CM

## 2021-10-18 DIAGNOSIS — R53.83 FATIGUE, UNSPECIFIED TYPE: ICD-10-CM

## 2021-10-18 DIAGNOSIS — E66.01 MORBIDLY OBESE (HCC): ICD-10-CM

## 2021-10-18 LAB
ALBUMIN SERPL-MCNC: 3.6 G/DL (ref 3.1–4.9)
ALPHA-1-GLOBULIN: 0.3 G/DL (ref 0.2–0.4)
ALPHA-2-GLOBULIN: 0.9 G/DL (ref 0.4–1.1)
BACTERIA: ABNORMAL /HPF
BETA GLOBULIN: 1.4 G/DL (ref 0.9–1.6)
BILIRUBIN URINE: NEGATIVE
BLOOD, URINE: NEGATIVE
CLARITY: ABNORMAL
COLOR: YELLOW
CREATININE URINE: 88.7 MG/DL (ref 28–259)
EPITHELIAL CELLS, UA: 5 /HPF (ref 0–5)
GAMMA GLOBULIN: 1.1 G/DL (ref 0.6–1.8)
GLUCOSE URINE: NEGATIVE MG/DL
HYALINE CASTS: 3 /LPF (ref 0–8)
KETONES, URINE: NEGATIVE MG/DL
LEUKOCYTE ESTERASE, URINE: ABNORMAL
MICROSCOPIC EXAMINATION: YES
NITRITE, URINE: POSITIVE
PH UA: 6.5 (ref 5–8)
PROTEIN PROTEIN: 0.01 G/DL
PROTEIN PROTEIN: 10 MG/DL
PROTEIN PROTEIN: 9 MG/DL
PROTEIN UA: NEGATIVE MG/DL
PROTEIN/CREAT RATIO: 0.1 MG/DL
RBC UA: 1 /HPF (ref 0–4)
SPE/IFE INTERPRETATION: NORMAL
SPECIFIC GRAVITY UA: 1.02 (ref 1–1.03)
TOTAL PROTEIN: 7.2 G/DL (ref 6.4–8.2)
URINE TYPE: ABNORMAL
UROBILINOGEN, URINE: 0.2 E.U./DL
WBC UA: 53 /HPF (ref 0–5)

## 2021-10-20 LAB — URINE ELECTROPHORESIS INTERP: NORMAL

## 2021-11-17 ENCOUNTER — TELEPHONE (OUTPATIENT)
Dept: PHARMACY | Facility: CLINIC | Age: 76
End: 2021-11-17

## 2021-11-17 NOTE — TELEPHONE ENCOUNTER
[facility] CLINICAL PHARMACY REVIEW: ADHERENCE REVIEW  Identified care gap per [place]; fills at [facility]: ACE/ARB and Statin adherence      Last Visit: 21    ASSESSMENT  ACE/ARB ADHERENCE  Per Insurance Records through 21 :   RX:  Lisinopril 10 mg tab  last filled on 21 for 90 day supply. Next refill due: 21. Fail Date:  21. Per Reconciled Dispense Report:  (same as above). BP Readings from Last 3 Encounters:   21 138/60   21 122/62   21 116/64     Estimated Creatinine Clearance: 34 mL/min (A) (based on SCr of 1.4 mg/dL (H)). STATIN ADHERENCE  Per Insurance Records through 21 :   RX:  Atorvastatin 40 mg tab  last filled on 10.17.21 for 90 day supply. Next refill due: 01.15.2022. Fail Date:  03.15.2022. Per Reconciled Dispense Report:  (same as above). Per Pharmacy: [facility] [phone]  RX: Atorvastatin 40 mg tab   last picked up on 10.20.21 for 90 day supply. Refills remainin  Billed through [employer]   Component Value Date    CHOL 175 2021    TRIG 143 2021    HDL 45 2021    LDLCALC 101 (H) 2021     ALT   Date Value Ref Range Status   2021 14 0 - 60 U/L Final     AST   Date Value Ref Range Status   2021 17 0 - 55 U/L Final     The 10-year ASCVD risk score (Mich Hastings, et al., 2013) is: 20%    Values used to calculate the score:      Age: 67 years      Sex: Female      Is Non- : No      Diabetic: No      Tobacco smoker: No      Systolic Blood Pressure: 405 mmHg      Is BP treated: No      HDL Cholesterol: 45 mg/dL      Total Cholesterol: 175 mg/dL     PLAN  The following are interventions that have been identified:     - Patient overdue refilling Lisinopril.     - Patient is no longer taking Lisinopril, it was discontinued 21 per nephrologist office visit note (is NOT on med list). No patient out reach planned at this time.       Future

## 2021-11-17 NOTE — TELEPHONE ENCOUNTER
For Pharmacy [address] in place:  No   Recommendation Provided To: Pharmacy: 1   Intervention Detail: Adherence Monitorin   Gap Closed?: No    Intervention Accepted By: Pharmacy: 1   Time Spent (min): 15

## 2021-12-20 ENCOUNTER — HOSPITAL ENCOUNTER (OUTPATIENT)
Dept: CT IMAGING | Age: 76
Discharge: HOME | End: 2021-12-20
Payer: MEDICARE

## 2021-12-20 DIAGNOSIS — C18.9 MALIGNANT NEOPLASM OF COLON, UNSPECIFIED PART OF COLON (HCC): ICD-10-CM

## 2021-12-20 PROCEDURE — 74176 CT ABD & PELVIS W/O CONTRAST: CPT

## 2021-12-20 PROCEDURE — 71250 CT THORAX DX C-: CPT

## 2021-12-30 ENCOUNTER — VIRTUAL VISIT (OUTPATIENT)
Dept: FAMILY MEDICINE CLINIC | Age: 76
End: 2021-12-30
Payer: MEDICARE

## 2021-12-30 DIAGNOSIS — Z00.00 ROUTINE GENERAL MEDICAL EXAMINATION AT A HEALTH CARE FACILITY: Primary | ICD-10-CM

## 2021-12-30 PROCEDURE — 4040F PNEUMOC VAC/ADMIN/RCVD: CPT | Performed by: FAMILY MEDICINE

## 2021-12-30 PROCEDURE — 1123F ACP DISCUSS/DSCN MKR DOCD: CPT | Performed by: FAMILY MEDICINE

## 2021-12-30 PROCEDURE — G0439 PPPS, SUBSEQ VISIT: HCPCS | Performed by: FAMILY MEDICINE

## 2021-12-30 SDOH — ECONOMIC STABILITY: FOOD INSECURITY: WITHIN THE PAST 12 MONTHS, THE FOOD YOU BOUGHT JUST DIDN'T LAST AND YOU DIDN'T HAVE MONEY TO GET MORE.: NEVER TRUE

## 2021-12-30 SDOH — ECONOMIC STABILITY: FOOD INSECURITY: WITHIN THE PAST 12 MONTHS, YOU WORRIED THAT YOUR FOOD WOULD RUN OUT BEFORE YOU GOT MONEY TO BUY MORE.: NEVER TRUE

## 2021-12-30 ASSESSMENT — PATIENT HEALTH QUESTIONNAIRE - PHQ9
SUM OF ALL RESPONSES TO PHQ QUESTIONS 1-9: 0
SUM OF ALL RESPONSES TO PHQ9 QUESTIONS 1 & 2: 0
SUM OF ALL RESPONSES TO PHQ QUESTIONS 1-9: 0
SUM OF ALL RESPONSES TO PHQ QUESTIONS 1-9: 0
2. FEELING DOWN, DEPRESSED OR HOPELESS: 0
1. LITTLE INTEREST OR PLEASURE IN DOING THINGS: 0

## 2021-12-30 ASSESSMENT — LIFESTYLE VARIABLES: HOW OFTEN DO YOU HAVE A DRINK CONTAINING ALCOHOL: 0

## 2021-12-30 ASSESSMENT — SOCIAL DETERMINANTS OF HEALTH (SDOH): HOW HARD IS IT FOR YOU TO PAY FOR THE VERY BASICS LIKE FOOD, HOUSING, MEDICAL CARE, AND HEATING?: NOT HARD AT ALL

## 2021-12-30 NOTE — PATIENT INSTRUCTIONS
Personalized Preventive Plan for Morales Reynoso - 12/30/2021  Medicare offers a range of preventive health benefits. Some of the tests and screenings are paid in full while other may be subject to a deductible, co-insurance, and/or copay. Some of these benefits include a comprehensive review of your medical history including lifestyle, illnesses that may run in your family, and various assessments and screenings as appropriate. After reviewing your medical record and screening and assessments performed today your provider may have ordered immunizations, labs, imaging, and/or referrals for you. A list of these orders (if applicable) as well as your Preventive Care list are included within your After Visit Summary for your review. Other Preventive Recommendations:    · A preventive eye exam performed by an eye specialist is recommended every 1-2 years to screen for glaucoma; cataracts, macular degeneration, and other eye disorders. · A preventive dental visit is recommended every 6 months. · Try to get at least 150 minutes of exercise per week or 10,000 steps per day on a pedometer . · Order or download the FREE \"Exercise & Physical Activity: Your Everyday Guide\" from [employer]. Call 8-324.767.9810 or search [employer] online. · You need 1736-5214 mg of calcium and 6157-6533 IU of vitamin D per day. It is possible to meet your calcium requirement with diet alone, but a vitamin D supplement is usually necessary to meet this goal.  · When exposed to the sun, use a sunscreen that protects against both UVA and UVB radiation with an SPF of 30 or greater. Reapply every 2 to 3 hours or after sweating, drying off with a towel, or swimming. · Always wear a seat belt when traveling in a car. Always wear a helmet when riding a bicycle or motorcycle.

## 2021-12-30 NOTE — PROGRESS NOTES
Medicare Annual Wellness Visit  Name: Marisol Patten Date: 2021   MRN: [id number] Sex: Female   Age: 67 y.o. Ethnicity: Non- / Non    : 1945 Race: White (non-)      Nile Gilmore is here for Medicare AWV    Screenings for behavioral, psychosocial and functional/safety risks, and cognitive dysfunction are all negative except as indicated below. These results, as well as other patient data from the [address] form, are documented in Flowsheets linked to this Encounter. Allergies   Allergen Reactions    Neosporin [Neomycin-Polymyx-Gramicid] Swelling       Prior to Visit Medications    Medication Sig Taking?  Authorizing Provider   dilTIAZem (CARDIZEM CD) 120 MG extended release capsule Take 2 capsules by mouth daily Yes Prudence MD Catarina   loperamide (IMODIUM) 2 MG capsule Take 2 mg by mouth 4 times daily as needed for Diarrhea Yes Historical MD Danny   sertraline (ZOLOFT) 100 MG tablet Take 1 tablet by mouth every morning Yes Shawn Ramos MD   atorvastatin (LIPITOR) 40 MG tablet Take 1 tablet by mouth nightly Yes Shawn Ramos MD   ondansetron (ZOFRAN) 8 MG tablet TK 1 T PO Q 8 H PRF NAUSEA OR VOM Yes Shawn Ramos MD   famotidine (PEPCID) 20 MG tablet TAKE 1 TABLET BY MOUTH TWICE DAILY Yes Shawn Ramos MD   Blood Pressure Monitoring (BLOOD PRESSURE KIT) JOSE DAVID Use as directed Yes COLUMBA Avila   Acetaminophen (TYLENOL EX ST ARTHRITIS PAIN PO) Take by mouth daily as needed TAKE PRN Yes Historical Provider, MD       Past Medical History:   Diagnosis Date    Abnormal EKG      see ekg report-2016-\"have appt 2016 to see Dr Nabil Weber to get heart clearance for surgery - 2016\"    Anemia     Chemotherapy induced diarrhea     Chronic kidney disease, stage 3 ([facility] [address]     Colon Cancer Dx 19    Colonoscopy, tx surg- following with Dr Black to start chemo    DVT (deep vein thrombosis) in pregnancy     Essential hypertension     History of blood transfusion 1960's    No Reaction To Blood Transfusion Received    Hx of blood clots     left leg    Hx of colonic polyp     Hyperlipidemia     Hypertension     Impaired fasting glucose     Malignant neoplasm of colon (HCC)     PONV (postoperative nausea and vomiting)     denies hx of motion sickness    Shortness of breath on exertion     Teeth missing     Upper And Lower    UTI (urinary tract infection) In Past    No Current Symptoms    Wears dentures     Full Upper, Partial Lower    Wears glasses     Wears partial dentures     Lower       Past Surgical History:   Procedure Laterality Date    APPENDECTOMY  1960    BACK SURGERY  2012    ACF C4, C5 With Hardware    BLADDER SUSPENSION  2002    Done With Vaginal Hysterectomy    CHOLECYSTECTOMY, LAPAROSCOPIC  11/21/2016    COLONOSCOPY  7-17-13    Polyps x2, Internal hemorrhoids    COLONOSCOPY  01/08/2019    Ulcerated mass in sigmoid at 15 cm, Internal grade 1 hemorrhoids, otherwise normal to 40cm    COLONOSCOPY  10/17/2019    grade 1 int hem, s/p sigmoid resection, repeat in 3 years    COLONOSCOPY N/A 10/17/2019    COLORECTAL CANCER SCREENING, HIGH RISK performed by Chau Eastman MD at [address] N/A 8/12/2020    COLONOSCOPY POLYPECTOMY SNARE/COLD BIOPSY performed by Chau Eastman MD at [address] [place] [address] Left 2016    Broken Left Wrist With Hardware    HYSTERECTOMY VAGINAL  2002    Bladder Suspension Also Done    INSERTION / REMOVAL / REPLACEMENT VENOUS ACCESS CATHETER Right 2/20/2019    PORT INSERTION performed by Alyson Pettit MD at [address] 1/8/2019    [facility] with tattoo performed by Chau Eastman MD at [place] [id number] N/A 1/16/2019    BOWEL RESECTION SIGMOID performed by Alyson Pettit MD at [facility]  Late 1970's    TUNNELED VENOUS PORT PLACEMENT  02/20/2019 Family History   Problem Relation Age of Onset    Diabetes Mother     Cancer Mother         \"Brain Cancer\"   [employer] Tuberculosis Father     Diabetes Sister     COPD Sister     Early Death Brother 61        Lung Cancer    Lung Cancer Brother     Early Death Daughter 47        Lung Cancer    Cancer Daughter         Lung Cancer    Cancer Daughter         Breast Cancer    Breast Cancer Daughter     Early Death Daughter 52        Breast Cancer       CareTeam (Including outside providers/suppliers regularly involved in providing care):   Patient Care Team:  Elliot Johansen MD as PCP - Michelle Cosby MD as PCP - [facility] Empaneled Provider    Wt Readings from Last 3 Encounters:   11/02/21 195 lb 6.4 oz (88.6 kg)   08/30/21 191 lb 9.6 oz (86.9 kg)   07/26/21 194 lb (88 kg)     There were no vitals filed for this visit. There is no height or weight on file to calculate BMI. Based upon direct observation of the patient, evaluation of cognition reveals recent and remote memory intact. Patient's complete Health Risk Assessment and screening values have been reviewed and are found in Flowsheets. The following problems were reviewed today and where indicated follow up appointments were made and/or referrals ordered. Positive Risk Factor Screenings with Interventions:          General Health and ACP:  General  In general, how would you say your health is?: Good  In the past 7 days, have you experienced any of the following?  New or Increased Pain, New or Increased Fatigue, Loneliness, Social Isolation, Stress or Anger?: None of These  Do you get the social and emotional support that you need?: Yes  Do you have a Living Will?: (!) No  Advance Directives     Power of [address] [place] [address] Will ACP-Advance Directive ACP-Power of     Not on File Not on File Not on File Not on File      General Health Risk Interventions:  · No Living Will: Advance Care Planning addressed with patient today    Health Habits/Nutrition:  Health Habits/Nutrition  Do you exercise for at least 20 minutes 2-3 times per week?: (!) No  Have you lost any weight without trying in the past 3 months?: No  Do you eat only one meal per day?: No  Have you seen the dentist within the past year?: (!) No     Health Habits/Nutrition Interventions:  · Inadequate physical activity:  patient is not ready to increase his/her physical activity level at this time  · Dental exam overdue:  patient encouraged to make appointment with his/her dentist    Hearing/Vision:  No exam data present  Hearing/Vision  Do you or your family notice any trouble with your hearing that hasn't been managed with hearing aids?: (!) Yes  Do you have difficulty driving, watching TV, or doing any of your daily activities because of your eyesight?: No  Have you had an eye exam within the past year?: (!) No  Hearing/Vision Interventions:  · Hearing concerns:  patient declines any further evaluation/treatment for hearing issues  · Vision concerns:  patient encouraged to make appointment with his/her eye specialist      Personalized Preventive Plan   Current Health Maintenance Status  Immunization History   Administered Date(s) Administered    COVID-19, Boothe Peter, PF, 30mcg/0.3mL 01/28/2021, 02/18/2021, 10/06/2021    Influenza Vaccine, unspecified formulation 12/23/2014, 01/05/2018, 12/13/2018    Influenza Virus Vaccine 12/23/2014, 01/05/2018, 12/13/2018    Influenza, High Dose (Fluzone 65 yrs and older) 12/12/2018    Influenza, Quadv, adjuvanted, 65 yrs +, IM, PF (Fluad) 10/02/2020    Influenza, Triv, inactivated, subunit, adjuvanted, IM (Fluad 65 yrs and older) 12/03/2019    Pneumococcal Conjugate 13-valent ([id number]) 06/23/2015, 12/12/2015    Pneumococcal Polysaccharide ([id number]) 03/27/2012    Tdap (Boostrix, Adacel) 03/25/2011    Zoster Live (Zostavax) 03/27/2012        Health Maintenance   Topic Date Due    DEXA (modify frequency per FRAX score)  Never done    Shingles Vaccine (2 of 3) 05/22/2012    DTaP/Tdap/Td vaccine (2 - Td or Tdap) 03/25/2021    Annual Wellness Visit (AWV)  06/04/2021    Flu vaccine (1) 09/01/2021    Lipid screen  07/26/2022    Potassium monitoring  10/15/2022    Creatinine monitoring  10/15/2022    Pneumococcal 65+ yrs at Risk Vaccine  Completed    COVID-19 Vaccine  Completed    Hepatitis C screen  Completed    Hepatitis A vaccine  Aged Out    Hepatitis B vaccine  Aged Out    Hib vaccine  Aged Out    Meningococcal (ACWY) vaccine  Aged Out     Recommendations for Yo Due: see orders and patient instructions/AVS. Discussed vaccinations due. Patient is scheduled to see PCP 1/24/2022. Unable to obtain 3 vital signs due to patient not having equipment to take blood pressure/temperature. Recommended screening schedule for the next 5-10 years is provided to the patient in written form: see Patient Instructions/AVS. Discussed DEXA, patient states she would like to discuss this with PCP at her next visit 1/24/22. This encounter was performed under my, David Barrett, direct supervision, 12/30/2021. Matt Grande LPN, [id number], performed the documented evaluation under the direct supervision of the attending physician. Samuel Chan, was evaluated through a synchronous (real-time) audio encounter. The patient (or guardian if applicable) is aware that this is a billable service. Verbal consent to proceed has been obtained within the past 12 months. The visit was conducted pursuant to the emergency declaration under the [facility], [address] [place] [address] authority and the [employer] General Act. Patient identification was verified, and a caregiver was present when appropriate. The patient was located in the state of [place], where the provider was credentialed to provide care.     Total time spent for this encounter: Not billed by time    --Anneliese Strickland LPN on 11/23/7591 at 2:25 PM    An electronic signature was used to authenticate this note.

## 2022-01-04 LAB
A/G RATIO: 2.3 RATIO (ref 0.8–2.6)
ALBUMIN SERPL-MCNC: 4.5 G/DL (ref 3.5–5.2)
ALP BLD-CCNC: 94 U/L (ref 23–144)
ALT SERPL-CCNC: 13 U/L (ref 0–60)
AST SERPL-CCNC: 16 U/L (ref 0–55)
BANDED NEUTROPHILS ABSOLUTE COUNT: 3.5 K/UL (ref 1.8–7.5)
BASOPHILS ABSOLUTE: 0 K/UL (ref 0–0.3)
BASOPHILS RELATIVE PERCENT: 0.6 % (ref 0–2)
BILIRUB SERPL-MCNC: 0.3 MG/DL (ref 0–1.2)
BUN / CREAT RATIO: 16 (ref 7–25)
BUN BLDV-MCNC: 23 MG/DL (ref 3–29)
CALCIUM SERPL-MCNC: 10 MG/DL (ref 8.5–10.5)
CHLORIDE BLD-SCNC: 103 MEQ/L (ref 96–110)
CO2: 26 MEQ/L (ref 19–32)
CREAT SERPL-MCNC: 1.4 MG/DL (ref 0.5–1.2)
DIFFERENTIAL COUNT: ABNORMAL
EOSINOPHILS ABSOLUTE: 0.2 K/UL (ref 0–0.5)
EOSINOPHILS RELATIVE PERCENT: 3.4 % (ref 0–5)
FASTING STATUS: ABNORMAL
GFR SERPL CREATININE-BSD FRML MDRD: 36 MLS/MIN/1.73M2
GLOBULIN: 2 G/DL (ref 1.9–3.6)
GLUCOSE BLD-MCNC: 150 MG/DL (ref 70–99)
GRANULOCYTE IMMATURE ABS: 0 K/UL (ref 0–0.1)
HCT VFR BLD CALC: 32.3 % (ref 34–49)
HEMOGLOBIN: 9.7 G/DL (ref 11.2–15.7)
IMMATURE GRANULOCYTES: 0.2 %
LYMPHOCYTES ABSOLUTE: 0.9 K/UL (ref 0.9–4.1)
LYMPHOCYTES RELATIVE PERCENT: 18.5 % (ref 14–51)
MCH RBC QN AUTO: 24 PG (ref 26–34)
MCHC RBC AUTO-ENTMCNC: 30 G/DL (ref 30.7–35.5)
MCV RBC AUTO: 80 FL (ref 80–100)
MONOCYTES ABSOLUTE: 0.4 K/UL (ref 0.2–1)
MONOCYTES RELATIVE PERCENT: 8.5 % (ref 4–12)
PDW BLD-RTO: 14.6 %
PLATELET # BLD: 191 K/UL (ref 140–400)
PMV BLD AUTO: 9.3 FL (ref 7.2–11.7)
POTASSIUM SERPL-SCNC: 4.3 MEQ/L (ref 3.4–5.3)
RBC # BLD: 4.04 M/UL (ref 3.95–5.26)
SEGMENTED NEUTROPHILS RELATIVE PERCENT: 68.8 % (ref 42–80)
SODIUM BLD-SCNC: 140 MEQ/L (ref 135–148)
TOTAL PROTEIN: 6.5 G/DL (ref 6–8.3)
WBC # BLD: 5 K/UL (ref 3.5–10.9)

## 2022-01-24 ENCOUNTER — OFFICE VISIT (OUTPATIENT)
Dept: FAMILY MEDICINE CLINIC | Age: 77
End: 2022-01-24
Payer: MEDICARE

## 2022-01-24 VITALS
HEART RATE: 80 BPM | BODY MASS INDEX: 39.07 KG/M2 | WEIGHT: 199 LBS | DIASTOLIC BLOOD PRESSURE: 74 MMHG | SYSTOLIC BLOOD PRESSURE: 124 MMHG | HEIGHT: 60 IN | OXYGEN SATURATION: 95 %

## 2022-01-24 DIAGNOSIS — C18.9 MALIGNANT NEOPLASM OF COLON, UNSPECIFIED PART OF COLON (HCC): Primary | ICD-10-CM

## 2022-01-24 DIAGNOSIS — I10 ESSENTIAL HYPERTENSION: ICD-10-CM

## 2022-01-24 DIAGNOSIS — R73.01 IFG (IMPAIRED FASTING GLUCOSE): ICD-10-CM

## 2022-01-24 DIAGNOSIS — E87.5 HYPERKALEMIA: ICD-10-CM

## 2022-01-24 DIAGNOSIS — N18.4 CHRONIC KIDNEY DISEASE, STAGE IV (SEVERE) (HCC): ICD-10-CM

## 2022-01-24 DIAGNOSIS — F32.5 MAJOR DEPRESSIVE DISORDER WITH SINGLE EPISODE, IN FULL REMISSION (HCC): ICD-10-CM

## 2022-01-24 DIAGNOSIS — K55.9 ISCHEMIC COLITIS (HCC): ICD-10-CM

## 2022-01-24 DIAGNOSIS — H61.21 IMPACTED CERUMEN OF RIGHT EAR: ICD-10-CM

## 2022-01-24 PROCEDURE — 1090F PRES/ABSN URINE INCON ASSESS: CPT | Performed by: FAMILY MEDICINE

## 2022-01-24 PROCEDURE — 1123F ACP DISCUSS/DSCN MKR DOCD: CPT | Performed by: FAMILY MEDICINE

## 2022-01-24 PROCEDURE — G0008 ADMIN INFLUENZA VIRUS VAC: HCPCS | Performed by: FAMILY MEDICINE

## 2022-01-24 PROCEDURE — G8417 CALC BMI ABV UP PARAM F/U: HCPCS | Performed by: FAMILY MEDICINE

## 2022-01-24 PROCEDURE — 4040F PNEUMOC VAC/ADMIN/RCVD: CPT | Performed by: FAMILY MEDICINE

## 2022-01-24 PROCEDURE — G8400 PT W/DXA NO RESULTS DOC: HCPCS | Performed by: FAMILY MEDICINE

## 2022-01-24 PROCEDURE — 90694 VACC AIIV4 NO PRSRV 0.5ML IM: CPT | Performed by: FAMILY MEDICINE

## 2022-01-24 PROCEDURE — G8427 DOCREV CUR MEDS BY ELIG CLIN: HCPCS | Performed by: FAMILY MEDICINE

## 2022-01-24 PROCEDURE — 99213 OFFICE O/P EST LOW 20 MIN: CPT | Performed by: FAMILY MEDICINE

## 2022-01-24 PROCEDURE — 69209 REMOVE IMPACTED EAR WAX UNI: CPT | Performed by: FAMILY MEDICINE

## 2022-01-24 PROCEDURE — 1036F TOBACCO NON-USER: CPT | Performed by: FAMILY MEDICINE

## 2022-01-24 PROCEDURE — G8484 FLU IMMUNIZE NO ADMIN: HCPCS | Performed by: FAMILY MEDICINE

## 2022-01-24 RX ORDER — FAMOTIDINE 20 MG/1
TABLET, FILM COATED ORAL
Qty: 180 TABLET | Refills: 1 | Status: SHIPPED | OUTPATIENT
Start: 2022-01-24 | End: 2022-05-10 | Stop reason: SDUPTHER

## 2022-01-24 RX ORDER — SERTRALINE HYDROCHLORIDE 100 MG/1
100 TABLET, FILM COATED ORAL EVERY MORNING
Qty: 90 TABLET | Refills: 1 | Status: SHIPPED | OUTPATIENT
Start: 2022-01-24 | End: 2022-05-10 | Stop reason: SDUPTHER

## 2022-01-24 RX ORDER — ATORVASTATIN CALCIUM 40 MG/1
40 TABLET, FILM COATED ORAL NIGHTLY
Qty: 90 TABLET | Refills: 1 | Status: SHIPPED | OUTPATIENT
Start: 2022-01-24 | End: 2022-09-13 | Stop reason: SDUPTHER

## 2022-01-24 RX ORDER — ONDANSETRON HYDROCHLORIDE 8 MG/1
TABLET, FILM COATED ORAL
Qty: 30 TABLET | Refills: 1 | Status: SHIPPED | OUTPATIENT
Start: 2022-01-24 | End: 2022-05-10 | Stop reason: SDUPTHER

## 2022-01-24 NOTE — PROGRESS NOTES
infection) In Past    No Current Symptoms    Wears dentures     Full Upper, Partial Lower    Wears glasses     Wears partial dentures     Lower       FAMILY HISTORY  Family History   Problem Relation Age of Onset    Diabetes Mother     Cancer Mother         \"Brain Cancer\"   Vallarie Brett Tuberculosis Father     Diabetes Sister     COPD Sister     Early Death Brother 61        Lung Cancer    Lung Cancer Brother     Early Death Daughter 47        Lung Cancer    Cancer Daughter         Lung Cancer    Cancer Daughter         Breast Cancer    Breast Cancer Daughter     Early Death Daughter 52        Breast Cancer       SOCIAL HISTORY  Social History     Socioeconomic History    Marital status:      Spouse name: None    Number of children: None    Years of education: None    Highest education level: None   Occupational History    None   Tobacco Use    Smoking status: Never Smoker    Smokeless tobacco: Never Used   Vaping Use    Vaping Use: Never used   Substance and Sexual Activity    Alcohol use: No    Drug use: No    Sexual activity: Never   Other Topics Concern    None   Social History Narrative    None     Social Determinants of Health     Financial Resource Strain: Low Risk     Difficulty of Paying Living Expenses: Not hard at all   Food Insecurity: No Food Insecurity    Worried About Running Out of Food in the Last Year: Never true    [address] in the Last Year: Never true   Transportation Needs:     Lack of Transportation (Medical): Not on file    Lack of Transportation (Non-Medical):  Not on file   Physical Activity:     Days of Exercise per Week: Not on file    Minutes of Exercise per Session: Not on file   Stress:     Feeling of Stress : Not on file   Social Connections:     Frequency of Communication with Friends and Family: Not on file    Frequency of Social Gatherings with Friends and Family: Not on file    Attends [employer] Services: Not on file   [employer] of Clubs or Organizations: Not on file    Attends Club or Organization Meetings: Not on file    Marital Status: Not on file   Intimate Partner Violence:     Fear of Current or Ex-Partner: Not on file    Emotionally Abused: Not on file    Physically Abused: Not on file    Sexually Abused: Not on file   Housing Stability:     Unable to Pay for Housing in the Last Year: Not on file    Number of [place] in the Last Year: Not on file    Unstable Housing in the Last Year: Not on file        SURGICAL HISTORY  Past Surgical History:   Procedure Laterality Date   [facility]  2012    ACF C4, C5 With Hardware    BLADDER SUSPENSION  2002    Done With Vaginal Hysterectomy    CHOLECYSTECTOMY, LAPAROSCOPIC  11/21/2016    COLONOSCOPY  7-17-13    Polyps x2, Internal hemorrhoids    COLONOSCOPY  01/08/2019    Ulcerated mass in sigmoid at 15 cm, Internal grade 1 hemorrhoids, otherwise normal to 40cm    COLONOSCOPY  10/17/2019    grade 1 int hem, s/p sigmoid resection, repeat in 3 years    COLONOSCOPY N/A 10/17/2019    COLORECTAL CANCER SCREENING, HIGH RISK performed by Hollie Lynch MD at [address] N/A 8/12/2020    COLONOSCOPY POLYPECTOMY SNARE/COLD BIOPSY performed by Hollie Lynch MD at [address] [place] [address] Left 2016    Broken Left Wrist With Hardware    HYSTERECTOMY VAGINAL  2002    Bladder Suspension Also Done    INSERTION / REMOVAL / REPLACEMENT VENOUS ACCESS CATHETER Right 2/20/2019    PORT INSERTION performed by Moose Anthony MD at [address] 1/8/2019    [facility] with tattoo performed by Hollie Lynch MD at [place] [address] N/A 1/16/2019    BOWEL RESECTION SIGMOID performed by Moose Anthony MD at [address] [place] [address]  Late 1970's    TUNNELED VENOUS PORT PLACEMENT  02/20/2019       CURRENT MEDICATIONS  Current Outpatient Medications   Medication Sig Dispense Refill    famotidine (PEPCID) 20 MG tablet TAKE 1 TABLET BY MOUTH TWICE DAILY 180 tablet 1    ondansetron (ZOFRAN) 8 MG tablet TK 1 T PO Q 8 H PRF NAUSEA OR VOM 30 tablet 1    sertraline (ZOLOFT) 100 MG tablet Take 1 tablet by mouth every morning 90 tablet 1    atorvastatin (LIPITOR) 40 MG tablet Take 1 tablet by mouth nightly 90 tablet 1    dilTIAZem (CARDIZEM CD) 120 MG extended release capsule Take 2 capsules by mouth daily 180 capsule 3    loperamide (IMODIUM) 2 MG capsule Take 2 mg by mouth 4 times daily as needed for Diarrhea      Blood Pressure Monitoring (BLOOD PRESSURE KIT) JOSE DAVID Use as directed 1 Device 0    Acetaminophen (TYLENOL EX ST ARTHRITIS PAIN PO) Take by mouth daily as needed TAKE PRN       No current facility-administered medications for this visit. ALLERGIES  Allergies   Allergen Reactions    Neosporin [Neomycin-Polymyx-Gramicid] Swelling       PHYSICAL EXAM    /74   Pulse 80   Ht 5' (1.524 m)   Wt 199 lb (90.3 kg)   SpO2 95%   BMI 38.86 kg/m²     Physical Exam  Vitals and nursing note reviewed. Constitutional:       Appearance: Normal appearance. HENT:      Ears:      Comments: Right ear canal occluded with cerumen which was removed by the nurse with water irrigation tympanic membrane appears normal now  Cardiovascular:      Rate and Rhythm: Normal rate. Pulmonary:      Effort: Pulmonary effort is normal.     Reviewed immunizations  Reviewed labs done from oncology        ASSESSMENT and PLAN    Juan Antonio Reis was seen today for 6 month follow-up and ear problem.     Diagnoses and all orders for this visit:    Malignant neoplasm of colon, unspecified part of colon ([address]    Ischemic colitis ([address]    Major depressive disorder with single episode, in full remission ([address]    Chronic kidney disease, stage IV (severe) (HCC)    IFG (impaired fasting glucose)    Hyperkalemia    Essential hypertension    Impacted cerumen of right ear    Other orders  -     famotidine (PEPCID) 20 MG tablet; TAKE 1 TABLET BY MOUTH TWICE DAILY  -     ondansetron (ZOFRAN) 8 MG tablet; TK 1 T PO Q 8 H PRF NAUSEA OR VOM  -     sertraline (ZOLOFT) 100 MG tablet; Take 1 tablet by mouth every morning  -     atorvastatin (LIPITOR) 40 MG tablet; Take 1 tablet by mouth nightly  -     INFLUENZA, QUADV, ADJUVANTED, 65 YRS =, IM, PF, PREFILL SYR, 0.5ML (FLUAD)    Continue same treatment  Follow-up here in 6 months    Return in about 6 months (around 7/24/2022). Electronically signed by Gretel Anderson MD on 1/24/2022    Please note that this chart was generated using dragon dictation software. Although every effort was made to ensure the accuracy of this automated transcription, some errors in transcription may have occurred.

## 2022-02-21 ENCOUNTER — HOSPITAL ENCOUNTER (OUTPATIENT)
Dept: WOMENS IMAGING | Age: 77
Discharge: HOME | End: 2022-02-21
Payer: MEDICARE

## 2022-02-21 DIAGNOSIS — R31.1 BENIGN ESSENTIAL MICROSCOPIC HEMATURIA: ICD-10-CM

## 2022-02-21 DIAGNOSIS — E66.01 MORBIDLY OBESE (HCC): ICD-10-CM

## 2022-02-21 DIAGNOSIS — N18.32 STAGE 3B CHRONIC KIDNEY DISEASE (HCC): ICD-10-CM

## 2022-02-21 DIAGNOSIS — C18.7 CANCER OF SIGMOID COLON (HCC): ICD-10-CM

## 2022-02-21 DIAGNOSIS — I10 ESSENTIAL HYPERTENSION: ICD-10-CM

## 2022-02-21 DIAGNOSIS — E87.5 HYPERKALEMIA: ICD-10-CM

## 2022-02-21 DIAGNOSIS — Z12.31 ENCOUNTER FOR SCREENING MAMMOGRAM FOR BREAST CANCER: ICD-10-CM

## 2022-02-21 LAB
ALBUMIN SERPL-MCNC: 4.3 G/DL (ref 3.4–5)
ANION GAP SERPL CALCULATED.3IONS-SCNC: 16 MMOL/L (ref 3–16)
BACTERIA: ABNORMAL /HPF
BASOPHILS ABSOLUTE: 0 K/UL (ref 0–0.2)
BASOPHILS RELATIVE PERCENT: 0.8 %
BILIRUBIN URINE: NEGATIVE
BLOOD, URINE: NEGATIVE
BUN BLDV-MCNC: 28 MG/DL (ref 7–20)
CALCIUM SERPL-MCNC: 9.9 MG/DL (ref 8.3–10.6)
CHLORIDE BLD-SCNC: 104 MMOL/L (ref 99–110)
CLARITY: ABNORMAL
CO2: 22 MMOL/L (ref 21–32)
COLOR: YELLOW
CREAT SERPL-MCNC: 1.4 MG/DL (ref 0.6–1.2)
CREATININE URINE: 159.7 MG/DL (ref 28–259)
EOSINOPHILS ABSOLUTE: 0.2 K/UL (ref 0–0.6)
EOSINOPHILS RELATIVE PERCENT: 4.3 %
EPITHELIAL CELLS, UA: 7 /HPF (ref 0–5)
GFR AFRICAN AMERICAN: 44
GFR NON-AFRICAN AMERICAN: 36
GLUCOSE BLD-MCNC: 142 MG/DL (ref 70–99)
GLUCOSE URINE: NEGATIVE MG/DL
HCT VFR BLD CALC: 31.3 % (ref 36–48)
HEMOGLOBIN: 9.7 G/DL (ref 12–16)
HYALINE CASTS: 5 /LPF (ref 0–8)
KETONES, URINE: NEGATIVE MG/DL
LEUKOCYTE ESTERASE, URINE: ABNORMAL
LYMPHOCYTES ABSOLUTE: 1 K/UL (ref 1–5.1)
LYMPHOCYTES RELATIVE PERCENT: 20.9 %
MAGNESIUM: 1.5 MG/DL (ref 1.8–2.4)
MCH RBC QN AUTO: 22.6 PG (ref 26–34)
MCHC RBC AUTO-ENTMCNC: 31 G/DL (ref 31–36)
MCV RBC AUTO: 72.7 FL (ref 80–100)
MICROSCOPIC EXAMINATION: YES
MONOCYTES ABSOLUTE: 0.4 K/UL (ref 0–1.3)
MONOCYTES RELATIVE PERCENT: 7.5 %
NEUTROPHILS ABSOLUTE: 3.1 K/UL (ref 1.7–7.7)
NEUTROPHILS RELATIVE PERCENT: 66.5 %
NITRITE, URINE: POSITIVE
PDW BLD-RTO: 15.6 % (ref 12.4–15.4)
PH UA: 6 (ref 5–8)
PHOSPHORUS: 2.6 MG/DL (ref 2.5–4.9)
PLATELET # BLD: 244 K/UL (ref 135–450)
PMV BLD AUTO: 8 FL (ref 5–10.5)
POTASSIUM SERPL-SCNC: 5 MMOL/L (ref 3.5–5.1)
PROTEIN PROTEIN: 18 MG/DL
PROTEIN UA: ABNORMAL MG/DL
PROTEIN/CREAT RATIO: 0.1 MG/DL
RBC # BLD: 4.3 M/UL (ref 4–5.2)
RBC UA: 2 /HPF (ref 0–4)
SODIUM BLD-SCNC: 142 MMOL/L (ref 136–145)
SPECIFIC GRAVITY UA: 1.02 (ref 1–1.03)
URINE TYPE: ABNORMAL
UROBILINOGEN, URINE: 0.2 E.U./DL
WBC # BLD: 4.7 K/UL (ref 4–11)
WBC UA: 35 /HPF (ref 0–5)

## 2022-02-21 PROCEDURE — 77067 SCR MAMMO BI INCL CAD: CPT

## 2022-02-24 ENCOUNTER — HOSPITAL ENCOUNTER (OUTPATIENT)
Dept: ULTRASOUND IMAGING | Age: 77
Discharge: HOME | End: 2022-02-24
Payer: MEDICARE

## 2022-02-24 ENCOUNTER — HOSPITAL ENCOUNTER (OUTPATIENT)
Dept: WOMENS IMAGING | Age: 77
Discharge: HOME | End: 2022-02-24
Payer: MEDICARE

## 2022-02-24 DIAGNOSIS — R92.8 ABNORMAL MAMMOGRAM: ICD-10-CM

## 2022-02-24 PROCEDURE — 76642 ULTRASOUND BREAST LIMITED: CPT

## 2022-02-24 PROCEDURE — G0279 TOMOSYNTHESIS, MAMMO: HCPCS

## 2022-03-03 PROBLEM — N63.0 BREAST MASS: Status: ACTIVE | Noted: 2022-03-03

## 2022-03-07 ENCOUNTER — HOSPITAL ENCOUNTER (OUTPATIENT)
Dept: WOMENS IMAGING | Age: 77
Discharge: HOME | End: 2022-03-07
Payer: MEDICARE

## 2022-03-07 ENCOUNTER — HOSPITAL ENCOUNTER (OUTPATIENT)
Dept: ULTRASOUND IMAGING | Age: 77
Discharge: HOME | End: 2022-03-07
Payer: MEDICARE

## 2022-03-07 DIAGNOSIS — N63.20 LEFT BREAST MASS: ICD-10-CM

## 2022-03-07 PROCEDURE — 88341 IMHCHEM/IMCYTCHM EA ADD ANTB: CPT | Performed by: PATHOLOGY

## 2022-03-07 PROCEDURE — 77065 DX MAMMO INCL CAD UNI: CPT

## 2022-03-07 PROCEDURE — 88305 TISSUE EXAM BY PATHOLOGIST: CPT | Performed by: PATHOLOGY

## 2022-03-07 PROCEDURE — 19083 BX BREAST 1ST LESION US IMAG: CPT

## 2022-03-07 PROCEDURE — 88342 IMHCHEM/IMCYTCHM 1ST ANTB: CPT | Performed by: PATHOLOGY

## 2022-03-07 PROCEDURE — 88360 TUMOR IMMUNOHISTOCHEM/MANUAL: CPT | Performed by: PATHOLOGY

## 2022-03-10 ENCOUNTER — OFFICE VISIT (OUTPATIENT)
Dept: FAMILY MEDICINE CLINIC | Age: 77
End: 2022-03-10
Payer: MEDICARE

## 2022-03-10 VITALS
BODY MASS INDEX: 38.48 KG/M2 | SYSTOLIC BLOOD PRESSURE: 138 MMHG | HEART RATE: 80 BPM | HEIGHT: 60 IN | OXYGEN SATURATION: 94 % | WEIGHT: 196 LBS | DIASTOLIC BLOOD PRESSURE: 68 MMHG

## 2022-03-10 DIAGNOSIS — N63.0 BREAST MASS: ICD-10-CM

## 2022-03-10 DIAGNOSIS — C50.912 INVASIVE DUCTAL CARCINOMA OF LEFT BREAST (HCC): Primary | ICD-10-CM

## 2022-03-10 PROCEDURE — 99213 OFFICE O/P EST LOW 20 MIN: CPT | Performed by: STUDENT IN AN ORGANIZED HEALTH CARE EDUCATION/TRAINING PROGRAM

## 2022-03-10 PROCEDURE — 4040F PNEUMOC VAC/ADMIN/RCVD: CPT | Performed by: STUDENT IN AN ORGANIZED HEALTH CARE EDUCATION/TRAINING PROGRAM

## 2022-03-10 PROCEDURE — 1090F PRES/ABSN URINE INCON ASSESS: CPT | Performed by: STUDENT IN AN ORGANIZED HEALTH CARE EDUCATION/TRAINING PROGRAM

## 2022-03-10 PROCEDURE — G8484 FLU IMMUNIZE NO ADMIN: HCPCS | Performed by: STUDENT IN AN ORGANIZED HEALTH CARE EDUCATION/TRAINING PROGRAM

## 2022-03-10 PROCEDURE — G8427 DOCREV CUR MEDS BY ELIG CLIN: HCPCS | Performed by: STUDENT IN AN ORGANIZED HEALTH CARE EDUCATION/TRAINING PROGRAM

## 2022-03-10 PROCEDURE — 1036F TOBACCO NON-USER: CPT | Performed by: STUDENT IN AN ORGANIZED HEALTH CARE EDUCATION/TRAINING PROGRAM

## 2022-03-10 PROCEDURE — 1123F ACP DISCUSS/DSCN MKR DOCD: CPT | Performed by: STUDENT IN AN ORGANIZED HEALTH CARE EDUCATION/TRAINING PROGRAM

## 2022-03-10 PROCEDURE — G8417 CALC BMI ABV UP PARAM F/U: HCPCS | Performed by: STUDENT IN AN ORGANIZED HEALTH CARE EDUCATION/TRAINING PROGRAM

## 2022-03-10 PROCEDURE — G8400 PT W/DXA NO RESULTS DOC: HCPCS | Performed by: STUDENT IN AN ORGANIZED HEALTH CARE EDUCATION/TRAINING PROGRAM

## 2022-03-10 ASSESSMENT — ENCOUNTER SYMPTOMS
SHORTNESS OF BREATH: 0
WHEEZING: 0
SORE THROAT: 0
NAUSEA: 0
ABDOMINAL PAIN: 0

## 2022-03-10 NOTE — PROGRESS NOTES
3/22/2022    Pietro Dominguez    Chief Complaint   Patient presents with   [facility]     surgical pathology report 3/7/22       HPI  History was obtained from patient. Genesis Jacobson is a 67 y.o. female with a PMHx Hx. Of colon cancer as listed below who presents today to discuss recent lab results. Patient recent biopsy positive invasive ductal carcionoma of left breast. P  Patient current following with Dr. Black Hx. Of Colon cancer. We will scheudule appt. Patient preference breast cancer surgeon in [facility]. Patient asymptomatic only issue was low ferritin. No discharge, change in color, fever, weight loss. Hx. Colon cancer last chemo nearly 3 years ago follows with Dr. Black in [facility]    1. Invasive ductal carcinoma of left breast ([address]    2. Breast mass             REVIEW OF SYMPTOMS    Review of Systems   Constitutional: Negative for chills and fatigue. HENT: Negative for congestion and sore throat. Respiratory: Negative for shortness of breath and wheezing. Cardiovascular: Negative for chest pain and palpitations. Gastrointestinal: Negative for abdominal pain and nausea. Genitourinary: Negative for frequency and urgency. Neurological: Negative for light-headedness.        PAST MEDICAL HISTORY  Past Medical History:   Diagnosis Date    Abnormal EKG      see ekg report-11/4/2016-\"have appt 11/18/2016 to see Dr Darrell Sr to get heart clearance for surgery - 11/21/2016\"    Anemia     Chemotherapy induced diarrhea     Chronic kidney disease, stage 3 (HCC)     Colon Cancer Dx 1-8-19    Colonoscopy, tx surg- following with Dr Black to start chemo    DVT (deep vein thrombosis) in pregnancy     Essential hypertension     History of blood transfusion 1960's    No Reaction To Blood Transfusion Received    Hx of blood clots     left leg    Hx of colonic polyp     Hyperlipidemia     Hypertension     Impaired fasting glucose     Malignant neoplasm of colon (HCC)     PONV (postoperative nausea and vomiting)     denies hx of motion sickness    Shortness of breath on exertion     Teeth missing     Upper And Lower    UTI (urinary tract infection) In Past    No Current Symptoms    Wears dentures     Full Upper, Partial Lower    Wears glasses     Wears partial dentures     Lower       FAMILY HISTORY  Family History   Problem Relation Age of Onset    Diabetes Mother     Cancer Mother         \"Brain Cancer\"   [place] Porch Tuberculosis Father     Diabetes Sister     COPD Sister     Early Death Brother 61        Lung Cancer    Lung Cancer Brother     Early Death Daughter 47        Lung Cancer    Cancer Daughter         Lung Cancer    Cancer Daughter         Breast Cancer    Early Death Daughter 52        Breast Cancer    Breast Cancer Neg Hx        SOCIAL HISTORY  Social History     Socioeconomic History    Marital status:      Spouse name: None    Number of children: None    Years of education: None    Highest education level: None   Occupational History    None   Tobacco Use    Smoking status: Never Smoker    Smokeless tobacco: Never Used   Vaping Use    Vaping Use: Never used   Substance and Sexual Activity    Alcohol use: No    Drug use: No    Sexual activity: Never   Other Topics Concern    None   Social History Narrative    None     Social Determinants of Health     Financial Resource Strain: Low Risk     Difficulty of Paying Living Expenses: Not hard at all   Food Insecurity: No Food Insecurity    Worried About Running Out of Food in the Last Year: Never true    Favio of Food in the Last Year: Never true   Transportation Needs:     Lack of Transportation (Medical): Not on file    Lack of Transportation (Non-Medical):  Not on file   Physical Activity:     Days of Exercise per Week: Not on file    Minutes of Exercise per Session: Not on file   Stress:     Feeling of Stress : Not on file   Social Connections:     Frequency of Communication with Friends and Family: Not on file    Frequency of Social Gatherings with Friends and Family: Not on file    Attends Sikh Services: Not on file    Active Member of Clubs or Organizations: Not on file    Attends Club or Organization Meetings: Not on file    Marital Status: Not on file   Intimate Partner Violence:     Fear of Current or Ex-Partner: Not on file    Emotionally Abused: Not on file    Physically Abused: Not on file    Sexually Abused: Not on file   Housing Stability:     Unable to Pay for Housing in the Last Year: Not on file    Number of [place] in the Last Year: Not on file    Unstable Housing in the Last Year: Not on file        SURGICAL HISTORY  Past Surgical History:   Procedure Laterality Date   [facility]  2012    ACF C4, C5 With Hardware    BLADDER SUSPENSION  2002    Done With Vaginal Hysterectomy    CHOLECYSTECTOMY, LAPAROSCOPIC  11/21/2016    COLONOSCOPY  7-17-13    Polyps x2, Internal hemorrhoids    COLONOSCOPY  01/08/2019    Ulcerated mass in sigmoid at 15 cm, Internal grade 1 hemorrhoids, otherwise normal to 40cm    COLONOSCOPY  10/17/2019    grade 1 int hem, s/p sigmoid resection, repeat in 3 years    COLONOSCOPY N/A 10/17/2019    COLORECTAL CANCER SCREENING, HIGH RISK performed by Sherie Patel MD at [address] N/A 8/12/2020    COLONOSCOPY POLYPECTOMY SNARE/COLD BIOPSY performed by Sherie Patel MD at [address] Left 2016    Broken Left Wrist With Hardware    HYSTERECTOMY VAGINAL  2002    Bladder Suspension Also Done    INSERTION / REMOVAL / REPLACEMENT VENOUS ACCESS CATHETER Right 2/20/2019    PORT INSERTION performed by Simone Yoo MD at [facility] N/A 1/8/2019    [facility] with tattoo performed by Sherie Patel MD at [place] [address] N/A 1/16/2019    BOWEL RESECTION SIGMOID performed by Simone Yoo MD at [facility] OR    TUBAL LIGATION  Late 1970's    TUNNELED VENOUS PORT PLACEMENT  02/20/2019     BREAST NEEDLE BIOPSY LEFT Left 3/7/2022     BREAST NEEDLE BIOPSY LEFT 3/7/2022 Kurt French MD [facility]                 CURRENT MEDICATIONS  Current Outpatient Medications   Medication Sig Dispense Refill    ferrous sulfate (IRON 325) 325 (65 Fe) MG tablet Take 1 tablet by mouth 3 times daily (with meals) 90 tablet 3    docusate sodium (COLACE) 100 MG capsule Take 1 capsule by mouth daily 30 capsule 3    famotidine (PEPCID) 20 MG tablet TAKE 1 TABLET BY MOUTH TWICE DAILY 180 tablet 1    ondansetron (ZOFRAN) 8 MG tablet TK 1 T PO Q 8 H PRF NAUSEA OR VOM 30 tablet 1    sertraline (ZOLOFT) 100 MG tablet Take 1 tablet by mouth every morning 90 tablet 1    atorvastatin (LIPITOR) 40 MG tablet Take 1 tablet by mouth nightly 90 tablet 1    dilTIAZem (CARDIZEM CD) 120 MG extended release capsule Take 2 capsules by mouth daily 180 capsule 3    loperamide (IMODIUM) 2 MG capsule Take 2 mg by mouth 4 times daily as needed for Diarrhea      Blood Pressure Monitoring (BLOOD PRESSURE KIT) JOSE DAVID Use as directed 1 Device 0    Acetaminophen (TYLENOL EX ST ARTHRITIS PAIN PO) Take by mouth daily as needed TAKE PRN       No current facility-administered medications for this visit. ALLERGIES  Allergies   Allergen Reactions    Neosporin [Neomycin-Polymyx-Gramicid] Swelling       PHYSICAL EXAM    /68   Pulse 80   Ht 5' (1.524 m)   Wt 196 lb (88.9 kg)   SpO2 94%   BMI 38.28 kg/m²     Physical Exam  Constitutional:       Appearance: Normal appearance. HENT:      Head: Normocephalic and atraumatic. Eyes:      Extraocular Movements: Extraocular movements intact. Pupils: Pupils are equal, round, and reactive to light. Cardiovascular:      Rate and Rhythm: Normal rate and regular rhythm. Pulses: Normal pulses. Heart sounds: No murmur heard. No friction rub. No gallop.     Pulmonary:      Effort: Pulmonary effort is normal.      Breath sounds: Normal breath sounds. Skin:     General: Skin is warm and dry. Neurological:      General: No focal deficit present. Mental Status: She is alert. Psychiatric:         Mood and Affect: Mood normal.         Behavior: Behavior normal.         ASSESSMENT & PLAN    1. Invasive ductal carcinoma of left breast ([address]  -plan to continue care with Heme/Onc Dr. Sherlyn Anglin  -Patient preference Breast Surgeon in [facility]   - External Referral To General Surgery  - Amb External Referral To Hematology Oncology    2. Breast mass  -see above      Return in about 2 months (around 5/10/2022).          Electronically signed by Binh Amador DO on 3/22/2022

## 2022-03-11 ENCOUNTER — TELEPHONE (OUTPATIENT)
Dept: FAMILY MEDICINE CLINIC | Age: 77
End: 2022-03-11

## 2022-03-14 ENCOUNTER — TELEPHONE (OUTPATIENT)
Dept: FAMILY MEDICINE CLINIC | Age: 77
End: 2022-03-14

## 2022-03-14 NOTE — TELEPHONE ENCOUNTER
[address] [place] [address]. Transferred to [facility] who will be back in office tomorrow. She takes care of New Patients. I had a message from Dr. Hernan Estrada that just said to call their office they need confirmation. I let her know Ava Ritter, and Pathology are in [facility], but I would be happy to fax if needed.

## 2022-03-16 ENCOUNTER — TELEPHONE (OUTPATIENT)
Dept: FAMILY MEDICINE CLINIC | Age: 77
End: 2022-03-16

## 2022-03-16 NOTE — TELEPHONE ENCOUNTER
Cristóbal called from Dr. Luz Rowland office on 03/15/2022. He is no longer with the practice. She will reach out to patient and offer another provider.

## 2022-03-22 ENCOUNTER — TELEPHONE (OUTPATIENT)
Dept: FAMILY MEDICINE CLINIC | Age: 77
End: 2022-03-22

## 2022-03-22 NOTE — TELEPHONE ENCOUNTER
FYI:    Called and patient states she is already set up with Dr. Black and Dr. Sha Cherry. She said she is doing ok. I advised her to call us if any issues. She verbalized understanding.

## 2022-03-24 LAB
A/G RATIO: 2.4 RATIO (ref 0.8–2.6)
ALBUMIN SERPL-MCNC: 4.8 G/DL (ref 3.5–5.2)
ALP BLD-CCNC: 96 U/L (ref 23–144)
ALT SERPL-CCNC: 11 U/L (ref 0–60)
AST SERPL-CCNC: 16 U/L (ref 0–55)
BANDED NEUTROPHILS ABSOLUTE COUNT: 4.4 K/UL (ref 1.8–7.5)
BASOPHILS ABSOLUTE: 0 K/UL (ref 0–0.3)
BASOPHILS RELATIVE PERCENT: 0.6 % (ref 0–2)
BILIRUB SERPL-MCNC: 0.3 MG/DL (ref 0–1.2)
BUN / CREAT RATIO: 19 (ref 7–25)
BUN BLDV-MCNC: 31 MG/DL (ref 3–29)
CALCIUM SERPL-MCNC: 10 MG/DL (ref 8.5–10.5)
CHLORIDE BLD-SCNC: 104 MEQ/L (ref 96–110)
CO2: 24 MEQ/L (ref 19–32)
CREAT SERPL-MCNC: 1.6 MG/DL (ref 0.5–1.2)
DIFFERENTIAL COUNT: ABNORMAL
EOSINOPHILS ABSOLUTE: 0.2 K/UL (ref 0–0.5)
EOSINOPHILS RELATIVE PERCENT: 3.5 % (ref 0–5)
FASTING STATUS: ABNORMAL
GFR SERPL CREATININE-BSD FRML MDRD: 33 MLS/MIN/1.73M2
GLOBULIN: 2 G/DL (ref 1.9–3.6)
GLUCOSE BLD-MCNC: 170 MG/DL (ref 70–99)
GRANULOCYTE IMMATURE ABS: 0 K/UL (ref 0–0.1)
HCT VFR BLD CALC: 35.7 % (ref 34–49)
HEMOGLOBIN: 10.1 G/DL (ref 11.2–15.7)
IMMATURE GRANULOCYTES: 0.3 %
LYMPHOCYTES ABSOLUTE: 1.4 K/UL (ref 0.9–4.1)
LYMPHOCYTES RELATIVE PERCENT: 20.8 % (ref 14–51)
MCH RBC QN AUTO: 22.6 PG (ref 26–34)
MCHC RBC AUTO-ENTMCNC: 28.3 G/DL (ref 30.7–35.5)
MCV RBC AUTO: 79.9 FL (ref 80–100)
MONOCYTES ABSOLUTE: 0.5 K/UL (ref 0.2–1)
MONOCYTES RELATIVE PERCENT: 8 % (ref 4–12)
PDW BLD-RTO: 16.5 %
PLATELET # BLD: 246 K/UL (ref 140–400)
PLATELET ESTIMATE: ABNORMAL
PMV BLD AUTO: 9.8 FL (ref 7.2–11.7)
POTASSIUM SERPL-SCNC: 4.3 MEQ/L (ref 3.4–5.3)
RBC # BLD: 4.47 M/UL (ref 3.95–5.26)
RBC # BLD: ABNORMAL 10*6/UL
SEGMENTED NEUTROPHILS RELATIVE PERCENT: 66.8 % (ref 42–80)
SODIUM BLD-SCNC: 141 MEQ/L (ref 135–148)
TOTAL PROTEIN: 6.8 G/DL (ref 6–8.3)
WBC # BLD: 6.6 K/UL (ref 3.5–10.9)
WBC # BLD: NORMAL 10*3/UL

## 2022-05-10 ENCOUNTER — OFFICE VISIT (OUTPATIENT)
Dept: FAMILY MEDICINE CLINIC | Age: 77
End: 2022-05-10
Payer: MEDICARE

## 2022-05-10 VITALS
BODY MASS INDEX: 37.17 KG/M2 | DIASTOLIC BLOOD PRESSURE: 72 MMHG | HEIGHT: 61 IN | WEIGHT: 196.9 LBS | OXYGEN SATURATION: 98 % | HEART RATE: 77 BPM | SYSTOLIC BLOOD PRESSURE: 128 MMHG | RESPIRATION RATE: 16 BRPM

## 2022-05-10 DIAGNOSIS — R11.0 NAUSEA: ICD-10-CM

## 2022-05-10 DIAGNOSIS — C50.912 INVASIVE DUCTAL CARCINOMA OF LEFT BREAST (HCC): Primary | ICD-10-CM

## 2022-05-10 DIAGNOSIS — F32.5 MAJOR DEPRESSIVE DISORDER WITH SINGLE EPISODE, IN FULL REMISSION (HCC): ICD-10-CM

## 2022-05-10 DIAGNOSIS — K21.9 GASTROESOPHAGEAL REFLUX DISEASE, UNSPECIFIED WHETHER ESOPHAGITIS PRESENT: ICD-10-CM

## 2022-05-10 PROCEDURE — 4040F PNEUMOC VAC/ADMIN/RCVD: CPT | Performed by: STUDENT IN AN ORGANIZED HEALTH CARE EDUCATION/TRAINING PROGRAM

## 2022-05-10 PROCEDURE — G8427 DOCREV CUR MEDS BY ELIG CLIN: HCPCS | Performed by: STUDENT IN AN ORGANIZED HEALTH CARE EDUCATION/TRAINING PROGRAM

## 2022-05-10 PROCEDURE — G8400 PT W/DXA NO RESULTS DOC: HCPCS | Performed by: STUDENT IN AN ORGANIZED HEALTH CARE EDUCATION/TRAINING PROGRAM

## 2022-05-10 PROCEDURE — 1123F ACP DISCUSS/DSCN MKR DOCD: CPT | Performed by: STUDENT IN AN ORGANIZED HEALTH CARE EDUCATION/TRAINING PROGRAM

## 2022-05-10 PROCEDURE — 1090F PRES/ABSN URINE INCON ASSESS: CPT | Performed by: STUDENT IN AN ORGANIZED HEALTH CARE EDUCATION/TRAINING PROGRAM

## 2022-05-10 PROCEDURE — 99213 OFFICE O/P EST LOW 20 MIN: CPT | Performed by: STUDENT IN AN ORGANIZED HEALTH CARE EDUCATION/TRAINING PROGRAM

## 2022-05-10 PROCEDURE — 1036F TOBACCO NON-USER: CPT | Performed by: STUDENT IN AN ORGANIZED HEALTH CARE EDUCATION/TRAINING PROGRAM

## 2022-05-10 PROCEDURE — G8417 CALC BMI ABV UP PARAM F/U: HCPCS | Performed by: STUDENT IN AN ORGANIZED HEALTH CARE EDUCATION/TRAINING PROGRAM

## 2022-05-10 RX ORDER — SERTRALINE HYDROCHLORIDE 100 MG/1
150 TABLET, FILM COATED ORAL EVERY MORNING
Qty: 135 TABLET | Refills: 1 | Status: SHIPPED | OUTPATIENT
Start: 2022-05-10 | End: 2022-11-06

## 2022-05-10 RX ORDER — ONDANSETRON HYDROCHLORIDE 8 MG/1
TABLET, FILM COATED ORAL
Qty: 30 TABLET | Refills: 1 | Status: SHIPPED | OUTPATIENT
Start: 2022-05-10

## 2022-05-10 RX ORDER — FAMOTIDINE 20 MG/1
TABLET, FILM COATED ORAL
Qty: 180 TABLET | Refills: 1 | Status: SHIPPED | OUTPATIENT
Start: 2022-05-10

## 2022-05-10 RX ORDER — LOPERAMIDE HYDROCHLORIDE 2 MG/1
2 CAPSULE ORAL 4 TIMES DAILY PRN
Qty: 90 CAPSULE | Refills: 2 | Status: SHIPPED | OUTPATIENT
Start: 2022-05-10 | End: 2022-06-09

## 2022-05-10 ASSESSMENT — ENCOUNTER SYMPTOMS
NAUSEA: 0
WHEEZING: 0
ABDOMINAL PAIN: 0
SORE THROAT: 0
SHORTNESS OF BREATH: 0

## 2022-05-10 NOTE — PROGRESS NOTES
5/15/2022    Ely Carias    Chief Complaint   Patient presents with    1 Month Follow-Up     2 month ck        HPI  History was obtained from patient. Jayla Curran is a 67 y.o. female with a PMHx as listed below who presents today for 2 month follow up on chronic condtions. No acute complaints. Now s/p lumpectomy, awaiting pathology  Following with Dr. Black   Notes some anxiety, family support to help get stress out. On cardizem following with Nephrology    1. Invasive ductal carcinoma of left breast ([facility] [address]    2. Nausea    3. Gastroesophageal reflux disease, unspecified whether esophagitis present    4. Major depressive disorder with single episode, in full remission ([facility] [address]             REVIEW OF SYMPTOMS    Review of Systems   Constitutional: Negative for chills and fatigue. HENT: Negative for congestion and sore throat. Respiratory: Negative for shortness of breath and wheezing. Cardiovascular: Negative for chest pain and palpitations. Gastrointestinal: Negative for abdominal pain and nausea. Genitourinary: Negative for frequency and urgency. Neurological: Negative for light-headedness.        PAST MEDICAL HISTORY  Past Medical History:   Diagnosis Date    Abnormal EKG      see ekg report-11/4/2016-\"have appt 11/18/2016 to see Dr Nigel Millard to get heart clearance for surgery - 11/21/2016\"    Anemia     Chemotherapy induced diarrhea     Chronic kidney disease, stage 3 (HCC)     Colon Cancer Dx 1-8-19    Colonoscopy, tx surg- following with Dr Black to start chemo    DVT (deep vein thrombosis) in pregnancy     Essential hypertension     History of blood transfusion 1960's    No Reaction To Blood Transfusion Received    Hx of blood clots     left leg    Hx of colonic polyp     Hyperlipidemia     Hypertension     Impaired fasting glucose     Malignant neoplasm of colon (HCC)     PONV (postoperative nausea and vomiting)     denies hx of motion sickness    Shortness of breath on exertion     Teeth missing     Upper And Lower    UTI (urinary tract infection) In Past    No Current Symptoms    Wears dentures     Full Upper, Partial Lower    Wears glasses     Wears partial dentures     Lower       FAMILY HISTORY  Family History   Problem Relation Age of Onset    Diabetes Mother     Cancer Mother         \"Brain Cancer\"   [place] Favorite Tuberculosis Father     Diabetes Sister     COPD Sister     Early Death Brother 61        Lung Cancer    Lung Cancer Brother     Early Death Daughter 47        Lung Cancer    Cancer Daughter         Lung Cancer    Cancer Daughter         Breast Cancer    Early Death Daughter 52        Breast Cancer    Breast Cancer Neg Hx        SOCIAL HISTORY  Social History     Socioeconomic History    Marital status:      Spouse name: None    Number of children: None    Years of education: None    Highest education level: None   Occupational History    None   Tobacco Use    Smoking status: Never Smoker    Smokeless tobacco: Never Used   Vaping Use    Vaping Use: Never used   Substance and Sexual Activity    Alcohol use: No    Drug use: No    Sexual activity: Never   Other Topics Concern    None   Social History Narrative    None     Social Determinants of Health     Financial Resource Strain: Low Risk     Difficulty of Paying Living Expenses: Not hard at all   Food Insecurity: No Food Insecurity    Worried About Running Out of Food in the Last Year: Never true    Favio of Food in the Last Year: Never true   Transportation Needs:     Lack of Transportation (Medical): Not on file    Lack of Transportation (Non-Medical):  Not on file   Physical Activity:     Days of Exercise per Week: Not on file    Minutes of Exercise per Session: Not on file   Stress:     Feeling of Stress : Not on file   Social Connections:     Frequency of Communication with Friends and Family: Not on file    Frequency of Social Gatherings with Friends and Family: Not on file    Attends Cheondoism Services: Not on file    Active Member of Clubs or Organizations: Not on file    Attends Club or Organization Meetings: Not on file    Marital Status: Not on file   Intimate Partner Violence:     Fear of Current or Ex-Partner: Not on file    Emotionally Abused: Not on file    Physically Abused: Not on file    Sexually Abused: Not on file   Housing Stability:     Unable to Pay for Housing in the Last Year: Not on file    Number of [place] in the Last Year: Not on file    Unstable Housing in the Last Year: Not on file        SURGICAL HISTORY  Past Surgical History:   Procedure Laterality Date   [address] [facility]  2012    ACF C4, C5 With Hardware    BLADDER SUSPENSION  2002    Done With Vaginal Hysterectomy    CHOLECYSTECTOMY, LAPAROSCOPIC  11/21/2016    COLONOSCOPY  7-17-13    Polyps x2, Internal hemorrhoids    COLONOSCOPY  01/08/2019    Ulcerated mass in sigmoid at 15 cm, Internal grade 1 hemorrhoids, otherwise normal to 40cm    COLONOSCOPY  10/17/2019    grade 1 int hem, s/p sigmoid resection, repeat in 3 years    COLONOSCOPY N/A 10/17/2019    COLORECTAL CANCER SCREENING, HIGH RISK performed by Alfonso Ellis MD at [address] N/A 8/12/2020    COLONOSCOPY POLYPECTOMY SNARE/COLD BIOPSY performed by Alfonso Ellis MD at [address] [place] [address] Left 2016    Broken Left Wrist With Hardware    HYSTERECTOMY VAGINAL  2002    Bladder Suspension Also Done    INSERTION / REMOVAL / REPLACEMENT VENOUS ACCESS CATHETER Right 2/20/2019    PORT INSERTION performed by Ziggy Waller MD at [address] [facility] N/A 1/8/2019    [facility] with tattoo performed by Alfonso Ellis MD at [facility] 1/16/2019    BOWEL RESECTION SIGMOID performed by Ziggy Waller MD at [address] [facility]  Late 1970's    TUNNELED [address]  02/20/2019     BREAST NEEDLE BIOPSY LEFT Left 3/7/2022     BREAST NEEDLE BIOPSY LEFT 3/7/2022 Rustam Redding MD [facility]                 CURRENT MEDICATIONS  Current Outpatient Medications   Medication Sig Dispense Refill    loperamide (IMODIUM) 2 MG capsule Take 1 capsule by mouth 4 times daily as needed for Diarrhea 90 capsule 2    famotidine (PEPCID) 20 MG tablet TAKE 1 TABLET BY MOUTH TWICE DAILY 180 tablet 1    ondansetron (ZOFRAN) 8 MG tablet TK 1 T PO Q 8 H PRF NAUSEA OR VOM 30 tablet 1    sertraline (ZOLOFT) 100 MG tablet Take 1.5 tablets by mouth every morning 135 tablet 1    ferrous sulfate (IRON 325) 325 (65 Fe) MG tablet Take 1 tablet by mouth 3 times daily (with meals) 90 tablet 3    docusate sodium (COLACE) 100 MG capsule Take 1 capsule by mouth daily 30 capsule 3    atorvastatin (LIPITOR) 40 MG tablet Take 1 tablet by mouth nightly 90 tablet 1    dilTIAZem (CARDIZEM CD) 120 MG extended release capsule Take 2 capsules by mouth daily 180 capsule 3    Blood Pressure Monitoring (BLOOD PRESSURE KIT) JOSE DAVID Use as directed 1 Device 0    Acetaminophen (TYLENOL EX ST ARTHRITIS PAIN PO) Take by mouth daily as needed TAKE PRN       No current facility-administered medications for this visit. ALLERGIES  Allergies   Allergen Reactions    Neosporin [Neomycin-Polymyx-Gramicid] Swelling       PHYSICAL EXAM    /72 (Site: Left Upper Arm, Position: Sitting, Cuff Size: Medium Adult)   Pulse 77   Resp 16   Ht 5' 1\" (1.549 m)   Wt 196 lb 14.4 oz (89.3 kg)   SpO2 98%   BMI 37.20 kg/m²     Physical Exam  Constitutional:       Appearance: Normal appearance. HENT:      Head: Normocephalic and atraumatic. Eyes:      Extraocular Movements: Extraocular movements intact. Pupils: Pupils are equal, round, and reactive to light. Cardiovascular:      Rate and Rhythm: Normal rate and regular rhythm. Pulses: Normal pulses. Heart sounds: No murmur heard. No friction rub. No gallop.     Skin: General: Skin is warm and dry. Neurological:      General: No focal deficit present. Mental Status: She is alert. Psychiatric:         Mood and Affect: Mood normal.         Behavior: Behavior normal.         ASSESSMENT & PLAN    1. Invasive ductal carcinoma of left breast ([facility]  Continue to follow with oncology/surgery    2. Nausea  Start prn  - loperamide (IMODIUM) 2 MG capsule; Take 1 capsule by mouth 4 times daily as needed for Diarrhea  Dispense: 90 capsule; Refill: 2  - ondansetron (ZOFRAN) 8 MG tablet; TK 1 T PO Q 8 H PRF NAUSEA OR VOM  Dispense: 30 tablet; Refill: 1    3. Gastroesophageal reflux disease, unspecified whether esophagitis present  Continue current regimen  - famotidine (PEPCID) 20 MG tablet; TAKE 1 TABLET BY MOUTH TWICE DAILY  Dispense: 180 tablet; Refill: 1    4. Major depressive disorder with single episode, in full remission ([facility] [address]  -anxiety worsened with condition. Discussed with patient CBT, declines    - sertraline (ZOLOFT) 100 MG tablet; Take 1.5 tablets by mouth every morning  Dispense: 135 tablet; Refill: 1    Hx. Hyperkalemia given resources foods to avoid recent K WNL following with nephrology      Return in about 4 months (around 9/10/2022).          Electronically signed by Ashli Mims DO on 5/15/2022

## 2022-05-11 RX ORDER — LOPERAMIDE HYDROCHLORIDE 2 MG/1
CAPSULE ORAL
Qty: 368 CAPSULE | OUTPATIENT
Start: 2022-05-11

## 2022-06-29 DIAGNOSIS — N63.0 BREAST MASS: ICD-10-CM

## 2022-06-29 DIAGNOSIS — I10 ESSENTIAL HYPERTENSION: ICD-10-CM

## 2022-06-29 DIAGNOSIS — E87.5 HYPERKALEMIA: ICD-10-CM

## 2022-06-29 DIAGNOSIS — E66.01 MORBIDLY OBESE (HCC): ICD-10-CM

## 2022-06-29 DIAGNOSIS — N18.32 STAGE 3B CHRONIC KIDNEY DISEASE (HCC): ICD-10-CM

## 2022-06-29 DIAGNOSIS — E61.1 IRON DEFICIENCY: Chronic | ICD-10-CM

## 2022-06-30 DIAGNOSIS — N63.0 BREAST MASS: ICD-10-CM

## 2022-06-30 DIAGNOSIS — N18.32 STAGE 3B CHRONIC KIDNEY DISEASE (HCC): ICD-10-CM

## 2022-06-30 DIAGNOSIS — E61.1 IRON DEFICIENCY: Chronic | ICD-10-CM

## 2022-06-30 DIAGNOSIS — E66.01 MORBIDLY OBESE (HCC): ICD-10-CM

## 2022-06-30 DIAGNOSIS — I10 ESSENTIAL HYPERTENSION: ICD-10-CM

## 2022-06-30 DIAGNOSIS — E87.5 HYPERKALEMIA: ICD-10-CM

## 2022-06-30 LAB
ALBUMIN SERPL-MCNC: 4.2 G/DL (ref 3.4–5)
ANION GAP SERPL CALCULATED.3IONS-SCNC: 15 MMOL/L (ref 3–16)
BACTERIA: ABNORMAL /HPF
BASOPHILS ABSOLUTE: 0 K/UL (ref 0–0.2)
BASOPHILS RELATIVE PERCENT: 0.9 %
BILIRUBIN URINE: NEGATIVE
BLOOD, URINE: NEGATIVE
BUN BLDV-MCNC: 25 MG/DL (ref 7–20)
CALCIUM SERPL-MCNC: 9.9 MG/DL (ref 8.3–10.6)
CHLORIDE BLD-SCNC: 103 MMOL/L (ref 99–110)
CLARITY: ABNORMAL
CO2: 22 MMOL/L (ref 21–32)
COLOR: YELLOW
CREAT SERPL-MCNC: 1.3 MG/DL (ref 0.6–1.2)
CREATININE URINE: 128.4 MG/DL (ref 28–259)
EOSINOPHILS ABSOLUTE: 0.1 K/UL (ref 0–0.6)
EOSINOPHILS RELATIVE PERCENT: 2.4 %
EPITHELIAL CELLS, UA: 16 /HPF (ref 0–5)
GFR AFRICAN AMERICAN: 48
GFR NON-AFRICAN AMERICAN: 40
GLUCOSE BLD-MCNC: 148 MG/DL (ref 70–99)
GLUCOSE URINE: NEGATIVE MG/DL
HCT VFR BLD CALC: 32.8 % (ref 36–48)
HEMOGLOBIN: 10.3 G/DL (ref 12–16)
HYALINE CASTS: 1 /LPF (ref 0–8)
KETONES, URINE: NEGATIVE MG/DL
LEUKOCYTE ESTERASE, URINE: ABNORMAL
LYMPHOCYTES ABSOLUTE: 1 K/UL (ref 1–5.1)
LYMPHOCYTES RELATIVE PERCENT: 18 %
MAGNESIUM: 1.4 MG/DL (ref 1.8–2.4)
MCH RBC QN AUTO: 24.1 PG (ref 26–34)
MCHC RBC AUTO-ENTMCNC: 31.5 G/DL (ref 31–36)
MCV RBC AUTO: 76.6 FL (ref 80–100)
MICROSCOPIC EXAMINATION: YES
MONOCYTES ABSOLUTE: 0.4 K/UL (ref 0–1.3)
MONOCYTES RELATIVE PERCENT: 8 %
NEUTROPHILS ABSOLUTE: 3.7 K/UL (ref 1.7–7.7)
NEUTROPHILS RELATIVE PERCENT: 70.7 %
NITRITE, URINE: POSITIVE
PDW BLD-RTO: 16.5 % (ref 12.4–15.4)
PH UA: 5.5 (ref 5–8)
PHOSPHORUS: 3.1 MG/DL (ref 2.5–4.9)
PLATELET # BLD: 237 K/UL (ref 135–450)
PMV BLD AUTO: 8.2 FL (ref 5–10.5)
POTASSIUM SERPL-SCNC: 4.5 MMOL/L (ref 3.5–5.1)
PROTEIN PROTEIN: 29 MG/DL
PROTEIN UA: ABNORMAL MG/DL
PROTEIN/CREAT RATIO: 0.2 MG/DL
RBC # BLD: 4.28 M/UL (ref 4–5.2)
RBC UA: 1 /HPF (ref 0–4)
SODIUM BLD-SCNC: 140 MMOL/L (ref 136–145)
SPECIFIC GRAVITY UA: 1.02 (ref 1–1.03)
URINE TYPE: ABNORMAL
UROBILINOGEN, URINE: 0.2 E.U./DL
WBC # BLD: 5.3 K/UL (ref 4–11)
WBC UA: 162 /HPF (ref 0–5)

## 2022-07-05 ENCOUNTER — HOSPITAL ENCOUNTER (OUTPATIENT)
Dept: CT IMAGING | Age: 77
Discharge: HOME | End: 2022-07-05
Payer: MEDICARE

## 2022-07-05 DIAGNOSIS — C18.9 MALIGNANT NEOPLASM OF COLON, UNSPECIFIED PART OF COLON (HCC): ICD-10-CM

## 2022-07-05 PROCEDURE — 74176 CT ABD & PELVIS W/O CONTRAST: CPT

## 2022-07-05 PROCEDURE — 71250 CT THORAX DX C-: CPT

## 2022-07-06 PROBLEM — E66.3 OVERWEIGHT: Status: ACTIVE | Noted: 2022-07-06

## 2022-07-20 ENCOUNTER — HOSPITAL ENCOUNTER (OUTPATIENT)
Dept: RADIATION ONCOLOGY | Age: 77
Discharge: HOME | End: 2022-07-20
Payer: MEDICARE

## 2022-07-20 VITALS
TEMPERATURE: 97.3 F | DIASTOLIC BLOOD PRESSURE: 60 MMHG | BODY MASS INDEX: 37.69 KG/M2 | WEIGHT: 192 LBS | SYSTOLIC BLOOD PRESSURE: 124 MMHG | HEIGHT: 60 IN | OXYGEN SATURATION: 93 % | HEART RATE: 81 BPM

## 2022-07-20 PROCEDURE — 99205 OFFICE O/P NEW HI 60 MIN: CPT | Performed by: RADIOLOGY

## 2022-07-20 NOTE — CONSULTS
Radiation Oncology Consultation  Encounter Date: 2022 10:29 AM    Ms. Matti Diaz is a 67 y.o. female  : 1945  MRN: [id number]  Acct Number: [de-identified]  Requesting Provider: No att. providers found        CONSULTANT: Cyn Mathew MD    PHYSICIANS:   Primary Care: DO Sanaz Torrez M.D.  [address], [address] [facility]    Erna Solano MD      DIAGNOSIS:      Cancer Staging  Invasive ductal carcinoma of left breast [facility])  Staging form: Breast, AJCC 8th Edition  - Pathologic: Stage IA (pT1c, pN0, cM0, G2, ER+, AK+, HER2-) - Signed by Cyn Mathew MD on 2022         TREATMENT COURSE:  Oncology History   Cancer of sigmoid colon ([facility] [address]   2019 Initial Diagnosis    Cancer of sigmoid colon ([facility] [address]     Invasive ductal carcinoma of left breast ([facility] [address]   2022 -  Cancer Staged    Staging form: Breast, AJCC 8th Edition  - Pathologic: Stage IA (pT1c, pN0, cM0, G2, ER+, AK+, HER2-)             HPI:   Today I had the privilege of seeing Matti Diaz in consultation. As you recall, Matti Diaz is a 67 y.o. female who has a history of stage IIIb colon cancer for which she underwent sigmoid resection but was unable to tolerate adjuvant chemotherapy who was recently found to have an invasive left-sided breast cancer. She was previously in a fairly good state of health. She underwent mammography on 2022 at which time she was noted to have a 1 cm irregular hypoechoic mass at the 8 o'clock position of the left breast for which ultrasound-guided biopsy was recommended. The ultrasound-guided biopsy was obtained on 3/7/2022 revealing invasive grade 2 ductal carcinoma ER/AK positive HER2/madeline negative. On 4/15/2022, she underwent a left breast lumpectomy with sentinel node sampling.   On pathologic review the specimen, she was noted to have a 1.4 cm grade 2 invasive carcinoma with mucinous features along with grade 2 cribriform DCIS measuring 4 mm in greatest dimension. The margin was involved at the 6-9 o'clock region. All 3 sentinel nodes without evidence of metastasis. She then underwent a reexcision on 6/21/2022 with pathology showing no residual malignancy. She presents today for consideration of her treatment options. She reports the lesion was nonpalpable. She denies any associated bloody nipple discharge, skin redness, thickening, dimpling, or arm swelling. She was not experiencing any fevers, chills, or drenching night sweats. Her weight and appetite were stable. She has not noticed any new lumps or bumps anywhere throughout her body. She denies new onset of bony pain. Postoperatively, she reports she has been recovering well. Her energy level is back to her baseline. She has mild postoperative tenderness. She has been evaluated by Dr. Black who recommended an aromatase inhibitor, which she is scheduled to start this week.       Past Medical History:   Diagnosis Date    Abnormal EKG      see ekg report-11/4/2016-\"have appt 11/18/2016 to see Dr Breezy Blunt to get heart clearance for surgery - 11/21/2016\"    Anemia     Chemotherapy induced diarrhea     Chronic kidney disease, stage 3 (HCC)     Colon Cancer Dx 1-8-19    Colonoscopy, tx surg- following with Dr Black to start chemo    DVT (deep vein thrombosis) in pregnancy     Essential hypertension     History of blood transfusion 1960's    No Reaction To Blood Transfusion Received    Hx of blood clots     left leg    Hx of colonic polyp     Hyperlipidemia     Hypertension     Impaired fasting glucose     Malignant neoplasm of colon (HCC)     PONV (postoperative nausea and vomiting)     denies hx of motion sickness    Shortness of breath on exertion     Teeth missing     Upper And Lower    UTI (urinary tract infection) In Past    No Current Symptoms    Wears dentures     Full Upper, Partial Lower    Wears glasses     Wears partial dentures     Lower        Past Surgical History:   Procedure Laterality Date    APPENDECTOMY  1960    BACK SURGERY  2012    ACF C4, C5 With Hardware    BLADDER SUSPENSION  2002    Done With Vaginal Hysterectomy    CHOLECYSTECTOMY, LAPAROSCOPIC  11/21/2016    COLONOSCOPY  07/17/2013    Polyps x2, Internal hemorrhoids    COLONOSCOPY  01/08/2019    Ulcerated mass in sigmoid at 15 cm, Internal grade 1 hemorrhoids, otherwise normal to 40cm    COLONOSCOPY  10/17/2019    grade 1 int hem, s/p sigmoid resection, repeat in 3 years    COLONOSCOPY N/A 10/17/2019    COLORECTAL CANCER SCREENING, HIGH RISK performed by Pina Andrade MD at [address] [facility] N/A 08/12/2020    COLONOSCOPY POLYPECTOMY SNARE/COLD BIOPSY performed by Pina Andrade MD at [facility] Left 2016    Broken Left Wrist With Hardware    HYSTERECTOMY VAGINAL  2002    Bladder Suspension Also Done    INSERTION / REMOVAL / REPLACEMENT VENOUS ACCESS CATHETER Right 02/20/2019    PORT INSERTION performed by Vandana Workman MD at [facility]  2013    DISK HARDWARE    OVARY REMOVAL      SIGMOIDOSCOPY N/A 01/08/2019    SIGMOIDOSCOPY BIOPSY FLEXIBLE with tattoo performed by Pina Andrade MD at [address] N/A 01/16/2019    BOWEL RESECTION SIGMOID performed by Vandana Workman MD at [address] [facility]  Late 1970's    TUNNELED VENOUS PORT PLACEMENT  02/20/2019    US BREAST NEEDLE BIOPSY LEFT Left 03/07/2022    US BREAST NEEDLE BIOPSY LEFT 3/7/2022 Joelle Curry  [address]       Family History   Problem Relation Age of Onset    Diabetes Mother     Cancer Mother         \"Brain Cancer\"    Tuberculosis Father     Diabetes Sister     COPD Sister     Early Death Brother 61        Lung Cancer    Lung Cancer Brother     Early Death Daughter 47        Lung Cancer    Cancer Daughter         Lung Cancer    Cancer Daughter         Breast Cancer    Early Death Daughter 52        Breast Cancer    Breast Cancer Neg Hx        Social History Socioeconomic History    Marital status:      Spouse name: Not on file    Number of children: 3    Years of education: Not on file    Highest education level: Not on file   Occupational History    Not on file   Tobacco Use    Smoking status: Never    Smokeless tobacco: Never   Vaping Use    Vaping Use: Never used   Substance and Sexual Activity    Alcohol use: No    Drug use: No    Sexual activity: Not Currently   Other Topics Concern    Not on file   Social History Narrative    Not on file     Social Determinants of Health     Financial Resource Strain: Low Risk     Difficulty of Paying Living Expenses: Not hard at all   Food Insecurity: No Food Insecurity    Worried About Running Out of Food in the Last Year: Never true    Ran Out of Food in the Last Year: Never true   Transportation Needs: Not on file   Physical Activity: Not on file   Stress: Not on file   Social Connections: Not on file   Intimate Partner Violence: Not on file   Housing Stability: Not on file           ALLERGIES:   Allergies   Allergen Reactions    Neosporin [Neomycin-Polymyx-Gramicid] Swelling        Current Outpatient Medications   Medication Sig Dispense Refill    famotidine (PEPCID) 20 MG tablet TAKE 1 TABLET BY MOUTH TWICE DAILY 180 tablet 1    ondansetron (ZOFRAN) 8 MG tablet TK 1 T PO Q 8 H PRF NAUSEA OR VOM 30 tablet 1    sertraline (ZOLOFT) 100 MG tablet Take 1.5 tablets by mouth every morning 135 tablet 1    ferrous sulfate (IRON 325) 325 (65 Fe) MG tablet Take 1 tablet by mouth 3 times daily (with meals) 90 tablet 3    docusate sodium (COLACE) 100 MG capsule Take 1 capsule by mouth daily 30 capsule 3    atorvastatin (LIPITOR) 40 MG tablet Take 1 tablet by mouth nightly 90 tablet 1    dilTIAZem (CARDIZEM CD) 120 MG extended release capsule Take 2 capsules by mouth daily 180 capsule 3    Blood Pressure Monitoring (BLOOD PRESSURE KIT) JOSE DAVID Use as directed 1 Device 0    Acetaminophen (TYLENOL EX ST ARTHRITIS PAIN PO) Take by mouth daily as needed TAKE PRN       No current facility-administered medications for this encounter. No outpatient medications have been marked as taking for the 7/20/22 encounter [facility] Encounter) with [facility], [address] LABORATORY STUDIES:   CBC:   Lab Results   Component Value Date/Time    WBC 5.3 06/29/2022 11:12 AM    RBC 4.28 06/29/2022 11:12 AM    HGB 10.3 06/29/2022 11:12 AM    HCT 32.8 06/29/2022 11:12 AM    MCV 76.6 06/29/2022 11:12 AM    MCH 24.1 06/29/2022 11:12 AM    MCHC 31.5 06/29/2022 11:12 AM    RDW 16.5 06/29/2022 11:12 AM     06/29/2022 11:12 AM    MPV 8.2 06/29/2022 11:12 AM     CMP:  Lab Results   Component Value Date/Time     06/29/2022 11:12 AM     06/21/2021 11:14 AM    K 4.5 06/29/2022 11:12 AM     06/29/2022 11:12 AM    CO2 22 06/29/2022 11:12 AM    BUN 25 06/29/2022 11:12 AM    CREATININE 1.3 06/29/2022 11:12 AM    GFRAA 48 06/29/2022 11:12 AM    AGRATIO 2.4 03/24/2022 08:55 AM    LABGLOM 40 06/29/2022 11:12 AM    LABGLOM 33 03/24/2022 08:55 AM    GLUCOSE 148 06/29/2022 11:12 AM    PROT 6.8 03/24/2022 08:55 AM    LABALBU 4.2 06/29/2022 11:12 AM    CALCIUM 9.9 06/29/2022 11:12 AM    BILITOT 0.3 03/24/2022 08:55 AM    ALKPHOS 96 03/24/2022 08:55 AM    AST 16 03/24/2022 08:55 AM    ALT 11 03/24/2022 08:55 AM     Onc labs:   Lab Results   Component Value Date/Time    CEA 2.2 12/13/2019 12:44 PM           RADIOLOGIC STUDIES:     CT ABDOMEN PELVIS WO CONTRAST Additional Contrast? Radiologist Recommendation    Result Date: 7/8/2022  EXAMINATION: CT OF THE CHEST WITHOUT CONTRAST; CT OF THE ABDOMEN AND PELVIS WITHOUT CONTRAST 7/5/2022 11:05 am TECHNIQUE: CT of the chest was performed without the administration of intravenous contrast. Multiplanar reformatted images are provided for review.  Automated exposure control, iterative reconstruction, and/or weight based adjustment of the mA/kV was utilized to reduce the radiation dose to as low as reasonably prior lumpectomy. There is skin thickening involving the left breast partially visualized. No abnormal axillary adenopathy. Orthopedic hardware is partially visualized in the cervical spine. Osteopenia. No acute findings in the bones or soft tissues. Abdomen/Pelvis: Organs: The unenhanced liver, spleen, pancreas and adrenal glands are without focal abnormality. Status post cholecystectomy. There is a 5 mm nonobstructing left renal calculus. No hydronephrosis or perinephric stranding. GI/Bowel: Large hiatal hernia. No dilated loops of bowel or bowel wall thickening. No free air. Mild to moderate amount stool in the colon. The appendix is not visualized. Scattered colonic diverticula without acute diverticulitis. Pelvis: Status post hysterectomy. The bladder is decompressed. Small fat-containing bilateral inguinal hernias. No free fluid. No pathologically enlarged adenopathy. Peritoneum/Retroperitoneum: The aorta is normal in caliber with moderate atherosclerosis. No pathologically enlarged adenopathy. No ascites or drainable fluid collection. Bones/Soft Tissues: Osteopenia. No acute findings in the bones or soft tissues. 1. Stable 13 x 11 mm subsolid nodule in the left upper lobe with overall minimal growth when compared to 12/13/2019. Continued surveillance is recommended. 2. Postsurgical and post treatment changes within the left breast are partially visualized from recent lumpectomy and likely post radiation changes. There is a 9.6 x 6.2 cm fluid collection within the left breast, likely a postoperative seroma. 3. Otherwise no new findings of metastatic disease in the chest, abdomen or pelvis on this unenhanced study. 4. Nonobstructing left renal calculus. 5. Large hiatal hernia.      CT CHEST WO CONTRAST    Result Date: 7/8/2022  EXAMINATION: CT OF THE CHEST WITHOUT CONTRAST; CT OF THE ABDOMEN AND PELVIS WITHOUT CONTRAST 7/5/2022 11:05 am TECHNIQUE: CT of the chest was performed without the administration of intravenous contrast. Multiplanar reformatted images are provided for review. Automated exposure control, iterative reconstruction, and/or weight based adjustment of the mA/kV was utilized to reduce the radiation dose to as low as reasonably achievable.; CT of the abdomen and pelvis was performed without the administration of intravenous contrast. Multiplanar reformatted images are provided for review. Automated exposure control, iterative reconstruction, and/or weight based adjustment of the mA/kV was utilized to reduce the radiation dose to as low as reasonably achievable. COMPARISON: Mammogram/ultrasound 2/24/2022; CT 12/20/2021, 6/14/2021 and 12/13/2019 HISTORY: ORDERING SYSTEM PROVIDED HISTORY: Malignant neoplasm of colon, unspecified part of colon [facility]) TECHNOLOGIST PROVIDED HISTORY: Reason for Exam: Malignant neoplasm of colon, unspecified part of colon ([facility] [address] Additional signs and symptoms: Patient states cancer check;  diarrhea, constipation, and fatigue; patient states she no longer drinks oral contrast Relevant Medical/Surgical History: Patient states cancer check;  diarrhea, constipation, and fatigue; patient states she no longer drinks oral contrast; Recently diagnosed breast cancer. FINDINGS: Chest: Mediastinum:  The heart size is stable. Small pericardial effusion. The thoracic aorta is normal caliber with mild-to-moderate atherosclerosis. The main pulmonary artery is normal caliber. Coronary arterial calcifications. The esophagus is unremarkable. No new pathologically enlarged adenopathy. Lungs/pleura: Stable 13 x 11 mm subsolid nodule within the left upper lobe (image 29). There has been minimal growth when compared to 12/13/2019 where it measured 11 x 9 mm. There is a stable 4 mm solid nodule in the left lower lobe (image 72). No new focal consolidation, pleural effusion or pneumothorax. Calcified granuloma in the right middle lobe.   The central airways are otherwise patent. Soft Tissues/Bones: Right-sided chest port with the tip terminating in the mid SVC. Postsurgical changes are noted within the left breast.  Partially visualized is a 9.6 x 6.2 cm fluid collection within the medial right breast in keeping with history of prior lumpectomy. There is skin thickening involving the left breast partially visualized. No abnormal axillary adenopathy. Orthopedic hardware is partially visualized in the cervical spine. Osteopenia. No acute findings in the bones or soft tissues. Abdomen/Pelvis: Organs: The unenhanced liver, spleen, pancreas and adrenal glands are without focal abnormality. Status post cholecystectomy. There is a 5 mm nonobstructing left renal calculus. No hydronephrosis or perinephric stranding. GI/Bowel: Large hiatal hernia. No dilated loops of bowel or bowel wall thickening. No free air. Mild to moderate amount stool in the colon. The appendix is not visualized. Scattered colonic diverticula without acute diverticulitis. Pelvis: Status post hysterectomy. The bladder is decompressed. Small fat-containing bilateral inguinal hernias. No free fluid. No pathologically enlarged adenopathy. Peritoneum/Retroperitoneum: The aorta is normal in caliber with moderate atherosclerosis. No pathologically enlarged adenopathy. No ascites or drainable fluid collection. Bones/Soft Tissues: Osteopenia. No acute findings in the bones or soft tissues. 1. Stable 13 x 11 mm subsolid nodule in the left upper lobe with overall minimal growth when compared to 12/13/2019. Continued surveillance is recommended. 2. Postsurgical and post treatment changes within the left breast are partially visualized from recent lumpectomy and likely post radiation changes. There is a 9.6 x 6.2 cm fluid collection within the left breast, likely a postoperative seroma. 3. Otherwise no new findings of metastatic disease in the chest, abdomen or pelvis on this unenhanced study.  4. Nonobstructing left renal calculus. 5. Large hiatal hernia. US BREAST LIMITED LEFT    Narrative  EXAMINATION:  DIAGNOSTIC DIGITAL LEFT BREAST MAMMOGRAM WITH TOMOSYNTHESIS; TARGETED  ULTRASOUND OF THE LEFT BREAST    2/24/2022 10:29 am    TECHNIQUE:  Diagnostic mammography of the left breast was performed with tomosynthesis. 2D standard and 3D tomosynthesis combination imaging performed through the  left breast.  Computer aided detection was utilized in the interpretation of  this exam.; Target ultrasound of the left breast was performed. Views: Spot compression CC and MLO views of the left breast as well as  standard true lateral view of the left breast were obtained. COMPARISON:  February 21, 2022    HISTORY:  ORDERING SYSTEM PROVIDED HISTORY:  Abnormal mammogram    FINDINGS:  Heterogeneously dense fibroglandular tissue is noted throughout the left  breast.  Previously noted focal asymmetry in the inferior medial left breast  persist on spot compression views. Targeted left breast ultrasound follows. Targeted left breast ultrasound was performed. There is a 1 x 1 x 0.9 cm  irregular, hypoechoic mass at the 8 o'clock position, 12 cm from nipple  correlating to the mammographic abnormality. Mild internal vascularity is  noted. Mild posterior acoustic shadowing is noted. Areas of probable duct  ectasia are identified in the medial right breast, likely corresponding to  the additional mammographic asymmetry. There is no left axillary  lymphadenopathy. Impression  1. 1 cm irregular, hypoechoic mass at the 8 o'clock position of the left  breast for which further evaluation with ultrasound-guided biopsy is  recommended. BIRADS:  BIRADS - CATEGORY 5    Highly Suggestive for Malignancy. Biopsy should be considered at this time. OVERALL ASSESSMENT - Highly suggestive of malignancy. A letter of notification will be sent to the patient regarding the results.     My findings and recommendations were discussed with the patient at the time  of service. A representative from the radiology department will be contacting your office  and assisting the patient in getting appropriate follow-up. TIM DIGITAL SCREEN W OR WO CAD BILATERAL    Narrative  EXAMINATION:  BILATERAL DIGITAL SCREENING MAMMOGRAM, 2/21/2022    TECHNIQUE:  CC and MLO views of the left and right breasts were obtained. Computer aided  detection was utilized in the interpretation of this exam.    COMPARISON:  None    HISTORY:  Screening. FINDINGS:  Symmetrically distributed foci of moderately dense residual fibroglandular  breast tissue noted bilaterally. Mammographically benign appearing  calcifications also seen throughout both breasts. Asymmetric subcentimeter poorly defined nodular densities noted in the 7-8  o'clock position posterior 3rd of the left breast measuring approximately 9  mm in greatest dimension. Spot compression views and left breast ultrasound  from 6 through 9 o'clock positions with special attention to the posterior  3rd of the left breast recommended for further evaluation. Impression  BI-RADS category [id number]-defined asymmetric subcentimeter nodular  densities inferior-medial aspect of the left breast proximally 7-8 o'clock  positions. Spot compression views and left breast ultrasound from 6 through  9 o'clock position recommended for further evaluation. BIRADS:  0-incomplete       CT CHEST WO CONTRAST    Narrative  EXAMINATION:  CT OF THE CHEST WITHOUT CONTRAST; CT OF THE ABDOMEN AND PELVIS WITHOUT  CONTRAST 7/5/2022 11:05 am    TECHNIQUE:  CT of the chest was performed without the administration of intravenous  contrast. Multiplanar reformatted images are provided for review.  Automated  exposure control, iterative reconstruction, and/or weight based adjustment of  the mA/kV was utilized to reduce the radiation dose to as low as reasonably  achievable.; CT of the abdomen and pelvis was performed without the  administration of intravenous contrast. Multiplanar reformatted images are  provided for review. Automated exposure control, iterative reconstruction,  and/or weight based adjustment of the mA/kV was utilized to reduce the  radiation dose to as low as reasonably achievable. COMPARISON:  Mammogram/ultrasound 2/24/2022; CT 12/20/2021, 6/14/2021 and 12/13/2019    HISTORY:  ORDERING SYSTEM PROVIDED HISTORY: Malignant neoplasm of colon, unspecified  part of colon [facility])  TECHNOLOGIST PROVIDED HISTORY:  Reason for Exam: Malignant neoplasm of colon, unspecified part of colon ([facility] [address]  Additional signs and symptoms: Patient states cancer check;  diarrhea,  constipation, and fatigue; patient states she no longer drinks oral contrast  Relevant Medical/Surgical History: Patient states cancer check;  diarrhea,  constipation, and fatigue; patient states she no longer drinks oral contrast;    Recently diagnosed breast cancer. FINDINGS:    Chest:    Mediastinum:  The heart size is stable. Small pericardial effusion. The  thoracic aorta is normal caliber with mild-to-moderate atherosclerosis. The  main pulmonary artery is normal caliber. Coronary arterial calcifications. The esophagus is unremarkable. No new pathologically enlarged adenopathy. Lungs/pleura: Stable 13 x 11 mm subsolid nodule within the left upper lobe  (image 29). There has been minimal growth when compared to 12/13/2019 where  it measured 11 x 9 mm. There is a stable 4 mm solid nodule in the left lower  lobe (image 72). No new focal consolidation, pleural effusion or  pneumothorax. Calcified granuloma in the right middle lobe. The central  airways are otherwise patent. Soft Tissues/Bones: Right-sided chest port with the tip terminating in the  mid SVC.   Postsurgical changes are noted within the left breast.  Partially  visualized is a 9.6 x 6.2 cm fluid collection within the medial right breast  in keeping with history of prior lumpectomy. There is skin thickening  involving the left breast partially visualized. No abnormal axillary  adenopathy. Orthopedic hardware is partially visualized in the cervical  spine. Osteopenia. No acute findings in the bones or soft tissues. Abdomen/Pelvis:    Organs: The unenhanced liver, spleen, pancreas and adrenal glands are without  focal abnormality. Status post cholecystectomy. There is a 5 mm  nonobstructing left renal calculus. No hydronephrosis or perinephric  stranding. GI/Bowel: Large hiatal hernia. No dilated loops of bowel or bowel wall  thickening. No free air. Mild to moderate amount stool in the colon. The  appendix is not visualized. Scattered colonic diverticula without acute  diverticulitis. Pelvis: Status post hysterectomy. The bladder is decompressed. Small  fat-containing bilateral inguinal hernias. No free fluid. No pathologically  enlarged adenopathy. Peritoneum/Retroperitoneum: The aorta is normal in caliber with moderate  atherosclerosis. No pathologically enlarged adenopathy. No ascites or drainable fluid  collection. Bones/Soft Tissues: Osteopenia. No acute findings in the bones or soft  tissues. Impression  1. Stable 13 x 11 mm subsolid nodule in the left upper lobe with overall  minimal growth when compared to 12/13/2019. Continued surveillance is  recommended. 2. Postsurgical and post treatment changes within the left breast are  partially visualized from recent lumpectomy and likely post radiation  changes. There is a 9.6 x 6.2 cm fluid collection within the left breast,  likely a postoperative seroma. 3. Otherwise no new findings of metastatic disease in the chest, abdomen or  pelvis on this unenhanced study. 4. Nonobstructing left renal calculus. 5. Large hiatal hernia. REVIEW OF SYSTEMS:   Constitutional:  Patient denies fevers, rigors, or drenching night sweats.   The patient's weight and appetite have been stable. Eyes:  The patient denies any recent visual changes, diplopia, or cataracts  ENMT:  The patient denies any ear pain, hearing changes, or nosebleeds  Respiratory:  The patient denies any SOB, hemoptysis, or green sputum production  Cardiovascular: The patient denies any chest pain or fainting spells. +/- ankle swelling  GI:  Patient denies nausea, vomiting, melena, or hematochezia. +/-diarrhea  : Patient denies dysuria, hematuria. +/- urinary incontinence. Integumentary: patient denies skin rashes, pruritis, or arm edema  Endocrine:  Patient denies any diabetic or thyroid problems  Neurologic: Patient denies headaches, focal weakness, or disorientation  Psychiatric: The patient denies any depression, anxiety, or rapid mood swings. PHYSICAL EXAMINATION:   VITAL SIGNS: /60   Pulse 81   Temp 97.3 °F (36.3 °C) (Infrared)   Ht 5' (1.524 m)   Wt 192 lb (87.1 kg)   SpO2 93%   BMI 37.50 kg/m²   ECOG PERFORMANCE STATUS: 0  PAIN RATIN    GENERAL: Pleasant well-developed adult in no acute distress. Alert and oriented ×3  SKIN: Warm and dry, without jaundice, ecchymoses, or petechiae. HEENT: Normocephalic, atraumatic. Pupils are round and symmetric. Sclerae anicteric  NECK:  No JVD; Supple. No cervical, supraclavicular, or infraclavicular lymphadenopathy is palpable. A Mediport is palpable in the right infraclavicular region  LUNGS: Bilaterally clear to auscultation. No rales, rhonci, or wheezing are present. Good inspiratory effort, no accessory muscle use. CARDIAC: Regular rate and rhythm, without murmurs, clicks, or gallops   Breast examination: Left breast: Healing surgical incision medially without signs of infection. Minimal fluid loculation. No suspicious masses on palpation. Right breast: No masses are palpable. No axillary lymphadenopathy is present. ABDOMEN: Normoactive bowels sounds are present. Soft. Non-tender and non-distended.   No hepatosplenomegaly or masses are present. EXTREMITIES: No cyanosis, clubbing or edema is present. FROM  NEUROLOGIC: Gait unremarkable. The patient is alert and oriented. CN II-XII are grossly intact. Sensation to light touch is intact and symmetric in the fingers and feet. Muscle strength is 5/5 in both the upper and lower extremities. IMPRESSION/PLAN:  Lorenza Chris is a 67 y.o. female who was recently found to have a stage Ia invasive left-sided breast cancer ER/AK positive HER2/madeline negative status post breast conserving surgery. I have reviewed the patient's radiographic images. The treatment options were discussed in detail with the patient. The randomized trials of breast conserving surgery with hormonal therapy with or without radiation therapy were discussed in detail. She was informed that the addition of radiation would improve her local control rates, but was unlikely to change her overall survival.  The potential acute side effects and chronic complications of radiation therapy to the left breast were discussed in detail with the patient. The patient voiced understanding, and the patient's questions were answered. The patient has decided she wishes to proceed with hormonal therapy alone at this time and omit radiation. She is to contact me with any additional questions or concerns, or should she change her mind. She is to continue to follow with Dr. Black and Dr. Eddie West regularly. Thank-you for allowing me to participate in the care of this very pleasant patient.           MEDICAL DECISION MAKING    Number/Complexity of Problems Addressed  [] 1 self-limited of minor problem (87339/93018)  [] >=2 self-limited or minor problems, 1 stable chronic illness, 1 acute/uncomplicated illness/injury (30428/04444)  [x]Chronic illnesses with exacerbation/progression/side effects of treatment, >=2 stable chronic illnesses, 1 undiagnosed new problem with uncertain prognosis, 1 acute illness with systemic symptoms, 1 acute complicated injury (43022/68989)  []Chronic illnesses with severe exacerbation/progression/treatment side effects, acute or chronic illness or injury that poses a threat to life or bodily function (73994/36622)    Amount and/or Complexity of Data Reviewed  [] Minimal or none (23118/64248)  [] Limited - meets 1/2 categories (21698/96699)  At least 2 of: review of prior external notes from each unique source, review results of each unique test, ordering of each unique test  Assessment requiring an independent historian  [] Moderate - meets 1/3 categories (63227/55580)  Any 3 of: Review of prior external notes from each unique source, review results of each unique test, ordering of each unique test, assessment requiring an independent historian  Independent interpretation of tests  Discussion of management or test interpretation  [x] Extensive - meets 2/3 categories (60898/99576)  Any 3 of: Review of prior external notes from each unique source, review results of each unique test, ordering of each unique test, assessment requiring an independent historian  Independent interpretation of tests  Discussion of management or test interpretation    Risk of complications and/or morbidity/mortality of patient management  [] Minimal (86891/19313)  [] Low (79877/40992)  [] Moderate (98821/52555)  Examples: Rx drug management, decision regarding minor surgery with identified patient or procedure risk factors, decision regarding elective major surgery without identified patient or procedure risk factors, diagnosis or treatment significantly limited by social determinants of health  [x] High (10293/67040)  Examples: drug therapy requiring intensive monitoring for toxicity, decision regarding elective major surgery with identified patient/procedure risk factors, decision regarding emergency major surgery, decision regarding hospitalization, decision for DNR or de-escalation of care because of poor prognosis      LEVEL OF MDM (based

## 2022-07-20 NOTE — PROGRESS NOTES
Khoa Edwards  7/20/2022    CONSULT     Vitals:    07/20/22 0948   BP: 124/60   Pulse: 81   Temp: 97.3 °F (36.3 °C)   SpO2: 93%        Oxygen Therapy  SpO2: 93 %  Pulse Oximetry Type: Intermittent  Pulse Oximeter Device Mode: Intermittent  Pulse Oximeter Device Location: Left, Finger  O2 Device: None (Room air)  Skin Assessment: Clean, dry, & intact    Wt Readings from Last 3 Encounters:   07/20/22 192 lb (87.1 kg)   07/06/22 194 lb 6.4 oz (88.2 kg)   07/06/22 197 lb (89.4 kg)        Pain Assessment  Pain Assessment: None - Denies Pain  Denies Need for Intervention    Fall Risk:    Not currently a fall risk  Re-Evaluate Weekly    Nutritional Alteration:  None  No Difficulties Swallowing  Voices Sufficient Oral Intake    Mediport: yes, Right side    Pacemaker/ICD: no    Implants: yes, hardware in left wrist and neck    Previous XRT: no    Assessment:  Here for consult to discuss Radiation Therapy options.        Past Medical History:   Diagnosis Date    Abnormal EKG      see ekg report-11/4/2016-\"have appt 11/18/2016 to see Dr Stephen Doran to get heart clearance for surgery - 11/21/2016\"    Anemia     Chemotherapy induced diarrhea     Chronic kidney disease, stage 3 (HCC)     Colon Cancer Dx 1-8-19    Colonoscopy, tx surg- following with Dr Balck to start chemo    DVT (deep vein thrombosis) in pregnancy     Essential hypertension     History of blood transfusion 1960's    No Reaction To Blood Transfusion Received    Hx of blood clots     left leg    Hx of colonic polyp     Hyperlipidemia     Hypertension     Impaired fasting glucose     Malignant neoplasm of colon (HCC)     PONV (postoperative nausea and vomiting)     denies hx of motion sickness    Shortness of breath on exertion     Teeth missing     Upper And Lower    UTI (urinary tract infection) In Past    No Current Symptoms    Wears dentures     Full Upper, Partial Lower    Wears glasses     Wears partial dentures     Lower       Past Surgical History:   Procedure Laterality Date    APPENDECTOMY  1960    BACK SURGERY  2012    ACF C4, C5 With Hardware    BLADDER SUSPENSION  2002    Done With Vaginal Hysterectomy    CHOLECYSTECTOMY, LAPAROSCOPIC  11/21/2016    COLONOSCOPY  7-17-13    Polyps x2, Internal hemorrhoids    COLONOSCOPY  01/08/2019    Ulcerated mass in sigmoid at 15 cm, Internal grade 1 hemorrhoids, otherwise normal to 40cm    COLONOSCOPY  10/17/2019    grade 1 int hem, s/p sigmoid resection, repeat in 3 years    COLONOSCOPY N/A 10/17/2019    COLORECTAL CANCER SCREENING, HIGH RISK performed by Telma Hooper MD at [address] [facility] N/A 8/12/2020    COLONOSCOPY POLYPECTOMY SNARE/COLD BIOPSY performed by Telma Hooper MD at [address] [facility] Left 2016    Broken Left Wrist With Hardware    HYSTERECTOMY VAGINAL  2002    Bladder Suspension Also Done    INSERTION / REMOVAL / REPLACEMENT VENOUS ACCESS CATHETER Right 2/20/2019    PORT INSERTION performed by Dariel Birch MD at [place] [address] N/A 1/8/2019    SIGMOIDOSCOPY BIOPSY FLEXIBLE with tattoo performed by Telma Hooper MD at [address] [facility] N/A 1/16/2019    BOWEL RESECTION SIGMOID performed by Dariel Birch MD at [address] [facility]  Late 1970's    TUNNELED VENOUS PORT PLACEMENT  02/20/2019    US BREAST NEEDLE BIOPSY LEFT Left 3/7/2022    US BREAST NEEDLE BIOPSY LEFT 3/7/2022 Aby Gilman MD [address] [facility]       Allergies   Allergen Reactions    Neosporin [Neomycin-Polymyx-Gramicid] Swelling          Current Outpatient Medications:     famotidine (PEPCID) 20 MG tablet, TAKE 1 TABLET BY MOUTH TWICE DAILY, Disp: 180 tablet, Rfl: 1    ondansetron (ZOFRAN) 8 MG tablet, TK 1 T PO Q 8 H PRF NAUSEA OR VOM, Disp: 30 tablet, Rfl: 1    sertraline (ZOLOFT) 100 MG tablet, Take 1.5 tablets by mouth every morning, Disp: 135 tablet, Rfl: 1    ferrous sulfate (IRON 325) 325 (65 Fe) MG tablet, Take 1 tablet by mouth 3 times

## 2022-08-19 ENCOUNTER — TELEPHONE (OUTPATIENT)
Dept: FAMILY MEDICINE CLINIC | Age: 77
End: 2022-08-19

## 2022-08-19 DIAGNOSIS — U07.1 COVID-19: Primary | ICD-10-CM

## 2022-08-19 NOTE — TELEPHONE ENCOUNTER
----- Message from [employer] sent at 8/19/2022 10:27 AM EDT -----  Subject: Message to Provider    QUESTIONS  Information for Provider? Patient test positive for COVID-19 on 8/19. Symptoms? runny nose, sore throat, diarrhea & throwing up. Pharmacy? [facility] in [facility]. Please call patient back. ---------------------------------------------------------------------------  --------------  Jared MENSAH  [phone]; OK to leave message on voicemail  ---------------------------------------------------------------------------  --------------  SCRIPT ANSWERS  Relationship to Patient?  Self

## 2022-08-20 RX ORDER — NIRMATRELVIR AND RITONAVIR 150-100 MG
KIT ORAL
Qty: 20 TABLET | Refills: 0 | Status: SHIPPED | OUTPATIENT
Start: 2022-08-20 | End: 2022-08-21 | Stop reason: CLARIF

## 2022-08-20 NOTE — PROGRESS NOTES
Patient called reporting that she tested positive for COVID-19 yesterday via home test. Symptoms include nasal and sinus congestion, headache, rhinorrhea. Denies shortness of breath. Most recent GFR 40. Return call placed to patient instructed patient to stop Lipitor. Educated patient about Paxlovid, with all questions answered at this time. Paxlovid sent to [facility] on [address] [facility].

## 2022-08-21 DIAGNOSIS — U07.1 COVID-19: Primary | ICD-10-CM

## 2022-09-13 ENCOUNTER — OFFICE VISIT (OUTPATIENT)
Dept: FAMILY MEDICINE CLINIC | Age: 77
End: 2022-09-13
Payer: MEDICARE

## 2022-09-13 VITALS
HEART RATE: 82 BPM | RESPIRATION RATE: 16 BRPM | HEIGHT: 60 IN | OXYGEN SATURATION: 96 % | BODY MASS INDEX: 37.07 KG/M2 | DIASTOLIC BLOOD PRESSURE: 70 MMHG | WEIGHT: 188.8 LBS | SYSTOLIC BLOOD PRESSURE: 124 MMHG

## 2022-09-13 DIAGNOSIS — K59.00 CONSTIPATION, UNSPECIFIED CONSTIPATION TYPE: ICD-10-CM

## 2022-09-13 DIAGNOSIS — F32.5 MAJOR DEPRESSIVE DISORDER WITH SINGLE EPISODE, IN FULL REMISSION (HCC): Primary | ICD-10-CM

## 2022-09-13 DIAGNOSIS — E78.49 OTHER HYPERLIPIDEMIA: ICD-10-CM

## 2022-09-13 DIAGNOSIS — K21.9 GASTROESOPHAGEAL REFLUX DISEASE, UNSPECIFIED WHETHER ESOPHAGITIS PRESENT: ICD-10-CM

## 2022-09-13 DIAGNOSIS — R60.9 PERIPHERAL EDEMA: ICD-10-CM

## 2022-09-13 DIAGNOSIS — I10 ESSENTIAL HYPERTENSION: ICD-10-CM

## 2022-09-13 PROCEDURE — 1124F ACP DISCUSS-NO DSCNMKR DOCD: CPT | Performed by: STUDENT IN AN ORGANIZED HEALTH CARE EDUCATION/TRAINING PROGRAM

## 2022-09-13 PROCEDURE — 99213 OFFICE O/P EST LOW 20 MIN: CPT | Performed by: STUDENT IN AN ORGANIZED HEALTH CARE EDUCATION/TRAINING PROGRAM

## 2022-09-13 RX ORDER — LOPERAMIDE HYDROCHLORIDE 2 MG/1
2 CAPSULE ORAL 4 TIMES DAILY PRN
COMMUNITY
End: 2022-09-13 | Stop reason: SDUPTHER

## 2022-09-13 RX ORDER — LOPERAMIDE HYDROCHLORIDE 2 MG/1
2 CAPSULE ORAL 4 TIMES DAILY PRN
Qty: 120 CAPSULE | Refills: 1 | Status: SHIPPED | OUTPATIENT
Start: 2022-09-13

## 2022-09-13 RX ORDER — ANASTROZOLE 1 MG/1
1 TABLET ORAL DAILY
COMMUNITY
End: 2022-10-10

## 2022-09-13 RX ORDER — LOPERAMIDE HYDROCHLORIDE 2 MG/1
CAPSULE ORAL
Qty: 360 CAPSULE | OUTPATIENT
Start: 2022-09-13

## 2022-09-13 RX ORDER — ATORVASTATIN CALCIUM 40 MG/1
40 TABLET, FILM COATED ORAL NIGHTLY
Qty: 90 TABLET | Refills: 1 | Status: SHIPPED | OUTPATIENT
Start: 2022-09-13

## 2022-09-13 ASSESSMENT — ENCOUNTER SYMPTOMS
NAUSEA: 0
WHEEZING: 0
SHORTNESS OF BREATH: 0
SORE THROAT: 0
ABDOMINAL PAIN: 0

## 2022-09-13 NOTE — PROGRESS NOTES
9/13/2022    [facility]    Chief Complaint   Patient presents with    3 Month Follow-Up     4 month ck   - left foot swelling        HPI  History was obtained from patient. Roberto Lozano is a 67 y.o. female with a PMHx as listed below who presents today for     Following with Dr. Mercy Saint, declines RT. Following with Dr. Pruett Coffee as well    Tolerating Iron, no constipation    Mild swelling legs. Off diuretics ckd    1. Major depressive disorder with single episode, in full remission ([address]    2. Essential hypertension    3. Gastroesophageal reflux disease, unspecified whether esophagitis present    4. Other hyperlipidemia    5. Constipation, unspecified constipation type    6. Peripheral edema             REVIEW OF SYMPTOMS    Review of Systems   Constitutional:  Negative for chills and fatigue. HENT:  Negative for congestion and sore throat. Respiratory:  Negative for shortness of breath and wheezing. Cardiovascular:  Negative for chest pain and palpitations. Gastrointestinal:  Negative for abdominal pain and nausea. Genitourinary:  Negative for frequency and urgency. Neurological:  Negative for light-headedness.      PAST MEDICAL HISTORY  Past Medical History:   Diagnosis Date    Abnormal EKG      see ekg report-11/4/2016-\"have appt 11/18/2016 to see Dr Isis Hanson to get heart clearance for surgery - 11/21/2016\"    Anemia     Chemotherapy induced diarrhea     Chronic kidney disease, stage 3 (HCC)     Colon Cancer Dx 1-8-19    Colonoscopy, tx surg- following with Dr Black to start chemo    DVT (deep vein thrombosis) in pregnancy     Essential hypertension     History of blood transfusion 1960's    No Reaction To Blood Transfusion Received    Hx of blood clots     left leg    Hx of colonic polyp     Hyperlipidemia     Hypertension     Impaired fasting glucose     Malignant neoplasm of colon (HCC)     PONV (postoperative nausea and vomiting)     denies hx of motion sickness    Shortness of breath on exertion     Teeth missing     Upper And Lower    UTI (urinary tract infection) In Past    No Current Symptoms    Wears dentures     Full Upper, Partial Lower    Wears glasses     Wears partial dentures     Lower       FAMILY HISTORY  Family History   Problem Relation Age of Onset    Diabetes Mother     Cancer Mother         \"Brain Cancer\"    Tuberculosis Father     Diabetes Sister     COPD Sister     Early Death Brother 61        Lung Cancer    Lung Cancer Brother     Early Death Daughter 47        Lung Cancer    Cancer Daughter         Lung Cancer    Cancer Daughter         Breast Cancer    Early Death Daughter 52        Breast Cancer    Breast Cancer Neg Hx        SOCIAL HISTORY  Social History     Socioeconomic History    Marital status:     Number of children: 3   Tobacco Use    Smoking status: Never    Smokeless tobacco: Never   Vaping Use    Vaping Use: Never used   Substance and Sexual Activity    Alcohol use: No    Drug use: No    Sexual activity: Not Currently     Social Determinants of Health     Financial Resource Strain: Low Risk     Difficulty of Paying Living Expenses: Not hard at all   Food Insecurity: No Food Insecurity    Worried About Running Out of Food in the Last Year: Never true    Ran Out of Food in the Last Year: Never true        SURGICAL HISTORY  Past Surgical History:   Procedure Laterality Date    APPENDECTOMY  1960    BACK SURGERY  2012    ACF C4, C5 With Hardware    BLADDER SUSPENSION  2002    Done With Vaginal Hysterectomy    CHOLECYSTECTOMY, LAPAROSCOPIC  11/21/2016    COLONOSCOPY  07/17/2013    Polyps x2, Internal hemorrhoids    COLONOSCOPY  01/08/2019    Ulcerated mass in sigmoid at 15 cm, Internal grade 1 hemorrhoids, otherwise normal to 40cm    COLONOSCOPY  10/17/2019    grade 1 int hem, s/p sigmoid resection, repeat in 3 years    COLONOSCOPY N/A 10/17/2019    COLORECTAL CANCER SCREENING, HIGH RISK performed by Hernandez Savage MD at [address] [facility] N/A 08/12/2020    COLONOSCOPY POLYPECTOMY SNARE/COLD BIOPSY performed by Nik Zheng MD at [address] [facility] Left 2016    Broken Left Wrist With Hardware    HYSTERECTOMY VAGINAL  2002    Bladder Suspension Also Done    INSERTION / REMOVAL / REPLACEMENT VENOUS ACCESS CATHETER Right 02/20/2019    PORT INSERTION performed by Shane Arnold MD at [address] [facility]  2013    DISK HARDWARE    OVARY REMOVAL      SIGMOIDOSCOPY N/A 01/08/2019    SIGMOIDOSCOPY BIOPSY FLEXIBLE with tattoo performed by Nik Zheng MD at [address] N/A 01/16/2019    BOWEL RESECTION SIGMOID performed by Shane Arnold MD at [address] [facility]  Late 1970's    TUNNELED VENOUS PORT PLACEMENT  02/20/2019    US BREAST NEEDLE BIOPSY LEFT Left 03/07/2022    US BREAST NEEDLE BIOPSY LEFT 3/7/2022 Nicholas Gabriel MD [address] [facility]                 CURRENT MEDICATIONS  Current Outpatient Medications   Medication Sig Dispense Refill    anastrozole (ARIMIDEX) 1 MG tablet Take 1 mg by mouth daily      atorvastatin (LIPITOR) 40 MG tablet Take 1 tablet by mouth nightly 90 tablet 1    loperamide (IMODIUM) 2 MG capsule Take 1 capsule by mouth 4 times daily as needed for Diarrhea 120 capsule 1    famotidine (PEPCID) 20 MG tablet TAKE 1 TABLET BY MOUTH TWICE DAILY 180 tablet 1    ondansetron (ZOFRAN) 8 MG tablet TK 1 T PO Q 8 H PRF NAUSEA OR VOM 30 tablet 1    sertraline (ZOLOFT) 100 MG tablet Take 1.5 tablets by mouth every morning 135 tablet 1    ferrous sulfate (IRON 325) 325 (65 Fe) MG tablet Take 1 tablet by mouth 3 times daily (with meals) 90 tablet 3    docusate sodium (COLACE) 100 MG capsule Take 1 capsule by mouth daily 30 capsule 3    dilTIAZem (CARDIZEM CD) 120 MG extended release capsule Take 2 capsules by mouth daily 180 capsule 3    Acetaminophen (TYLENOL EX ST ARTHRITIS PAIN PO) Take by mouth daily as needed TAKE PRN      Blood Pressure Monitoring (BLOOD PRESSURE KIT) JOSE DAVID Use as directed 1 Device 0 No current facility-administered medications for this visit. ALLERGIES  Allergies   Allergen Reactions    Neosporin [Neomycin-Polymyx-Gramicid] Swelling       PHYSICAL EXAM    /70 (Site: Left Upper Arm, Position: Sitting, Cuff Size: Large Adult)   Pulse 82   Resp 16   Ht 5' (1.524 m)   Wt 188 lb 12.8 oz (85.6 kg)   SpO2 96%   BMI 36.87 kg/m²     Physical Exam  Constitutional:       Appearance: Normal appearance. HENT:      Head: Normocephalic and atraumatic. Eyes:      Extraocular Movements: Extraocular movements intact. Pupils: Pupils are equal, round, and reactive to light. Cardiovascular:      Rate and Rhythm: Normal rate and regular rhythm. Pulses: Normal pulses. Dorsalis pedis pulses are 2+ on the right side and 2+ on the left side. Heart sounds: No murmur heard. No friction rub. No gallop. Comments: 1+ pitting edema up to high ankle Lt>Rt  Skin:     General: Skin is warm and dry. Neurological:      General: No focal deficit present. Mental Status: She is alert. Psychiatric:         Mood and Affect: Mood normal.         Behavior: Behavior normal.         ASSESSMENT & PLAN    1. Major depressive disorder with single episode, in full remission ([facility] [address]      2. Essential hypertension      3. Gastroesophageal reflux disease, unspecified whether esophagitis present      4. Other hyperlipidemia    - atorvastatin (LIPITOR) 40 MG tablet; Take 1 tablet by mouth nightly  Dispense: 90 tablet; Refill: 1  - LIPID PANEL; Future    5. Constipation, unspecified constipation type    - loperamide (IMODIUM) 2 MG capsule; Take 1 capsule by mouth 4 times daily as needed for Diarrhea  Dispense: 120 capsule; Refill: 1    6. Peripheral edema      Conservative management b/l swelling  Obtain lipid panel next blood draw  GERD stable  BP stable  Mood stable  Continue to follow with Oncology  Following with nephrology    Return in about 6 months (around 3/13/2023). Electronically signed by Rustam Berkowitz DO on 9/13/2022

## 2022-09-27 ENCOUNTER — TELEPHONE (OUTPATIENT)
Dept: PHARMACY | Facility: CLINIC | Age: 77
End: 2022-09-27

## 2022-09-27 NOTE — TELEPHONE ENCOUNTER
[facility] CLINICAL PHARMACY: ADHERENCE REVIEW  Identified care gap per [employer]: fills at [facility]: Statin adherence    Last Visit: 09.13.22      [address] Records claims through 09.24.22 (Prior Year [place] = PASSED; YTD Vicente Bonilla =  80%; Potential Fail Date: 10.01.22 ):   Atorvastatin last filled on 05.20.22 for 90 day supply. Next refill due: 08.18.22    Per  [employer] Portal:  Atorvastatin last filled on 09.13.22 for 90 day supply. Lab Results   Component Value Date    CHOL 175 07/26/2021    TRIG 143 07/26/2021    HDL 45 07/26/2021    LDLCALC 101 (H) 07/26/2021     ALT   Date Value Ref Range Status   03/24/2022 11 0 - 60 U/L Final     AST   Date Value Ref Range Status   03/24/2022 16 0 - 55 U/L Final     The 10-year ASCVD risk score (Pati WARNER, et al., 2019) is: 18.3%    Values used to calculate the score:      Age: 67 years      Sex: Female      Is Non- : No      Diabetic: No      Tobacco smoker: No      Systolic Blood Pressure: 890 mmHg      Is BP treated: No      HDL Cholesterol: 45 mg/dL      Total Cholesterol: 175 mg/dL     PLAN  The following are interventions that have been identified:   - patient recently received 90 day supply Atorvastatin. No patient out reach planned at this time.          Future Appointments   Date Time Provider Tova Shaw   10/10/2022 10:00 AM Deng Donovan MD [facility]   1/6/2023 10:30 AM Brandee Mcguire MD [facility]   1/26/2023  3:00 PM [facility] FPS AWV LPN [facility] FPS [facility]   2/50/9957  8:15 AM Luis Eduardo James DO [facility] FPS MMA       [place] [address]   [facility]  Phone: toll free [phone]

## 2022-11-16 DIAGNOSIS — K21.9 GASTROESOPHAGEAL REFLUX DISEASE, UNSPECIFIED WHETHER ESOPHAGITIS PRESENT: ICD-10-CM

## 2022-11-16 RX ORDER — FAMOTIDINE 20 MG/1
TABLET, FILM COATED ORAL
Qty: 180 TABLET | Refills: 1 | Status: SHIPPED | OUTPATIENT
Start: 2022-11-16

## 2022-12-29 ENCOUNTER — TELEPHONE (OUTPATIENT)
Dept: PHARMACY | Facility: CLINIC | Age: 77
End: 2022-12-29

## 2022-12-29 NOTE — TELEPHONE ENCOUNTER
[facility] HEALTH CLINICAL PHARMACY: ADHERENCE REVIEW  Identified care gap per [employer]: fills at [facility]: Statin adherence    Last Visit: 22    ASSESSMENT  STATIN ADHERENCE    Cristopher date 22  ATORVASTATIN TAB 40MG last filled on 22 for 90 day supply. Next refill due: 22    Per [facility]:   ATORVASTATIN TAB 40MG last picked up on 22 for 90 day supply. 1 refills remaining. Billed through [facility] will process a refill with $0.00 copay     Lab Results   Component Value Date    CHOL 175 2021    TRIG 143 2021    HDL 45 2021    LDLCALC 101 (H) 2021     ALT   Date Value Ref Range Status   2022 11 0 - 60 U/L Final     AST   Date Value Ref Range Status   2022 16 0 - 55 U/L Final     The 10-year ASCVD risk score (Pati WARNER, et al., 2019) is: 20.2%    Values used to calculate the score:      Age: 65 years      Sex: Female      Is Non- : No      Diabetic: No      Tobacco smoker: No      Systolic Blood Pressure: 600 mmHg      Is BP treated: No      HDL Cholesterol: 45 mg/dL      Total Cholesterol: 175 mg/dL     PLAN  No patient out reach planned at this time.      Pt appears adherent at this time     Will sign off     Future Appointments   Date Time Provider Tova Shaw   2023  3:00 PM Willard Brown FPS AWV LPN Willard Brown [facility]    11:15 AM Brooklynn Medrano MD [facility]PHHT[facility] AFL [facility]   3/13/2023  8:15 AM Patricio Fisher DO [facility]   4/10/2023 10:00 AM Lulu Cristobal MD [facility] [address]   [address] [place] [address]  // Department, toll free [phone], Option 2    For Pharmacy Admin Tracking Only    Program: [address] in place:  No  Recommendation Provided To: Pharmacy: 1  Intervention Detail: Adherence Monitorin  Intervention Accepted By: Pharmacy: 1  Gap Closed?: No   Time Spent (min): 10

## 2022-12-29 NOTE — LETTER
[facility]  [address], [address]        [place] [address]           12/29/22     Dear Hanna Leo,    We have on file that you are currently taking atorvastatin 40mg daily. If you are no longer taking or taking differently, please call us at the number below so that we can discuss this and update your medication profile. This medication is currently filled and ready for you at [address] for $0 copay. Please pick this medication up as soon as possible, if you have not already done so. It appears that this medication has not been filled at proper times. We are worried you might be missing doses or not taking it as directed. It is important that you take your medications regularly and try not to miss a single dose.     Some ways to help you remember to take and refill your medications are to:  · Use a pill box, set an alarm, and/or keep your medication near something that you do every day  · Ask your pharmacy if they participate in [facility]", a program where you can  all of your medications on the same day  · Ask your pharmacy if you can be set up with automatic refill, where they will automatically refill your prescription when it is due and let you know it's ready to     Sincerely,   Evelyn Bradley, PharmD, BCACP, [facility]  Department, toll free: [phone], option 1

## 2023-01-24 DIAGNOSIS — F32.5 MAJOR DEPRESSIVE DISORDER WITH SINGLE EPISODE, IN FULL REMISSION (HCC): Chronic | ICD-10-CM

## 2023-01-24 DIAGNOSIS — N18.32 STAGE 3B CHRONIC KIDNEY DISEASE (HCC): ICD-10-CM

## 2023-01-24 DIAGNOSIS — C18.7 CANCER OF SIGMOID COLON (HCC): ICD-10-CM

## 2023-01-24 DIAGNOSIS — E78.49 OTHER HYPERLIPIDEMIA: ICD-10-CM

## 2023-01-24 DIAGNOSIS — C50.912 INVASIVE DUCTAL CARCINOMA OF LEFT BREAST (HCC): ICD-10-CM

## 2023-01-24 DIAGNOSIS — I10 ESSENTIAL HYPERTENSION: ICD-10-CM

## 2023-01-24 DIAGNOSIS — E66.3 OVERWEIGHT: ICD-10-CM

## 2023-01-24 DIAGNOSIS — E87.5 HYPERKALEMIA: ICD-10-CM

## 2023-01-24 LAB
ALBUMIN SERPL-MCNC: 4.4 G/DL (ref 3.4–5)
ANION GAP SERPL CALCULATED.3IONS-SCNC: 17 MMOL/L (ref 3–16)
BUN BLDV-MCNC: 24 MG/DL (ref 7–20)
CALCIUM SERPL-MCNC: 10.5 MG/DL (ref 8.3–10.6)
CHLORIDE BLD-SCNC: 103 MMOL/L (ref 99–110)
CHOLESTEROL, TOTAL: 189 MG/DL (ref 0–199)
CO2: 24 MMOL/L (ref 21–32)
CREAT SERPL-MCNC: 1.5 MG/DL (ref 0.6–1.2)
GFR SERPL CREATININE-BSD FRML MDRD: 35 ML/MIN/{1.73_M2}
GLUCOSE BLD-MCNC: 139 MG/DL (ref 70–99)
HDLC SERPL-MCNC: 55 MG/DL (ref 40–60)
LDL CHOLESTEROL CALCULATED: 108 MG/DL
MAGNESIUM: 1.7 MG/DL (ref 1.8–2.4)
PHOSPHORUS: 3.7 MG/DL (ref 2.5–4.9)
POTASSIUM SERPL-SCNC: 5 MMOL/L (ref 3.5–5.1)
SODIUM BLD-SCNC: 144 MMOL/L (ref 136–145)
TRIGL SERPL-MCNC: 131 MG/DL (ref 0–150)
VLDLC SERPL CALC-MCNC: 26 MG/DL

## 2023-01-25 ENCOUNTER — TELEMEDICINE (OUTPATIENT)
Dept: FAMILY MEDICINE CLINIC | Age: 78
End: 2023-01-25
Payer: MEDICARE

## 2023-01-25 DIAGNOSIS — Z00.00 MEDICARE ANNUAL WELLNESS VISIT, SUBSEQUENT: Primary | ICD-10-CM

## 2023-01-25 PROCEDURE — G0439 PPPS, SUBSEQ VISIT: HCPCS | Performed by: STUDENT IN AN ORGANIZED HEALTH CARE EDUCATION/TRAINING PROGRAM

## 2023-01-25 PROCEDURE — 1124F ACP DISCUSS-NO DSCNMKR DOCD: CPT | Performed by: STUDENT IN AN ORGANIZED HEALTH CARE EDUCATION/TRAINING PROGRAM

## 2023-01-25 SDOH — ECONOMIC STABILITY: FOOD INSECURITY: WITHIN THE PAST 12 MONTHS, YOU WORRIED THAT YOUR FOOD WOULD RUN OUT BEFORE YOU GOT MONEY TO BUY MORE.: NEVER TRUE

## 2023-01-25 SDOH — ECONOMIC STABILITY: FOOD INSECURITY: WITHIN THE PAST 12 MONTHS, THE FOOD YOU BOUGHT JUST DIDN'T LAST AND YOU DIDN'T HAVE MONEY TO GET MORE.: NEVER TRUE

## 2023-01-25 ASSESSMENT — PATIENT HEALTH QUESTIONNAIRE - PHQ9
1. LITTLE INTEREST OR PLEASURE IN DOING THINGS: 0
8. MOVING OR SPEAKING SO SLOWLY THAT OTHER PEOPLE COULD HAVE NOTICED. OR THE OPPOSITE, BEING SO FIGETY OR RESTLESS THAT YOU HAVE BEEN MOVING AROUND A LOT MORE THAN USUAL: 0
SUM OF ALL RESPONSES TO PHQ QUESTIONS 1-9: 2
2. FEELING DOWN, DEPRESSED OR HOPELESS: 0
9. THOUGHTS THAT YOU WOULD BE BETTER OFF DEAD, OR OF HURTING YOURSELF: 0
SUM OF ALL RESPONSES TO PHQ QUESTIONS 1-9: 2
SUM OF ALL RESPONSES TO PHQ QUESTIONS 1-9: 2
5. POOR APPETITE OR OVEREATING: 0
SUM OF ALL RESPONSES TO PHQ9 QUESTIONS 1 & 2: 0
7. TROUBLE CONCENTRATING ON THINGS, SUCH AS READING THE NEWSPAPER OR WATCHING TELEVISION: 0
4. FEELING TIRED OR HAVING LITTLE ENERGY: 1
3. TROUBLE FALLING OR STAYING ASLEEP: 1
10. IF YOU CHECKED OFF ANY PROBLEMS, HOW DIFFICULT HAVE THESE PROBLEMS MADE IT FOR YOU TO DO YOUR WORK, TAKE CARE OF THINGS AT HOME, OR GET ALONG WITH OTHER PEOPLE: 0
SUM OF ALL RESPONSES TO PHQ QUESTIONS 1-9: 2
6. FEELING BAD ABOUT YOURSELF - OR THAT YOU ARE A FAILURE OR HAVE LET YOURSELF OR YOUR FAMILY DOWN: 0

## 2023-01-25 ASSESSMENT — LIFESTYLE VARIABLES
HOW MANY STANDARD DRINKS CONTAINING ALCOHOL DO YOU HAVE ON A TYPICAL DAY: PATIENT DOES NOT DRINK
HOW OFTEN DO YOU HAVE A DRINK CONTAINING ALCOHOL: NEVER

## 2023-01-25 ASSESSMENT — SOCIAL DETERMINANTS OF HEALTH (SDOH): HOW HARD IS IT FOR YOU TO PAY FOR THE VERY BASICS LIKE FOOD, HOUSING, MEDICAL CARE, AND HEATING?: NOT HARD AT ALL

## 2023-01-25 NOTE — PATIENT INSTRUCTIONS
Personalized Preventive Plan for Bear Harmon - 1/25/2023  Medicare offers a range of preventive health benefits. Some of the tests and screenings are paid in full while other may be subject to a deductible, co-insurance, and/or copay. Some of these benefits include a comprehensive review of your medical history including lifestyle, illnesses that may run in your family, and various assessments and screenings as appropriate. After reviewing your medical record and screening and assessments performed today your provider may have ordered immunizations, labs, imaging, and/or referrals for you. A list of these orders (if applicable) as well as your Preventive Care list are included within your After Visit Summary for your review. Other Preventive Recommendations:    A preventive eye exam performed by an eye specialist is recommended every 1-2 years to screen for glaucoma; cataracts, macular degeneration, and other eye disorders. A preventive dental visit is recommended every 6 months. Try to get at least 150 minutes of exercise per week or 10,000 steps per day on a pedometer . Order or download the FREE \"Exercise & Physical Activity: Your Everyday Guide\" from [employer]. Call 7-586.641.5652 or search [employer] online. You need 0233-3200 mg of calcium and 0172-6823 IU of vitamin D per day. It is possible to meet your calcium requirement with diet alone, but a vitamin D supplement is usually necessary to meet this goal.  When exposed to the sun, use a sunscreen that protects against both UVA and UVB radiation with an SPF of 30 or greater. Reapply every 2 to 3 hours or after sweating, drying off with a towel, or swimming. Always wear a seat belt when traveling in a car. Always wear a helmet when riding a bicycle or motorcycle.

## 2023-01-25 NOTE — PROGRESS NOTES
Medicare Annual Wellness Visit    Eddi Bledsoe is here for Medicare AWV    Assessment & Plan   Medicare annual wellness visit, subsequent      Recommendations for Preventive Services Due: see orders and patient instructions/AVS.  Recommended screening schedule for the next 5-10 years is provided to the patient in written form: see Patient Instructions/AVS.     No follow-ups on file. Subjective       Patient's complete Health Risk Assessment and screening values have been reviewed and are found in Flowsheets. The following problems were reviewed today and where indicated follow up appointments were made and/or referrals ordered. Positive Risk Factor Screenings with Interventions:               General HRA Questions:  Select all that apply: (!) Stress (25 yr old grandson lives with her)    Stress Interventions:  Patient comments: patient states she has a little stress since her 21year old grandson lives with her, who is bipolar.   Patient declined any further interventions or treatment       Weight and Activity:  Physical Activity: Inactive    Days of Exercise per Week: 0 days    Minutes of Exercise per Session: 0 min     On average, how many days per week do you engage in moderate to strenuous exercise (like a brisk walk)?: 0 days  Have you lost any weight without trying in the past 3 months?: No         Inactivity Interventions:  Patient declined any further interventions or treatment  Obesity Interventions:  Patient declines any further evaluation or treatment           Hearing Screen:  Do you or your family notice any trouble with your hearing that hasn't been managed with hearing aids?: (!) Yes (no referral)    Interventions:  Patient comments: patient states her hearing is not what it used to be, but declined referral.  Patient declines any further evaluation or treatment    Vision Screen:  Do you have difficulty driving, watching TV, or doing any of your daily activities because of your eyesight?: No  Have you had an eye exam within the past year?: (!) No  No results found. Interventions:   Patient comments: patient states she does need to make an eye appointment  Patient encouraged to make appointment with their eye specialist      Advanced Directives:  Do you have a Living Will?: (!) No    Intervention:  has NO advanced directive - information provided verbally                       Objective      Patient-Reported Vitals  No data recorded     Unable to obtain 3 vital signs due to patient not having equipment to take blood pressure/temperature. Allergies   Allergen Reactions    Neosporin [Neomycin-Polymyx-Gramicid] Swelling     Prior to Visit Medications    Medication Sig Taking?  Authorizing Provider   famotidine (PEPCID) 20 MG tablet TAKE 1 TABLET BY MOUTH TWICE DAILY Yes Luis Eduardo James DO   dilTIAZem (CARDIZEM CD) 120 MG extended release capsule TAKE 2 CAPSULES BY MOUTH DAILY Yes Nikunj Theodore MD   anastrozole (ARIMIDEX) 1 MG tablet Take 1 mg by mouth daily Yes Historical Provider, MD   atorvastatin (LIPITOR) 40 MG tablet Take 1 tablet by mouth nightly Yes Luis Eduardo James DO   loperamide (IMODIUM) 2 MG capsule Take 1 capsule by mouth 4 times daily as needed for Diarrhea Yes Luis Eduardo James DO   ondansetron (ZOFRAN) 8 MG tablet TK 1 T PO Q 8 H PRF NAUSEA OR VOM Yes Luis Eduardo James DO   ferrous sulfate (IRON 325) 325 (65 Fe) MG tablet Take 1 tablet by mouth 3 times daily (with meals) Yes Nikunj Theodore MD   docusate sodium (COLACE) 100 MG capsule Take 1 capsule by mouth daily Yes Nikunj Obrien MD   Blood Pressure Monitoring (BLOOD PRESSURE KIT) JOSE DAVID Use as directed Yes COLUMBA Harp   Acetaminophen (TYLENOL EX ST ARTHRITIS PAIN PO) Take by mouth daily as needed TAKE PRN Yes Historical Provider, MD   sertraline (ZOLOFT) 100 MG tablet Take 1.5 tablets by mouth every morning  Luis Eduardo James DO       CareTeam (Including outside providers/suppliers regularly involved in providing care):   Patient Care Team:  Felipa Ordonez DO as PCP - General (Family Medicine)  Felipa Ordonez DO as PCP - [facility]     Reviewed and updated this visit:  Allergies  Meds  Med Hx  Surg Hx  Soc Hx  Fam Hx        I, Zulay Dunn LPN, 8/99/9526, performed the documented evaluation under the direct supervision of the attending physician. Valentín Paul, was evaluated through a synchronous (real-time) audio encounter. The patient (or guardian if applicable) is aware that this is a billable service, which includes applicable co-pays. This Virtual Visit was conducted with patient's (and/or legal guardian's) consent. The visit was conducted pursuant to the emergency declaration under the [address] [place] [address], [address] [place] [address] waiver authority and the [employer] General Act. Patient identification was verified, and a caregiver was present when appropriate. The patient was located at Home: [address] [place] [address]. Provider was located at [facility] ([address] [place] [address]): [address] [place] [address]. [place] [address]. Total time spent for this encounter: Not billed by time    --Zulay Dunn LPN on 0/45/9935 at 03:35 AM    An electronic signature was used to authenticate this note. This encounter was performed under my, Denys Nuñez, direct supervision, 1/25/2023.

## 2023-03-13 ENCOUNTER — OFFICE VISIT (OUTPATIENT)
Dept: FAMILY MEDICINE CLINIC | Age: 78
End: 2023-03-13
Payer: MEDICARE

## 2023-03-13 VITALS
HEIGHT: 60 IN | OXYGEN SATURATION: 96 % | HEART RATE: 80 BPM | BODY MASS INDEX: 36.89 KG/M2 | WEIGHT: 187.9 LBS | SYSTOLIC BLOOD PRESSURE: 128 MMHG | DIASTOLIC BLOOD PRESSURE: 80 MMHG

## 2023-03-13 DIAGNOSIS — R73.01 IFG (IMPAIRED FASTING GLUCOSE): ICD-10-CM

## 2023-03-13 DIAGNOSIS — R09.82 POST-NASAL DRIP: ICD-10-CM

## 2023-03-13 DIAGNOSIS — K21.9 GASTROESOPHAGEAL REFLUX DISEASE, UNSPECIFIED WHETHER ESOPHAGITIS PRESENT: ICD-10-CM

## 2023-03-13 DIAGNOSIS — Z78.0 POST-MENOPAUSAL: ICD-10-CM

## 2023-03-13 DIAGNOSIS — E78.49 OTHER HYPERLIPIDEMIA: ICD-10-CM

## 2023-03-13 DIAGNOSIS — C50.912 INVASIVE DUCTAL CARCINOMA OF LEFT BREAST (HCC): ICD-10-CM

## 2023-03-13 DIAGNOSIS — I10 ESSENTIAL HYPERTENSION: ICD-10-CM

## 2023-03-13 DIAGNOSIS — Z85.038 HISTORY OF COLON CANCER: ICD-10-CM

## 2023-03-13 DIAGNOSIS — R91.1 LUNG NODULE: ICD-10-CM

## 2023-03-13 DIAGNOSIS — F32.5 MAJOR DEPRESSIVE DISORDER WITH SINGLE EPISODE, IN FULL REMISSION (HCC): Primary | ICD-10-CM

## 2023-03-13 PROCEDURE — 99214 OFFICE O/P EST MOD 30 MIN: CPT | Performed by: STUDENT IN AN ORGANIZED HEALTH CARE EDUCATION/TRAINING PROGRAM

## 2023-03-13 PROCEDURE — 3074F SYST BP LT 130 MM HG: CPT | Performed by: STUDENT IN AN ORGANIZED HEALTH CARE EDUCATION/TRAINING PROGRAM

## 2023-03-13 PROCEDURE — 1124F ACP DISCUSS-NO DSCNMKR DOCD: CPT | Performed by: STUDENT IN AN ORGANIZED HEALTH CARE EDUCATION/TRAINING PROGRAM

## 2023-03-13 PROCEDURE — 3078F DIAST BP <80 MM HG: CPT | Performed by: STUDENT IN AN ORGANIZED HEALTH CARE EDUCATION/TRAINING PROGRAM

## 2023-03-13 RX ORDER — SERTRALINE HYDROCHLORIDE 100 MG/1
150 TABLET, FILM COATED ORAL EVERY MORNING
Qty: 135 TABLET | Refills: 1 | Status: SHIPPED | OUTPATIENT
Start: 2023-03-13 | End: 2023-09-09

## 2023-03-13 RX ORDER — FAMOTIDINE 20 MG/1
TABLET, FILM COATED ORAL
Qty: 180 TABLET | Refills: 1 | Status: SHIPPED | OUTPATIENT
Start: 2023-03-13

## 2023-03-13 RX ORDER — DILTIAZEM HYDROCHLORIDE 120 MG/1
240 CAPSULE, COATED, EXTENDED RELEASE ORAL DAILY
Qty: 180 CAPSULE | Refills: 3 | Status: SHIPPED | OUTPATIENT
Start: 2023-03-13

## 2023-03-13 RX ORDER — ATORVASTATIN CALCIUM 40 MG/1
40 TABLET, FILM COATED ORAL NIGHTLY
Qty: 90 TABLET | Refills: 1 | Status: SHIPPED | OUTPATIENT
Start: 2023-03-13

## 2023-03-13 NOTE — PROGRESS NOTES
3/15/2023    Padma Guzmán    Chief Complaint   Patient presents with    6 Month Follow-Up    Ear Fullness     Right ear, started 2 weeks ago, went to urgent care and was given prednisone and ear drops which didn't help, did call ENT and has an appt tomorrow, also urgent care said her ear was red. Pharyngitis     Started after her ear problem, urgent care said her throat was red and did test for strep which was negative. HPI  History was obtained from patient. Lashay Mcdonald is a 65 y.o. female with a PMHx as listed below who presents today for   6 month follow up on chornic conditions. Currently having ear fullness starting 2 weeks ago on cipro drops Rx. By , plan to see ENT tomorrow. No fever, chills  Lots of discomfort in ears    Following with Dr. Black, oncology Hx colon ca s/p sigmoid resection, invasive ductal ca. Left breast, patient has declined radiation    Issues with depression, family member Dx. Hodgkins, son rosario. .     1. Major depressive disorder with single episode, in full remission ([address]    2. Other hyperlipidemia    3. Gastroesophageal reflux disease, unspecified whether esophagitis present    4. Invasive ductal carcinoma of left breast ([address]    5. Essential hypertension    6. IFG (impaired fasting glucose)    7. History of colon cancer    8. Lung nodule    9. Post-menopausal    10. Post-nasal drip             REVIEW OF SYMPTOMS    Review of Systems   Constitutional:  Negative for chills and fatigue. HENT:  Negative for congestion and sore throat. Respiratory:  Negative for shortness of breath and wheezing. Cardiovascular:  Negative for chest pain and palpitations. Gastrointestinal:  Negative for abdominal pain and nausea. Genitourinary:  Negative for frequency and urgency. Neurological:  Negative for light-headedness.      PAST MEDICAL HISTORY  Past Medical History:   Diagnosis Date    Abnormal EKG      see ekg report-11/4/2016-\"have appt 11/18/2016 to see Dr Terrie Lozano to get heart clearance for surgery - 11/21/2016\"    Anemia     Chemotherapy induced diarrhea     Chronic kidney disease, stage 3 (HCC)     Colon Cancer Dx 1-8-19    Colonoscopy, tx surg- following with Dr Black to start chemo    DVT (deep vein thrombosis) in pregnancy     Essential hypertension     History of blood transfusion 1960's    No Reaction To Blood Transfusion Received    Hx of blood clots     left leg    Hx of colonic polyp     Hyperlipidemia     Hypertension     Impaired fasting glucose     Malignant neoplasm of colon (HCC)     PONV (postoperative nausea and vomiting)     denies hx of motion sickness    Shortness of breath on exertion     Teeth missing     Upper And Lower    UTI (urinary tract infection) In Past    No Current Symptoms    Wears dentures     Full Upper, Partial Lower    Wears glasses     Wears partial dentures     Lower       FAMILY HISTORY  Family History   Problem Relation Age of Onset    Diabetes Mother     Brain Cancer Mother     Tuberculosis Father     Diabetes Sister     COPD Sister     Early Death Brother [address] [facility]        Lung Cancer    Lung Cancer Brother     Early Death Daughter 47        Lung Cancer    Cancer Daughter         Lung Cancer    Cancer Daughter         Breast Cancer    Early Death Daughter 52        Breast Cancer    Breast Cancer Neg Hx        SOCIAL HISTORY  Social History     Socioeconomic History    Marital status:     Number of children: 3   Tobacco Use    Smoking status: Never    Smokeless tobacco: Never   Vaping Use    Vaping Use: Never used   Substance and Sexual Activity    Alcohol use: No    Drug use: No    Sexual activity: Not Currently     Social Determinants of Health     Financial Resource Strain: Low Risk     Difficulty of Paying Living Expenses: Not hard at all   Food Insecurity: No Food Insecurity    Worried About Running Out of Food in the Last Year: Never true    Ran Out of Food in the Last Year: Never true   Physical Activity: Inactive    Days of Exercise per Week: 0 days    Minutes of Exercise per Session: 0 min        SURGICAL HISTORY  Past Surgical History:   Procedure Laterality Date    APPENDECTOMY  1960    BACK SURGERY  2012    ACF C4, C5 With Hardware    BLADDER SUSPENSION  2002    Done With Vaginal Hysterectomy    CHOLECYSTECTOMY, LAPAROSCOPIC  11/21/2016    COLONOSCOPY  07/17/2013    Polyps x2, Internal hemorrhoids    COLONOSCOPY  01/08/2019    Ulcerated mass in sigmoid at 15 cm, Internal grade 1 hemorrhoids, otherwise normal to 40cm    COLONOSCOPY  10/17/2019    grade 1 int hem, s/p sigmoid resection, repeat in 3 years    COLONOSCOPY N/A 10/17/2019    COLORECTAL CANCER SCREENING, HIGH RISK performed by Dave Mayer MD at [address] [facility] N/A 08/12/2020    COLONOSCOPY POLYPECTOMY SNARE/COLD BIOPSY performed by Dave Mayer MD at [address] Left 2016    Broken Left Wrist With Hardware    HYSTERECTOMY VAGINAL  2002    Bladder Suspension Also Done    INSERTION / REMOVAL / REPLACEMENT VENOUS ACCESS CATHETER Right 02/20/2019    PORT INSERTION performed by Rose Mary Hooper MD at [address] [facility]  2013    [address] N/A 01/08/2019    SIGMOIDOSCOPY BIOPSY FLEXIBLE with tattoo performed by Dave Mayer MD at [address] N/A 01/16/2019    BOWEL RESECTION SIGMOID performed by Rose Mary Hooper MD at [address] [facility] [address]  Late 1970's    TUNNELED VENOUS PORT PLACEMENT  02/20/2019    US BREAST BIOPSY W LOC DEVICE 1ST LESION LEFT Left 03/07/2022    US BREAST NEEDLE BIOPSY LEFT 3/7/2022 Juan Reynoso MD [facility]                 CURRENT MEDICATIONS  Current Outpatient Medications   Medication Sig Dispense Refill    atorvastatin (LIPITOR) 40 MG tablet Take 1 tablet by mouth nightly 90 tablet 1    dilTIAZem (CARDIZEM CD) 120 MG extended release capsule Take 2 capsules by mouth daily 180 capsule 3    famotidine (PEPCID) 20 MG tablet TAKE 1 TABLET BY MOUTH TWICE DAILY 180 tablet 1    sertraline (ZOLOFT) 100 MG tablet Take 1.5 tablets by mouth every morning 135 tablet 1    anastrozole (ARIMIDEX) 1 MG tablet Take 1 mg by mouth daily      loperamide (IMODIUM) 2 MG capsule Take 1 capsule by mouth 4 times daily as needed for Diarrhea 120 capsule 1    ondansetron (ZOFRAN) 8 MG tablet TK 1 T PO Q 8 H PRF NAUSEA OR VOM 30 tablet 1    ferrous sulfate (IRON 325) 325 (65 Fe) MG tablet Take 1 tablet by mouth 3 times daily (with meals) 90 tablet 3    docusate sodium (COLACE) 100 MG capsule Take 1 capsule by mouth daily 30 capsule 3    Blood Pressure Monitoring (BLOOD PRESSURE KIT) JOSE DAVID Use as directed 1 Device 0    Acetaminophen (TYLENOL EX ST ARTHRITIS PAIN PO) Take by mouth daily as needed TAKE PRN       No current facility-administered medications for this visit. ALLERGIES  Allergies   Allergen Reactions    Neosporin [Neomycin-Polymyx-Gramicid] Swelling       PHYSICAL EXAM    /80 (Site: Left Upper Arm, Position: Sitting, Cuff Size: Medium Adult)   Pulse 80   Ht 5' (1.524 m)   Wt 187 lb 14.4 oz (85.2 kg)   SpO2 96%   BMI 36.70 kg/m²     Physical Exam  Constitutional:       Appearance: Normal appearance. HENT:      Head: Normocephalic and atraumatic. Eyes:      Extraocular Movements: Extraocular movements intact. Pupils: Pupils are equal, round, and reactive to light. Cardiovascular:      Rate and Rhythm: Normal rate and regular rhythm. Pulses: Normal pulses. Heart sounds: No murmur heard. No friction rub. No gallop. Skin:     General: Skin is warm and dry. Neurological:      General: No focal deficit present. Mental Status: She is alert. Psychiatric:         Mood and Affect: Mood normal.         Behavior: Behavior normal.       B/l cerumen TM visualized b/l     ASSESSMENT & PLAN    1. Major depressive disorder with single episode, in full remission ([facility]    - sertraline (ZOLOFT) 100 MG tablet;  Take 1.5 tablets by mouth every morning  Dispense: 135 tablet; Refill: 1    2. Other hyperlipidemia    - atorvastatin (LIPITOR) 40 MG tablet; Take 1 tablet by mouth nightly  Dispense: 90 tablet; Refill: 1    3. Gastroesophageal reflux disease, unspecified whether esophagitis present    - famotidine (PEPCID) 20 MG tablet; TAKE 1 TABLET BY MOUTH TWICE DAILY  Dispense: 180 tablet; Refill: 1    4. Invasive ductal carcinoma of left breast ([address]      5. Essential hypertension    - dilTIAZem (CARDIZEM CD) 120 MG extended release capsule; Take 2 capsules by mouth daily  Dispense: 180 capsule; Refill: 3    6. IFG (impaired fasting glucose)      7. History of colon cancer      8. Lung nodule      9. Post-menopausal    - DEXA BONE DENSITY AXIAL SKELETON; Future    10. Post-nasal drip      Counseled on vaccinations  Start mucinex post nasal drip  Mood stable  Continue to follow with Alexandra Valdez Dr. See they are monitoring lung nodule, stage I breast ca patient has declined radiation  GERD stable  BP well controlled    Return in about 6 months (around 9/13/2023).          Electronically signed by Becky Mackey DO on 3/15/2023

## 2023-03-15 ASSESSMENT — ENCOUNTER SYMPTOMS
NAUSEA: 0
SORE THROAT: 0
WHEEZING: 0
SHORTNESS OF BREATH: 0
ABDOMINAL PAIN: 0

## 2023-04-21 LAB — MAMMOGRAPHY, EXTERNAL: NORMAL

## 2023-05-18 ENCOUNTER — TRANSCRIBE ORDERS (OUTPATIENT)
Dept: ADMINISTRATIVE | Age: 78
End: 2023-05-18

## 2023-05-18 DIAGNOSIS — Z85.038 PERSONAL HISTORY OF COLON CANCER: Primary | ICD-10-CM

## 2023-05-26 ENCOUNTER — HOSPITAL ENCOUNTER (OUTPATIENT)
Dept: CT IMAGING | Age: 78
Discharge: HOME | End: 2023-05-26
Payer: MEDICARE

## 2023-05-26 DIAGNOSIS — Z85.038 PERSONAL HISTORY OF COLON CANCER: ICD-10-CM

## 2023-05-26 PROCEDURE — 71250 CT THORAX DX C-: CPT

## 2023-05-26 PROCEDURE — 74176 CT ABD & PELVIS W/O CONTRAST: CPT

## 2023-07-19 DIAGNOSIS — E78.49 OTHER HYPERLIPIDEMIA: ICD-10-CM

## 2023-07-19 DIAGNOSIS — K21.9 GASTROESOPHAGEAL REFLUX DISEASE, UNSPECIFIED WHETHER ESOPHAGITIS PRESENT: ICD-10-CM

## 2023-07-20 RX ORDER — ATORVASTATIN CALCIUM 40 MG/1
TABLET, FILM COATED ORAL
Qty: 100 TABLET | Refills: 2 | Status: SHIPPED | OUTPATIENT
Start: 2023-07-20

## 2023-07-20 RX ORDER — FAMOTIDINE 20 MG/1
TABLET, FILM COATED ORAL
Qty: 200 TABLET | Refills: 2 | Status: SHIPPED | OUTPATIENT
Start: 2023-07-20

## 2023-08-23 ENCOUNTER — OFFICE VISIT (OUTPATIENT)
Dept: FAMILY MEDICINE CLINIC | Age: 78
End: 2023-08-23
Payer: MEDICARE

## 2023-08-23 VITALS
DIASTOLIC BLOOD PRESSURE: 84 MMHG | OXYGEN SATURATION: 97 % | SYSTOLIC BLOOD PRESSURE: 122 MMHG | WEIGHT: 187 LBS | BODY MASS INDEX: 31.92 KG/M2 | HEART RATE: 70 BPM | HEIGHT: 64 IN

## 2023-08-23 DIAGNOSIS — R53.83 FATIGUE, UNSPECIFIED TYPE: ICD-10-CM

## 2023-08-23 DIAGNOSIS — R00.2 PALPITATIONS: ICD-10-CM

## 2023-08-23 DIAGNOSIS — R42 LIGHTHEADED: ICD-10-CM

## 2023-08-23 DIAGNOSIS — R00.2 PALPITATIONS: Primary | ICD-10-CM

## 2023-08-23 LAB
ALBUMIN SERPL-MCNC: 4.3 G/DL (ref 3.4–5)
ALBUMIN/GLOB SERPL: 1.6 {RATIO} (ref 1.1–2.2)
ALP SERPL-CCNC: 103 U/L (ref 40–129)
ALT SERPL-CCNC: 9 U/L (ref 10–40)
ANION GAP SERPL CALCULATED.3IONS-SCNC: 15 MMOL/L (ref 3–16)
ANISOCYTOSIS BLD QL SMEAR: ABNORMAL
AST SERPL-CCNC: 12 U/L (ref 15–37)
BASOPHILS # BLD: 0 K/UL (ref 0–0.2)
BASOPHILS NFR BLD: 0.6 %
BILIRUB SERPL-MCNC: 0.3 MG/DL (ref 0–1)
BUN SERPL-MCNC: 28 MG/DL (ref 7–20)
CALCIUM SERPL-MCNC: 10.4 MG/DL (ref 8.3–10.6)
CHLORIDE SERPL-SCNC: 103 MMOL/L (ref 99–110)
CO2 SERPL-SCNC: 24 MMOL/L (ref 21–32)
CREAT SERPL-MCNC: 1.6 MG/DL (ref 0.6–1.2)
DACRYOCYTES BLD QL SMEAR: ABNORMAL
DEPRECATED RDW RBC AUTO: 17.9 % (ref 12.4–15.4)
EOSINOPHIL # BLD: 0.1 K/UL (ref 0–0.6)
EOSINOPHIL NFR BLD: 1.5 %
GFR SERPLBLD CREATININE-BSD FMLA CKD-EPI: 33 ML/MIN/{1.73_M2}
GLUCOSE SERPL-MCNC: 161 MG/DL (ref 70–99)
HCT VFR BLD AUTO: 28.6 % (ref 36–48)
HGB BLD-MCNC: 8.6 G/DL (ref 12–16)
LYMPHOCYTES # BLD: 0.7 K/UL (ref 1–5.1)
LYMPHOCYTES NFR BLD: 12.4 %
MCH RBC QN AUTO: 19.6 PG (ref 26–34)
MCHC RBC AUTO-ENTMCNC: 29.9 G/DL (ref 31–36)
MCV RBC AUTO: 65.4 FL (ref 80–100)
MICROCYTES BLD QL SMEAR: ABNORMAL
MONOCYTES # BLD: 0.4 K/UL (ref 0–1.3)
MONOCYTES NFR BLD: 6.6 %
NEUTROPHILS # BLD: 4.5 K/UL (ref 1.7–7.7)
NEUTROPHILS NFR BLD: 78.9 %
OVALOCYTES BLD QL SMEAR: ABNORMAL
PATH INTERP BLD-IMP: YES
PLATELET # BLD AUTO: 298 K/UL (ref 135–450)
PLATELET BLD QL SMEAR: ADEQUATE
PMV BLD AUTO: 8.6 FL (ref 5–10.5)
POIKILOCYTOSIS BLD QL SMEAR: ABNORMAL
POLYCHROMASIA BLD QL SMEAR: ABNORMAL
POTASSIUM SERPL-SCNC: 4.1 MMOL/L (ref 3.5–5.1)
PROT SERPL-MCNC: 7 G/DL (ref 6.4–8.2)
RBC # BLD AUTO: 4.37 M/UL (ref 4–5.2)
SLIDE REVIEW: ABNORMAL
SODIUM SERPL-SCNC: 142 MMOL/L (ref 136–145)
T4 FREE SERPL-MCNC: 1.2 NG/DL (ref 0.9–1.8)
TSH SERPL DL<=0.005 MIU/L-ACNC: 2.58 UIU/ML (ref 0.27–4.2)
WBC # BLD AUTO: 5.7 K/UL (ref 4–11)

## 2023-08-23 PROCEDURE — 93000 ELECTROCARDIOGRAM COMPLETE: CPT | Performed by: PHYSICIAN ASSISTANT

## 2023-08-23 PROCEDURE — 99214 OFFICE O/P EST MOD 30 MIN: CPT | Performed by: PHYSICIAN ASSISTANT

## 2023-08-23 PROCEDURE — 3074F SYST BP LT 130 MM HG: CPT | Performed by: PHYSICIAN ASSISTANT

## 2023-08-23 PROCEDURE — 1124F ACP DISCUSS-NO DSCNMKR DOCD: CPT | Performed by: PHYSICIAN ASSISTANT

## 2023-08-23 PROCEDURE — 3079F DIAST BP 80-89 MM HG: CPT | Performed by: PHYSICIAN ASSISTANT

## 2023-08-23 NOTE — PROGRESS NOTES
8/23/2023    Shira Tashia    Chief Complaint   Patient presents with    Fatigue     - abnormal fatigue, denies sob. 1 month  - pt describes heart palpitations while doing anything strenuous such as gardening or carrying a laundry basket. 1 month       HPI  History was obtained from pt. Parker Desir is a 65 y.o. female with a PMHx as listed below who presents today for evlauation of fatigue and heart palpitation    Pt has been very tired and fatigued, and with activity she has heart palpitations and she has to sit down. This occurs with walking around the stoor, ascending steps, taking out trash. It is accompanied by mild lightheadedness, denies chest pains, sweating or nausea, no edema. No heart disease history. Sleep isnt great, has trouble falling asleep and gets up to pee, but tries to sleep in, might get 6-7 hours total    Lives with 21year old bipolar grandson who is off his medicine. 1. Palpitations    2. Fatigue, unspecified type    3.  Lightheaded             REVIEW OF SYMPTOMS    Review of Systems    PAST MEDICAL HISTORY  Past Medical History:   Diagnosis Date    Abnormal EKG      see ekg report-11/4/2016-\"have appt 11/18/2016 to see Dr Shayla Archuleta to get heart clearance for surgery - 11/21/2016\"    Anemia     Chemotherapy induced diarrhea     Chronic kidney disease, stage 3 (HCC)     Colon Cancer Dx 1-8-19    Colonoscopy, tx surg- following with Dr Black to start chemo    DVT (deep vein thrombosis) in pregnancy     Essential hypertension     History of blood transfusion 1960's    No Reaction To Blood Transfusion Received    Hx of blood clots     left leg    Hx of colonic polyp     Hyperlipidemia     Hypertension     Impaired fasting glucose     Malignant neoplasm of colon (HCC)     PONV (postoperative nausea and vomiting)     denies hx of motion sickness    Shortness of breath on exertion     Teeth missing     Upper And Lower    UTI (urinary tract infection) In Past    No Current Symptoms    Wears dentures

## 2023-08-24 LAB — PATH INTERP BLD-IMP: NORMAL

## 2023-08-25 DIAGNOSIS — R79.9 ABNORMAL BLOOD SMEAR: ICD-10-CM

## 2023-08-25 DIAGNOSIS — D50.9 MICROCYTIC ANEMIA: ICD-10-CM

## 2023-08-25 DIAGNOSIS — D64.9 ANEMIA, UNSPECIFIED TYPE: Primary | ICD-10-CM

## 2023-09-06 ENCOUNTER — INITIAL CONSULT (OUTPATIENT)
Dept: CARDIOLOGY CLINIC | Age: 78
End: 2023-09-06
Payer: MEDICARE

## 2023-09-06 ENCOUNTER — NURSE ONLY (OUTPATIENT)
Dept: CARDIOLOGY CLINIC | Age: 78
End: 2023-09-06

## 2023-09-06 VITALS
WEIGHT: 189 LBS | DIASTOLIC BLOOD PRESSURE: 64 MMHG | HEIGHT: 60 IN | SYSTOLIC BLOOD PRESSURE: 126 MMHG | BODY MASS INDEX: 37.11 KG/M2

## 2023-09-06 DIAGNOSIS — R60.0 EDEMA OF LOWER EXTREMITY: ICD-10-CM

## 2023-09-06 DIAGNOSIS — R00.2 PALPITATION: Primary | ICD-10-CM

## 2023-09-06 DIAGNOSIS — I10 ESSENTIAL HYPERTENSION: ICD-10-CM

## 2023-09-06 DIAGNOSIS — R06.02 SHORTNESS OF BREATH: ICD-10-CM

## 2023-09-06 DIAGNOSIS — E78.5 DYSLIPIDEMIA: ICD-10-CM

## 2023-09-06 PROCEDURE — 1124F ACP DISCUSS-NO DSCNMKR DOCD: CPT | Performed by: INTERNAL MEDICINE

## 2023-09-06 PROCEDURE — 3078F DIAST BP <80 MM HG: CPT | Performed by: INTERNAL MEDICINE

## 2023-09-06 PROCEDURE — 3074F SYST BP LT 130 MM HG: CPT | Performed by: INTERNAL MEDICINE

## 2023-09-06 PROCEDURE — 99204 OFFICE O/P NEW MOD 45 MIN: CPT | Performed by: INTERNAL MEDICINE

## 2023-09-06 NOTE — PROGRESS NOTES
7 days e-cardio monitor placed.  # [id number]. Instructed patient on how to record the event and to call monitoring center at [phone] if any problems arise. Instructed patient to disconnect the lead wires from the electrodes before bathing or showering and reattach them afterwards. Instructed patient that the electrodes should be changed every 3 days or if they no longer adhere to the skin. Patient to mail package after the monitor has ended. Patient verbalized understanding.

## 2023-09-06 NOTE — PATIENT INSTRUCTIONS
We are committed to providing you the best care possible. If you receive a survey after visiting one of our offices, please take time to share your experience concerning your physician office visit. These surveys are confidential and no health information about you is shared. We are eager to improve for you and we are counting on your feedback to help make that happen. Thank you for allowing us to care for you today! We want to ensure we can follow your treatment plan and we strive to give you the best outcomes and experience possible. If you ever have a life threatening emergency and call 911 - for an ambulance (EMS)   Our providers can only care for you at:   [facility] or [facility]. Even if you have someone take you or you drive yourself we can only care for you in a [address] [place] [address] facility. Our providers are not setup at the other healthcare locations! **It is YOUR responsibilty to bring medication bottles and/or updated medication list to [address] [place] [address]. This will allow us to better serve you and all your healthcare needs**  Please be informed that if you contact our office outside of normal business hours the physician on call cannot help with any scheduling or rescheduling issues, procedure instruction questions or any type of medication issue. We advise you for any urgent/emergency that you go to the nearest emergency room!     PLEASE CALL OUR OFFICE DURING NORMAL BUSINESS HOURS    Monday - Friday   8 am to 5 pm    [place]: [address]: [phone]    [place]:  [phone]

## 2023-09-06 NOTE — PROGRESS NOTES
Elizabeth Angel MD                                  CARDIOLOGY  NOTE        Referring Physician: COLUMBA Louise    Thank you for consultation. Chief Complaint:    Chief Complaint   Patient presents with    New Patient     Palpitations racing started 2 months ago last minutes doing strenuous things but aggravate it she will get SOB when it is racing . LT leg edema swelling more so in the RT HX of DVT 4 1/2 yrs ago    Edema    Palpitations        HPI:     Angie Jacobs is a 65y.o. year old female with prior medical history significant for essential hypertension, CKD, hyperlipidemia, obesity presents to the clinic with chief complaint of palpitation. 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 Patient mentions that with little exertion as well as stress, patient experiences palpitations and shortness of breath. She denies any chest pain. No prior established history of CAD CHF or arrhythmia  Follows up with Dr. Maribel Espinoza for essential hypertension and CKD and has been maintained on diltiazem. Patient also complains of occasional edema lower extremities. EKG in the office today shows normal sinus rhythm      Current Outpatient Medications   Medication Sig Dispense Refill    atorvastatin (LIPITOR) 40 MG tablet TAKE 1 TABLET BY MOUTH EVERY  NIGHT 100 tablet 2    famotidine (PEPCID) 20 MG tablet TAKE 1 TABLET BY MOUTH TWICE  DAILY 200 tablet 2    dilTIAZem (CARDIZEM CD) 120 MG extended release capsule Take 2 capsules by mouth daily 180 capsule 3    sertraline (ZOLOFT) 100 MG tablet Take 1.5 tablets by mouth every morning 135 tablet 1    anastrozole (ARIMIDEX) 1 MG tablet Take 1 tablet by mouth daily      loperamide (IMODIUM) 2 MG capsule Take 1 capsule by mouth 4 times daily as needed for Diarrhea 120 capsule 1    ondansetron (ZOFRAN) 8 MG tablet TK 1 T PO Q 8 H PRF NAUSEA OR VOM 30 tablet 1     No current facility-administered medications for this visit.        Allergies:     Neosporin

## 2023-09-14 ENCOUNTER — OFFICE VISIT (OUTPATIENT)
Dept: FAMILY MEDICINE CLINIC | Age: 78
End: 2023-09-14
Payer: MEDICARE

## 2023-09-14 VITALS
OXYGEN SATURATION: 98 % | SYSTOLIC BLOOD PRESSURE: 124 MMHG | HEART RATE: 73 BPM | BODY MASS INDEX: 32.2 KG/M2 | HEIGHT: 64 IN | DIASTOLIC BLOOD PRESSURE: 70 MMHG | WEIGHT: 188.6 LBS

## 2023-09-14 DIAGNOSIS — Z23 ENCOUNTER FOR IMMUNIZATION: Primary | ICD-10-CM

## 2023-09-14 DIAGNOSIS — I10 ESSENTIAL HYPERTENSION: ICD-10-CM

## 2023-09-14 DIAGNOSIS — E78.2 MIXED HYPERLIPIDEMIA: ICD-10-CM

## 2023-09-14 DIAGNOSIS — F32.5 MAJOR DEPRESSIVE DISORDER WITH SINGLE EPISODE, IN FULL REMISSION (HCC): ICD-10-CM

## 2023-09-14 DIAGNOSIS — D50.8 OTHER IRON DEFICIENCY ANEMIA: ICD-10-CM

## 2023-09-14 PROCEDURE — 3074F SYST BP LT 130 MM HG: CPT | Performed by: STUDENT IN AN ORGANIZED HEALTH CARE EDUCATION/TRAINING PROGRAM

## 2023-09-14 PROCEDURE — 1124F ACP DISCUSS-NO DSCNMKR DOCD: CPT | Performed by: STUDENT IN AN ORGANIZED HEALTH CARE EDUCATION/TRAINING PROGRAM

## 2023-09-14 PROCEDURE — 3078F DIAST BP <80 MM HG: CPT | Performed by: STUDENT IN AN ORGANIZED HEALTH CARE EDUCATION/TRAINING PROGRAM

## 2023-09-14 PROCEDURE — 90694 VACC AIIV4 NO PRSRV 0.5ML IM: CPT | Performed by: STUDENT IN AN ORGANIZED HEALTH CARE EDUCATION/TRAINING PROGRAM

## 2023-09-14 PROCEDURE — 99214 OFFICE O/P EST MOD 30 MIN: CPT | Performed by: STUDENT IN AN ORGANIZED HEALTH CARE EDUCATION/TRAINING PROGRAM

## 2023-09-14 PROCEDURE — G0008 ADMIN INFLUENZA VIRUS VAC: HCPCS | Performed by: STUDENT IN AN ORGANIZED HEALTH CARE EDUCATION/TRAINING PROGRAM

## 2023-09-14 RX ORDER — SERTRALINE HYDROCHLORIDE 100 MG/1
150 TABLET, FILM COATED ORAL EVERY MORNING
Qty: 135 TABLET | Refills: 1 | Status: SHIPPED | OUTPATIENT
Start: 2023-09-14 | End: 2024-03-12

## 2023-09-14 RX ORDER — FERROUS GLUCONATE 324(37.5)
324 TABLET ORAL DAILY
Qty: 90 TABLET | Refills: 0 | Status: SHIPPED | OUTPATIENT
Start: 2023-09-14 | End: 2023-12-13

## 2023-09-14 ASSESSMENT — ENCOUNTER SYMPTOMS
ABDOMINAL PAIN: 0
WHEEZING: 0
SORE THROAT: 0
SHORTNESS OF BREATH: 0
NAUSEA: 0

## 2023-09-14 NOTE — PROGRESS NOTES
9/19/2023    [facility]    Chief Complaint   Patient presents with    6 Month Follow-Up     Depression        HPI  History was obtained from patient. Flori Jolly is a 65 y.o. female with a PMHx as listed below who presents today for 6 month follow up. Evaluated by Felipe Mireles, found to have anemia    Plan for stress test, echo later next month  She continues to have palpitations with exertion. A year ago patient was taking daily iron 3x daily roughly one year ago   1. Encounter for immunization    2. Major depressive disorder with single episode, in full remission ([address])    3. Other iron deficiency anemia    4. Mixed hyperlipidemia    5. Essential hypertension             REVIEW OF SYMPTOMS    Review of Systems   Constitutional:  Negative for chills and fatigue. HENT:  Negative for congestion and sore throat. Respiratory:  Negative for shortness of breath and wheezing. Cardiovascular:  Negative for chest pain and palpitations. Gastrointestinal:  Negative for abdominal pain and nausea. Genitourinary:  Negative for frequency and urgency. Neurological:  Negative for light-headedness.        PAST MEDICAL HISTORY  Past Medical History:   Diagnosis Date    Abnormal EKG      see ekg report-11/4/2016-\"have appt 11/18/2016 to see Dr Dannielle Lombard to get heart clearance for surgery - 11/21/2016\"    Anemia     Chemotherapy induced diarrhea     Chronic kidney disease, stage 3 (HCC)     Colon Cancer Dx 1-8-19    Colonoscopy, tx surg- following with Dr Black to start chemo    DVT (deep vein thrombosis) in pregnancy     Essential hypertension     History of blood transfusion 1960's    No Reaction To Blood Transfusion Received    Hx of blood clots     left leg    Hx of colonic polyp     Hyperlipidemia     Hypertension     Impaired fasting glucose     Malignant neoplasm of colon (HCC)     PONV (postoperative nausea and vomiting)     denies hx of motion sickness    Shortness of breath on exertion     Teeth missing     Upper And

## 2023-09-15 ENCOUNTER — HOSPITAL ENCOUNTER (OUTPATIENT)
Dept: INFUSION THERAPY | Age: 78
Discharge: HOME | End: 2023-09-15
Payer: MEDICARE

## 2023-09-15 ENCOUNTER — INITIAL CONSULT (OUTPATIENT)
Dept: ONCOLOGY | Age: 78
End: 2023-09-15
Payer: MEDICARE

## 2023-09-15 VITALS
SYSTOLIC BLOOD PRESSURE: 132 MMHG | BODY MASS INDEX: 34.41 KG/M2 | DIASTOLIC BLOOD PRESSURE: 61 MMHG | WEIGHT: 187 LBS | OXYGEN SATURATION: 96 % | TEMPERATURE: 97.9 F | RESPIRATION RATE: 18 BRPM | HEART RATE: 85 BPM | HEIGHT: 62 IN

## 2023-09-15 DIAGNOSIS — C50.912 INVASIVE DUCTAL CARCINOMA OF LEFT BREAST (HCC): Primary | ICD-10-CM

## 2023-09-15 DIAGNOSIS — E61.1 IRON DEFICIENCY: Chronic | ICD-10-CM

## 2023-09-15 DIAGNOSIS — C18.7 CANCER OF SIGMOID COLON (HCC): ICD-10-CM

## 2023-09-15 LAB
FERRITIN: 19 NG/ML (ref 15–150)
IRON: 29 UG/DL (ref 37–145)
LACTATE DEHYDROGENASE: 184 IU/L (ref 120–246)
PCT TRANSFERRIN: 8 % (ref 10–44)
RETICULOCYTE COUNT PCT: 1.7 % (ref 0.2–2.2)
TOTAL IRON BINDING CAPACITY: 357 UG/DL (ref 250–450)
UNSATURATED IRON BINDING CAPACITY: 328 UG/DL (ref 110–370)
VITAMIN B-12: 385.1 PG/ML (ref 211–911)

## 2023-09-15 PROCEDURE — 85045 AUTOMATED RETICULOCYTE COUNT: CPT

## 2023-09-15 PROCEDURE — 83550 IRON BINDING TEST: CPT

## 2023-09-15 PROCEDURE — 83010 ASSAY OF HAPTOGLOBIN QUANT: CPT

## 2023-09-15 PROCEDURE — 99211 OFF/OP EST MAY X REQ PHY/QHP: CPT

## 2023-09-15 PROCEDURE — 36591 DRAW BLOOD OFF VENOUS DEVICE: CPT

## 2023-09-15 PROCEDURE — 3078F DIAST BP <80 MM HG: CPT | Performed by: INTERNAL MEDICINE

## 2023-09-15 PROCEDURE — 6360000002 HC RX W HCPCS: Performed by: INTERNAL MEDICINE

## 2023-09-15 PROCEDURE — 99204 OFFICE O/P NEW MOD 45 MIN: CPT | Performed by: INTERNAL MEDICINE

## 2023-09-15 PROCEDURE — 82607 VITAMIN B-12: CPT

## 2023-09-15 PROCEDURE — 2580000003 HC RX 258: Performed by: INTERNAL MEDICINE

## 2023-09-15 PROCEDURE — 82746 ASSAY OF FOLIC ACID SERUM: CPT

## 2023-09-15 PROCEDURE — 83540 ASSAY OF IRON: CPT

## 2023-09-15 PROCEDURE — 3075F SYST BP GE 130 - 139MM HG: CPT | Performed by: INTERNAL MEDICINE

## 2023-09-15 PROCEDURE — 83615 LACTATE (LD) (LDH) ENZYME: CPT

## 2023-09-15 PROCEDURE — 1124F ACP DISCUSS-NO DSCNMKR DOCD: CPT | Performed by: INTERNAL MEDICINE

## 2023-09-15 PROCEDURE — 82728 ASSAY OF FERRITIN: CPT

## 2023-09-15 RX ORDER — SODIUM CHLORIDE 9 MG/ML
25 INJECTION, SOLUTION INTRAVENOUS PRN
Status: CANCELLED | OUTPATIENT
Start: 2023-09-15

## 2023-09-15 RX ORDER — SODIUM CHLORIDE 9 MG/ML
25 INJECTION, SOLUTION INTRAVENOUS PRN
OUTPATIENT
Start: 2023-09-15

## 2023-09-15 RX ORDER — SODIUM CHLORIDE 0.9 % (FLUSH) 0.9 %
5-40 SYRINGE (ML) INJECTION PRN
Status: DISCONTINUED | OUTPATIENT
Start: 2023-09-15 | End: 2023-09-16 | Stop reason: HOSPADM

## 2023-09-15 RX ORDER — HEPARIN 100 UNIT/ML
500 SYRINGE INTRAVENOUS PRN
OUTPATIENT
Start: 2023-09-15

## 2023-09-15 RX ORDER — HEPARIN 100 UNIT/ML
500 SYRINGE INTRAVENOUS PRN
Status: DISCONTINUED | OUTPATIENT
Start: 2023-09-15 | End: 2023-09-16 | Stop reason: HOSPADM

## 2023-09-15 RX ORDER — SODIUM CHLORIDE 0.9 % (FLUSH) 0.9 %
5-40 SYRINGE (ML) INJECTION PRN
OUTPATIENT
Start: 2023-09-15

## 2023-09-15 RX ADMIN — HEPARIN 500 UNITS: 100 SYRINGE at 09:43

## 2023-09-15 RX ADMIN — SODIUM CHLORIDE, PRESERVATIVE FREE 20 ML: 5 INJECTION INTRAVENOUS at 09:44

## 2023-09-15 ASSESSMENT — PATIENT HEALTH QUESTIONNAIRE - PHQ9
1. LITTLE INTEREST OR PLEASURE IN DOING THINGS: 0
SUM OF ALL RESPONSES TO PHQ QUESTIONS 1-9: 0
SUM OF ALL RESPONSES TO PHQ QUESTIONS 1-9: 0
2. FEELING DOWN, DEPRESSED OR HOPELESS: 0
SUM OF ALL RESPONSES TO PHQ QUESTIONS 1-9: 0
SUM OF ALL RESPONSES TO PHQ QUESTIONS 1-9: 0
SUM OF ALL RESPONSES TO PHQ9 QUESTIONS 1 & 2: 0

## 2023-09-15 NOTE — PROGRESS NOTES
Ambulated to infusion area after office visit. Right mediport accessed, [facility] blood return, labs drawn. Mediport flushed per protocol then [facility]- accessed. Discharged in stable condition. Patient sent to checkout to schedule future appts.

## 2023-09-16 NOTE — PROGRESS NOTES
MA Rooming Questions  Patient: Mena Colindres  MRN: [id number]    Date: 9/15/2023        1. New patient      5. Did the patient have a depression screening completed today?  Yes    No data recorded     PHQ-9 Given to (if applicable):               PHQ-9 Score (if applicable):                     [] Positive     []  Negative              Does question #9 need addressed (if applicable)                     [] Yes    []  No               Anoop Mcclure MA
SURGERY N/A 01/16/2019    BOWEL RESECTION SIGMOID performed by Hayder Benedict MD at [address] [facility] [address]  Late 1970's    TUNNELED VENOUS PORT PLACEMENT  02/20/2019    US BREAST BIOPSY W LOC DEVICE 1ST LESION LEFT Left 03/07/2022    US BREAST NEEDLE BIOPSY LEFT 3/7/2022 Cristal Nogueira MD [facility]       Social History:   Social History     Socioeconomic History    Marital status:      Spouse name: None    Number of children: 3    Years of education: None    Highest education level: None   Tobacco Use    Smoking status: Never    Smokeless tobacco: Never   Vaping Use    Vaping Use: Never used   Substance and Sexual Activity    Alcohol use: No    Drug use: No    Sexual activity: Not Currently     Social Determinants of Health     Financial Resource Strain: Low Risk  (1/25/2023)    Overall Financial Resource Strain (CARDIA)     Difficulty of Paying Living Expenses: Not hard at all   Food Insecurity: No Food Insecurity (1/25/2023)    Hunger Vital Sign     Worried About Running Out of Food in the Last Year: Never true     Ran Out of Food in the Last Year: Never true   Physical Activity: Inactive (1/25/2023)    Exercise Vital Sign     Days of Exercise per Week: 0 days     Minutes of Exercise per Session: 0 min       Family History:    Family History   Problem Relation Age of Onset    Diabetes Mother     Brain Cancer Mother     Tuberculosis Father     Diabetes Sister     COPD Sister     Early Death Brother 61        Lung Cancer    Lung Cancer Brother     Early Death Daughter 47        Lung Cancer    Cancer Daughter         Lung Cancer    Cancer Daughter         Breast Cancer    Early Death Daughter 52        Breast Cancer    Breast Cancer Neg Hx         Allergies   Allergen Reactions    Neosporin [Neomycin-Polymyx-Gramicid] Swelling       Current Outpatient Medications on File Prior to Visit   Medication Sig Dispense Refill    ferrous gluconate 324 (37.5 Fe) MG TABS Take 1 tablet by mouth Daily 90

## 2023-09-17 LAB — HAPTOGLOB SERPL-MCNC: 167 MG/DL (ref 30–200)

## 2023-09-18 ENCOUNTER — CLINICAL DOCUMENTATION (OUTPATIENT)
Dept: ONCOLOGY | Age: 78
End: 2023-09-18

## 2023-09-18 ENCOUNTER — TELEPHONE (OUTPATIENT)
Dept: GASTROENTEROLOGY | Age: 78
End: 2023-09-18

## 2023-09-18 LAB
FOLATE SERPL-MCNC: 8.7 NG/ML (ref 3.1–17.5)
VITAMIN B-12: 385.1 PG/ML (ref 211–911)

## 2023-09-18 NOTE — PROGRESS NOTES
This nurse called the patient @ [phone] to review lab results. However there was no answer. This nurse left a VM for the patient advising of her low iron and the need to continue with ferrous sulfate 2 tablets daily and to follow up with Dr. Maria T Pérez, this RN's direct number left.

## 2023-09-18 NOTE — TELEPHONE ENCOUNTER
Called pt. In attempts to kami repeat cscope and EGD due to PAPA.  Pt. Stated financial she was not in the best place and would call me next month to set up the procedures for Nov.

## 2023-09-19 ENCOUNTER — PROCEDURE VISIT (OUTPATIENT)
Dept: CARDIOLOGY CLINIC | Age: 78
End: 2023-09-19

## 2023-09-19 DIAGNOSIS — E78.5 DYSLIPIDEMIA: ICD-10-CM

## 2023-09-19 DIAGNOSIS — I10 ESSENTIAL HYPERTENSION: ICD-10-CM

## 2023-09-19 DIAGNOSIS — R00.2 PALPITATION: ICD-10-CM

## 2023-09-19 DIAGNOSIS — R60.0 EDEMA OF LOWER EXTREMITY: ICD-10-CM

## 2023-09-19 DIAGNOSIS — R06.02 SOB (SHORTNESS OF BREATH): Primary | ICD-10-CM

## 2023-09-19 PROBLEM — D64.9 ABSOLUTE ANEMIA: Status: ACTIVE | Noted: 2023-09-19

## 2023-09-19 PROBLEM — E78.2 MIXED HYPERLIPIDEMIA: Status: ACTIVE | Noted: 2023-09-19

## 2023-09-20 ENCOUNTER — TELEPHONE (OUTPATIENT)
Dept: CARDIOLOGY CLINIC | Age: 78
End: 2023-09-20

## 2023-09-20 NOTE — TELEPHONE ENCOUNTER
9/19/23 NM        Summary   Supervising physician Dr. Efren Rivera . Medium sized defect of mild severity which is persistent involving   anterolateral wall of myocardium suggestive of breast tissue artifact   Normal EF 64 % with normal ventricular contractility.       Recommendation   Abnormal Stress test, fixed anterolateral wall defect - likely breast tissue   artifact vs prior MI      OV to discuss results    Pt notified of results and advised to call or go to ED if she needed anything and to keep future appts

## 2023-09-26 ENCOUNTER — PROCEDURE VISIT (OUTPATIENT)
Dept: CARDIOLOGY CLINIC | Age: 78
End: 2023-09-26
Payer: MEDICARE

## 2023-09-26 DIAGNOSIS — R00.2 PALPITATION: ICD-10-CM

## 2023-09-26 DIAGNOSIS — E78.5 DYSLIPIDEMIA: ICD-10-CM

## 2023-09-26 DIAGNOSIS — I10 ESSENTIAL HYPERTENSION: ICD-10-CM

## 2023-09-26 DIAGNOSIS — R60.0 EDEMA OF LOWER EXTREMITY: ICD-10-CM

## 2023-09-26 PROCEDURE — 93306 TTE W/DOPPLER COMPLETE: CPT | Performed by: INTERNAL MEDICINE

## 2023-09-27 ENCOUNTER — TELEPHONE (OUTPATIENT)
Dept: CARDIOLOGY CLINIC | Age: 78
End: 2023-09-27

## 2023-09-27 NOTE — TELEPHONE ENCOUNTER
9/26/23 ECHO        Summary   Left ventricular function and size is normal, EF is estimated at 55-60%. Mild left ventricular hypertrophy. Grade I diastolic dysfunction. No regional wall motion abnormalities were detected. No significant valvular disease noted. Mild tricuspid regurgitation; RVSP is 30 mmHg. No evidence of pericardial effusion.     PT NOTIFIED OF RESULTS AND ADVISED TO KEEP FUTURE APPTS

## 2023-10-11 ENCOUNTER — OFFICE VISIT (OUTPATIENT)
Dept: CARDIOLOGY CLINIC | Age: 78
End: 2023-10-11
Payer: MEDICARE

## 2023-10-11 VITALS
WEIGHT: 189 LBS | DIASTOLIC BLOOD PRESSURE: 74 MMHG | HEART RATE: 68 BPM | SYSTOLIC BLOOD PRESSURE: 136 MMHG | BODY MASS INDEX: 37.11 KG/M2 | HEIGHT: 60 IN

## 2023-10-11 DIAGNOSIS — R00.2 PALPITATIONS: Primary | ICD-10-CM

## 2023-10-11 DIAGNOSIS — R06.02 SHORTNESS OF BREATH: ICD-10-CM

## 2023-10-11 DIAGNOSIS — I10 ESSENTIAL HYPERTENSION: ICD-10-CM

## 2023-10-11 PROCEDURE — 3078F DIAST BP <80 MM HG: CPT | Performed by: INTERNAL MEDICINE

## 2023-10-11 PROCEDURE — 99214 OFFICE O/P EST MOD 30 MIN: CPT | Performed by: INTERNAL MEDICINE

## 2023-10-11 PROCEDURE — 3075F SYST BP GE 130 - 139MM HG: CPT | Performed by: INTERNAL MEDICINE

## 2023-10-11 PROCEDURE — 1124F ACP DISCUSS-NO DSCNMKR DOCD: CPT | Performed by: INTERNAL MEDICINE

## 2023-10-11 NOTE — PATIENT INSTRUCTIONS
We are committed to providing you the best care possible. If you receive a survey after visiting one of our offices, please take time to share your experience concerning your physician office visit. These surveys are confidential and no health information about you is shared. We are eager to improve for you and we are counting on your feedback to help make that happen. Please be informed that if you contact our office outside of normal business hours the physician on call cannot help with any scheduling or rescheduling issues, procedure instruction questions or any type of medication issue. We advise you for any urgent/emergency that you go to the nearest emergency room! PLEASE CALL OUR OFFICE DURING NORMAL BUSINESS HOURS    Monday - Friday   8 am to 5 pm    [place]: [address]: [phone]    [place]:  [phone]  Thank you for allowing us to care for you today! We want to ensure we can follow your treatment plan and we strive to give you the best outcomes and experience possible. If you ever have a life threatening emergency and call 911 - for an ambulance (EMS)   Our providers can only care for you at:   [place] or [facility]. Even if you have someone take you or you drive yourself we can only care for you in a [facility] facility. Our providers are not setup at the other healthcare locations! **It is YOUR responsibilty to bring medication bottles and/or updated medication list to [address].  This will allow us to better serve you and all your healthcare needs**

## 2023-10-11 NOTE — PROGRESS NOTES
Elizabeth Angel MD                                  CARDIOLOGY  NOTE         Chief Complaint:    Chief Complaint   Patient presents with    Results     Pt denies any new cardiac sx states palpitations seem less than before, no surgeries or procedures scheduled that she is aware of           ECHO  9/26/2023     Left ventricular function and size is normal, EF is estimated at 55-60%. Mild left ventricular hypertrophy. Grade I diastolic dysfunction. No regional wall motion abnormalities were detected. No significant valvular disease noted. Mild tricuspid regurgitation; RVSP is 30 mmHg. No evidence of pericardial effusion. Stress MPI 9/19/2023     Medium sized defect of mild severity which is persistent involving   anterolateral wall of myocardium suggestive of breast tissue artifact   Normal EF 64 % with normal ventricular contractility. Recommendation   Abnormal Stress test, fixed anterolateral wall defect - likely breast tissue   artifact vs prior MI      OV to discuss results    7 days event monitor 9/6/2023     Indication palpitation  Minimum heart rate 57 bpm  Maximum heart rate 114 bpm  Average heart rate 72 bpm  No significant pauses  No atrial fibrillation noted        Conclusion:  Normal sinus rhythm  Normal 7-day event monitor      HPI:     Angie Jacobs is a 65y.o. year old female with prior medical history significant for essential hypertension, CKD, hyperlipidemia, obesity presents to the clinic with chief complaint of palpitation. 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 Patient mentions that with little exertion as well as stress, patient experiences palpitations and shortness of breath. She denies any chest pain. No prior established history of CAD CHF or arrhythmia  Follows up with Dr. Maribel Espinoza for essential hypertension and CKD and has been maintained on diltiazem. Patient also complains of occasional edema lower extremities.     EKG in the office today

## 2023-10-13 ENCOUNTER — HOSPITAL ENCOUNTER (OUTPATIENT)
Age: 78
Discharge: HOME | End: 2023-10-13
Payer: MEDICARE

## 2023-10-13 LAB
ALBUMIN SERPL-MCNC: 4.3 GM/DL (ref 3.4–5)
ANION GAP SERPL CALCULATED.3IONS-SCNC: 11 MMOL/L (ref 4–16)
BUN SERPL-MCNC: 29 MG/DL (ref 6–23)
CALCIUM SERPL-MCNC: 10.3 MG/DL (ref 8.3–10.6)
CHLORIDE BLD-SCNC: 103 MMOL/L (ref 99–110)
CO2: 25 MMOL/L (ref 21–32)
CREAT SERPL-MCNC: 1.4 MG/DL (ref 0.6–1.1)
GFR SERPL CREATININE-BSD FRML MDRD: 39 ML/MIN/1.73M2
GLUCOSE SERPL-MCNC: 147 MG/DL (ref 70–99)
MAGNESIUM: 1.7 MG/DL (ref 1.8–2.4)
PHOSPHORUS: 3.3 MG/DL (ref 2.5–4.9)
POTASSIUM SERPL-SCNC: 4.5 MMOL/L (ref 3.5–5.1)
SODIUM BLD-SCNC: 139 MMOL/L (ref 135–145)

## 2023-10-13 PROCEDURE — 80048 BASIC METABOLIC PNL TOTAL CA: CPT

## 2023-10-13 PROCEDURE — 81001 URINALYSIS AUTO W/SCOPE: CPT

## 2023-10-13 PROCEDURE — 82570 ASSAY OF URINE CREATININE: CPT

## 2023-10-13 PROCEDURE — 36415 COLL VENOUS BLD VENIPUNCTURE: CPT

## 2023-10-13 PROCEDURE — 83735 ASSAY OF MAGNESIUM: CPT

## 2023-10-13 PROCEDURE — 82040 ASSAY OF SERUM ALBUMIN: CPT

## 2023-10-13 PROCEDURE — 84156 ASSAY OF PROTEIN URINE: CPT

## 2023-10-13 PROCEDURE — 84100 ASSAY OF PHOSPHORUS: CPT

## 2023-10-14 LAB
BACTERIA: ABNORMAL /HPF
BILIRUBIN URINE: NEGATIVE MG/DL
BLOOD, URINE: NEGATIVE
CLARITY: ABNORMAL
COLOR: YELLOW
CREATININE URINE: 73.9 MG/DL (ref 28–217)
GLUCOSE, URINE: NEGATIVE MG/DL
KETONES, URINE: NEGATIVE MG/DL
LEUKOCYTE ESTERASE, URINE: ABNORMAL
MUCUS: ABNORMAL HPF
NITRITE URINE, QUANTITATIVE: POSITIVE
PH, URINE: 6 (ref 5–8)
PROT/CREAT RATIO, UR: 0.2
PROTEIN UA: NEGATIVE MG/DL
RBC URINE: 2 /HPF (ref 0–6)
SPECIFIC GRAVITY UA: 1.02 (ref 1–1.03)
SQUAMOUS EPITHELIAL: 5 /HPF
TRICHOMONAS: ABNORMAL /HPF
URINE TOTAL PROTEIN: 15.7 MG/DL
UROBILINOGEN, URINE: 0.2 MG/DL (ref 0.2–1)
WBC UA: 10 /HPF (ref 0–5)

## 2023-10-19 PROBLEM — E83.42 HYPOMAGNESEMIA: Status: ACTIVE | Noted: 2023-10-19

## 2023-10-23 ENCOUNTER — TELEPHONE (OUTPATIENT)
Dept: PHARMACY | Facility: CLINIC | Age: 78
End: 2023-10-23

## 2023-10-23 NOTE — TELEPHONE ENCOUNTER
POPULATION HEALTH CLINICAL PHARMACY REVIEW: ADHERENCE  Identified care gap per [employer]: fills at [facility]: Statin adherence    ASSESSMENT  [address] Records claims through 10/09/2023 (Prior Year [address] = not reported; YTD [address] = 81%; Potential Fail Date: 10/25/2023):   atorvastatin 40mg daily last filled on 05/11/2023 for 100 day supply. Next refill due: 08/19/2023    Last Rx sent on 07/20/2023 for 100ds with 2 refills    Lab Results   Component Value Date    CHOL 189 01/24/2023    TRIG 131 01/24/2023    HDL 55 01/24/2023    LDLCALC 108 (H) 01/24/2023     ALT   Date Value Ref Range Status   08/23/2023 9 (L) 10 - 40 U/L Final     AST   Date Value Ref Range Status   08/23/2023 12 (L) 15 - 37 U/L Final     PLAN  The following are interventions that have been identified:   Patient overdue refilling atorvastatin and active on home medication list.     Per [employer] My[employer] via [employer] portal, copay should be $0 for 100ds of atorvastatin at [facility]. Outreach to [employer] - they processed refill for 100ds for $0 copay. Will prepare letter regarding medication adherence and send to patient.       Recent Visits  Date Type Provider Dept   09/14/23 Office Visit Tito Marmolejo DO [facility]   [facility] [place] [place] [facility]   03/13/23 Office Visit Tito Marmolejo DO [facility]   09/13/22 Office Visit Tito Marmolejo DO [facility]   05/10/22 Office Visit Tito Marmolejo DO [facility]   Showing recent visits within past 540 days with a meds authorizing provider and meeting all other requirements  Future Appointments  Date Type Provider Dept   11/20/23 Appointment Tito Marmolejo DO [facility]   Showing future appointments within next 150 days with a meds authorizing provider and meeting all other requirements    Evelyn Bradley, PharmD, BCACP, [address] [facility], toll free: [phone], option

## 2023-10-24 ENCOUNTER — TELEPHONE (OUTPATIENT)
Dept: GASTROENTEROLOGY | Age: 78
End: 2023-10-24

## 2023-10-24 NOTE — TELEPHONE ENCOUNTER
Jennifer Berg pt called in with a Dr. Richelle Gomes referral for EGD/colonoscopy.   Can be scheduled with [place] for expediency  H&P completed  Sent to schedulers

## 2023-10-26 ENCOUNTER — TELEPHONE (OUTPATIENT)
Dept: GASTROENTEROLOGY | Age: 78
End: 2023-10-26

## 2023-11-15 ENCOUNTER — TELEPHONE (OUTPATIENT)
Dept: FAMILY MEDICINE CLINIC | Age: 78
End: 2023-11-15

## 2023-11-20 ENCOUNTER — OFFICE VISIT (OUTPATIENT)
Dept: FAMILY MEDICINE CLINIC | Age: 78
End: 2023-11-20

## 2023-11-20 ENCOUNTER — TELEPHONE (OUTPATIENT)
Dept: CARDIOLOGY CLINIC | Age: 78
End: 2023-11-20

## 2023-11-20 VITALS
HEIGHT: 60 IN | BODY MASS INDEX: 36.91 KG/M2 | SYSTOLIC BLOOD PRESSURE: 130 MMHG | WEIGHT: 188 LBS | DIASTOLIC BLOOD PRESSURE: 82 MMHG | OXYGEN SATURATION: 96 % | HEART RATE: 70 BPM

## 2023-11-20 DIAGNOSIS — C18.9 MALIGNANT NEOPLASM OF COLON, UNSPECIFIED PART OF COLON (HCC): ICD-10-CM

## 2023-11-20 DIAGNOSIS — I10 ESSENTIAL HYPERTENSION: Primary | ICD-10-CM

## 2023-11-20 DIAGNOSIS — D50.8 OTHER IRON DEFICIENCY ANEMIA: ICD-10-CM

## 2023-11-20 DIAGNOSIS — E61.1 IRON DEFICIENCY: Chronic | ICD-10-CM

## 2023-11-20 DIAGNOSIS — N18.32 STAGE 3B CHRONIC KIDNEY DISEASE (HCC): ICD-10-CM

## 2023-11-20 DIAGNOSIS — R11.0 NAUSEA: ICD-10-CM

## 2023-11-20 NOTE — PROGRESS NOTES
11/26/2023    Kera Ortega    Chief Complaint   Patient presents with    2 month f/u anemia/ cariac testing     - anemia - continuing fatigue. Currently taking ferrous gluconate 324 mg 2 qd       HPI  History was obtained from patient. Cait Denny is a 65 y.o. female with a PMHx as listed below who presents today for 2 month follow up. No acute complaints. Bp excellent today  Palpitations, stress, nl tsh  Stress test nl, cardiac workup normal     Plan for diagnostic colonoscopy in January     Cancer monitored by Dr. Black repeat CT 5/18/23  1. Essential hypertension    2. Nausea    3. Malignant neoplasm of colon, unspecified part of colon ([address])    4. Iron deficiency    5. Other iron deficiency anemia    6. Stage 3b chronic kidney disease ([address])             REVIEW OF SYMPTOMS    Review of Systems   Constitutional:  Negative for chills and fatigue. HENT:  Negative for congestion and sore throat. Respiratory:  Negative for shortness of breath and wheezing. Cardiovascular:  Negative for chest pain and palpitations. Gastrointestinal:  Negative for abdominal pain and nausea. Genitourinary:  Negative for frequency and urgency. Neurological:  Negative for light-headedness. PAST MEDICAL HISTORY  Past Medical History:   Diagnosis Date    Abnormal EKG      see ekg report-11/4/2016-\"have appt 11/18/2016 to see Dr Jorje Hector to get heart clearance for surgery - 11/21/2016\"    Anemia     Chemotherapy induced diarrhea     Chronic kidney disease, stage 3 (HCC)     Colon Cancer Dx 1-8-19    Colonoscopy, tx surg- following with Dr Black to start chemo    DVT (deep vein thrombosis) in pregnancy     Essential hypertension     History of blood transfusion 1960's    No Reaction To Blood Transfusion Received    Hx of blood clots     left leg    Hx of colonic polyp     Hx of echocardiogram 09/26/2023    Mild tricuspid regurgitation;Mild left ventricular hypertrophy.     Hyperlipidemia     Hypertension     Impaired fasting

## 2023-11-21 ENCOUNTER — CLINICAL DOCUMENTATION (OUTPATIENT)
Dept: ONCOLOGY | Age: 78
End: 2023-11-21

## 2023-11-26 ENCOUNTER — TELEPHONE (OUTPATIENT)
Dept: FAMILY MEDICINE CLINIC | Age: 78
End: 2023-11-26

## 2023-11-26 ASSESSMENT — ENCOUNTER SYMPTOMS
SORE THROAT: 0
ABDOMINAL PAIN: 0
SHORTNESS OF BREATH: 0
NAUSEA: 0
WHEEZING: 0

## 2023-12-14 ENCOUNTER — TELEPHONE (OUTPATIENT)
Dept: GASTROENTEROLOGY | Age: 78
End: 2023-12-14

## 2023-12-14 NOTE — TELEPHONE ENCOUNTER
Per my call to [facility] at [facility]; C/E are pending auth.  Pending case# [id number] valid to 4/7/24

## 2023-12-29 ENCOUNTER — TELEPHONE (OUTPATIENT)
Dept: GASTROENTEROLOGY | Age: 78
End: 2023-12-29

## 2024-01-03 NOTE — PROGRESS NOTES
Patient will arrive at 0630 at [facility] on 1/9/2024 for her procedure at 0800.    NOTHING TO EAT OR DRINK AFTER MIDNIGHT DAY OF SURGERY    1. Enter thru the hospital main entrance on day of surgery, check in at the Information Desk. If you arrive prior to 6:00am, enter thru the ER entrance.    2. Follow the directions as prescribed by the doctor for your procedure and medications.         Morning of surgery take:diltiazem and pepcid.         Stop vitamins, supplements and NSAIDS:      3. Check with your Doctor regarding stopping blood thinners and follow their instructions.    4. Do not smoke, vape or use chewing tobacco morning of surgery. Do not drink any alcoholic beverages 24 hours prior to surgery.       This includes NA Beer. No street drugs 7 days prior to surgery.    5. If you have dentures, contacts of glasses they will be removed before going to the OR; please bring a case.    6. Please bring picture ID, insurance card, paperwork from the doctor’s office (H & P, Consent, & card for implantable devices).

## 2024-01-03 NOTE — PROGRESS NOTES
"Progress Note  Hospital Medicine    Primary Team: [facility]  Admit Date: 11/16/2018   Length of Stay:  LOS: 8 days   SUBJECTIVE:   Reason for Admission:  Acute respiratory failure with hypoxia    HPI:  Zbigniew Forman is a 89M [employer]  with HTN who presents for evaluation of SOB and worsening LE edema. He states that his SOB started 2-3 weeks ago, and acutely worsened over the last few days prompting him to present to the ED. Family and patient report orthopnea, but patient insists of sleeping flat. He does report some cough and wheeze, but no fever or recent sick contacts. He does not use oxygen at home and lives with his daughter. He denies any chest pain, no syncope,  He does report some dizziness when he moves around too much. No recent sick contacts. He denies every being told that he has issues with HF. He lost his wife of 74 years in May. 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 He denies history of COPD. He did have relief with breathing treatment.     Hospital Course:  Mr. Forman was admitted to hospital medicine on 11/17 for ADCHF. He was diuresed on IV lasix and transitioned to oral lasix on 11/18. Due to concern for new infiltrate on CXR and productive cough, antibiotics were started for presumptive PNA. Procal, LA and WBC resulted wnl, but patient felt subjectively improved and decision was made to complete 5d course (Levaquin). PT/OT were ordered for post-discharge needs, now recommending SNF.     Interval history:    No acute events overnight.  Nursing noted pt to be confused this morning; he confirms this and reports "that happens a lot".    Review of Systems:  Constitutional: no fever or chills  Respiratory: no cough or shortness of breath  Cardiovascular: no chest pain or palpitations  Gastrointestinal: no nausea or vomiting, no abdominal pain or change in bowel habits  Musculoskeletal: no arthralgias or myalgias     OBJECTIVE:     Temp:  [97.2 °F (36.2 °C)-99 °F " Left message for patient to call back for PAT.   (37.2 °C)]   Pulse:  [75-99]   Resp:  [16-20]   BP: ()/(52-77)   SpO2:  [93 %-99 %]  Body mass index is 24.67 kg/m².  Intake/Outake:  This Shift:  No intake/output data recorded.    Net I/O past 24h:     Intake/Output Summary (Last 24 hours) at 11/25/2018 1752  Last data filed at 11/24/2018 1800  Gross per 24 hour   Intake 200 ml   Output 50 ml   Net 150 ml             Physical Exam:  Gen- well-developed, well-nourished, NAD  CVS- S1 and S2 present, RRR  Resp- few wheezes b/l, no work of breathing  Abd- BS+, soft, NT, ND  Ext- no clubbing, cyanosis, or edema    Laboratory:  CBC/Anemia Labs: Coags:    No results for input(s): WBC, HGB, HCT, PLT, MCV, RDW, IRON, FERRITIN, RETIC, FOLATE, QEODEOMR25, OCCULTBLOOD in the last 168 hours.    Invalid input(s): IRONSATURATED No results for input(s): PT, INR, APTT in the last 168 hours.     Chemistries:   Recent Labs   Lab 11/23/18  0357 11/24/18  0406 11/25/18  0516    141 140   K 4.2 4.7 4.5    108 106   CO2 27 23 25   BUN 31* 33* 30   CREATININE 1.0 0.9 0.8   CALCIUM 8.6* 8.5* 8.4*        Medications:  Scheduled Meds:   amLODIPine  5 mg Oral Daily    enoxaparin  40 mg Subcutaneous Daily    levalbuterol  0.63 mg Nebulization Q8H    lisinopril  20 mg Oral Daily    polyethylene glycol  17 g Oral Daily                             Continuous Infusions:  PRN Meds:.acetaminophen, bisacodyl, dextrose 50%, dextrose 50%, glucagon (human recombinant), glucose, glucose, ibuprofen, ondansetron, ondansetron, ramelteon, sodium chloride 0.9%     ASSESSMENT/PLAN:     * Acute respiratory failure with hypoxia     - presentation clinically consistent with decompensated HF; BNP elevated, CXR with edema, overloaded on exam  - on 3L NC, weaning as tolerated  - lasix 40 mg IVP BID, transitioned to oral lasix 40 mg daily  - Continue lisinopril, amlodipine  - strict I/Os, daily weights, fluid restriction, tele  - concerned about possible CAP, completed treatment with  Levaquin.      DNR (do not resuscitate)     - discussed with family and patient      Elevated troponin     - chronically elevated  - trending flat, no EKG change, no CP      Hypertension associated with diabetes     - labile  -changed Amlodipine to 5mg and Lisinopril to 20mg; tolerating this well      Chronic kidney disease, stage III (moderate)     - chronic and stable      Controlled type 2 diabetes mellitus without complication, without long-term current use of insulin     - diet controlled, A1c 6.5 08/2018  - low dose SSI for now     Urethral/Penile pain  -2/2 whiteside trauma  -treating with PRN Tylenol, Ibuprofen  -UA clear    DVT ppx- Lovenox  CODE Status- FULL    Dispo- pending SNF placement; daughter prefers [facility] but will look at list of other facilities vs taking pt home pending progress    Nathalie Briceño MD  Hospital Medicine Staff

## 2024-01-06 ENCOUNTER — HOSPITAL ENCOUNTER (OUTPATIENT)
Age: 79
Discharge: HOME | End: 2024-01-06
Payer: MEDICARE

## 2024-01-06 DIAGNOSIS — N18.32 STAGE 3B CHRONIC KIDNEY DISEASE (HCC): ICD-10-CM

## 2024-01-06 DIAGNOSIS — C18.9 MALIGNANT NEOPLASM OF COLON, UNSPECIFIED PART OF COLON (HCC): ICD-10-CM

## 2024-01-06 DIAGNOSIS — E83.42 HYPOMAGNESEMIA: ICD-10-CM

## 2024-01-06 DIAGNOSIS — E61.1 IRON DEFICIENCY: Chronic | ICD-10-CM

## 2024-01-06 DIAGNOSIS — I10 ESSENTIAL HYPERTENSION: ICD-10-CM

## 2024-01-06 DIAGNOSIS — R31.1 BENIGN ESSENTIAL MICROSCOPIC HEMATURIA: ICD-10-CM

## 2024-01-06 LAB
ALBUMIN SERPL-MCNC: 4.3 GM/DL (ref 3.4–5)
ANION GAP SERPL CALCULATED.3IONS-SCNC: 11 MMOL/L (ref 7–16)
BASOPHILS ABSOLUTE: 0 K/CU MM
BASOPHILS RELATIVE PERCENT: 0.6 % (ref 0–1)
BUN SERPL-MCNC: 25 MG/DL (ref 6–23)
CALCIUM SERPL-MCNC: 10 MG/DL (ref 8.3–10.6)
CHLORIDE BLD-SCNC: 104 MMOL/L (ref 99–110)
CO2: 27 MMOL/L (ref 21–32)
CREAT SERPL-MCNC: 1.4 MG/DL (ref 0.6–1.1)
DIFFERENTIAL TYPE: ABNORMAL
EOSINOPHILS ABSOLUTE: 0.1 K/CU MM
EOSINOPHILS RELATIVE PERCENT: 2.2 % (ref 0–3)
GFR SERPL CREATININE-BSD FRML MDRD: 38 ML/MIN/1.73M2
GLUCOSE SERPL-MCNC: 136 MG/DL (ref 70–99)
HCT VFR BLD CALC: 41.4 % (ref 37–47)
HEMOGLOBIN: 12.9 GM/DL (ref 12.5–16)
IMMATURE NEUTROPHIL %: 0.3 % (ref 0–0.43)
LYMPHOCYTES ABSOLUTE: 1.1 K/CU MM
LYMPHOCYTES RELATIVE PERCENT: 16.4 % (ref 24–44)
MAGNESIUM: 1.7 MG/DL (ref 1.8–2.4)
MCH RBC QN AUTO: 27.6 PG (ref 27–31)
MCHC RBC AUTO-ENTMCNC: 31.2 % (ref 32–36)
MCV RBC AUTO: 88.5 FL (ref 78–100)
MONOCYTES ABSOLUTE: 0.4 K/CU MM
MONOCYTES RELATIVE PERCENT: 5.9 % (ref 0–4)
NUCLEATED RBC %: 0 %
PDW BLD-RTO: 13.8 % (ref 11.7–14.9)
PHOSPHORUS: 2.7 MG/DL (ref 2.5–4.9)
PLATELET # BLD: 194 K/CU MM (ref 140–440)
PMV BLD AUTO: 10.4 FL (ref 7.5–11.1)
POTASSIUM SERPL-SCNC: 4.7 MMOL/L (ref 3.5–5.1)
RBC # BLD: 4.68 M/CU MM (ref 4.2–5.4)
SEGMENTED NEUTROPHILS ABSOLUTE COUNT: 4.8 K/CU MM
SEGMENTED NEUTROPHILS RELATIVE PERCENT: 74.6 % (ref 36–66)
SODIUM BLD-SCNC: 142 MMOL/L (ref 135–145)
TOTAL IMMATURE NEUTOROPHIL: 0.02 K/CU MM
TOTAL NUCLEATED RBC: 0 K/CU MM
WBC # BLD: 6.4 K/CU MM (ref 4–10.5)

## 2024-01-06 PROCEDURE — 84100 ASSAY OF PHOSPHORUS: CPT

## 2024-01-06 PROCEDURE — 83735 ASSAY OF MAGNESIUM: CPT

## 2024-01-06 PROCEDURE — 85025 COMPLETE CBC W/AUTO DIFF WBC: CPT

## 2024-01-06 PROCEDURE — 80048 BASIC METABOLIC PNL TOTAL CA: CPT

## 2024-01-06 PROCEDURE — 82040 ASSAY OF SERUM ALBUMIN: CPT

## 2024-01-06 PROCEDURE — 36415 COLL VENOUS BLD VENIPUNCTURE: CPT

## 2024-01-07 NOTE — H&P
I have examined the patient within 24 hours  before the procedure and there is no change in the previous history and physical exam,which has been reviewed.There is no history of sleep apnea, snoring, or stridor.  There has been no  previous adverse experience with sedation/anesthesia. There is no increased risk for aspiration of gastric contents.  The patient has been instructed that all resuscitative measures (during the operative and immediate perioperative period) will be instituted in the unlikely event that they will be needed.The patient has no pertinent past surgical or FH other than listed in the original H&P.    ASA Class: 3  AIRWAY Class: 1    Consent form signed, witnessed and in soft chart

## 2024-01-08 ENCOUNTER — HOSPITAL ENCOUNTER (OUTPATIENT)
Age: 79
Setting detail: SPECIMEN
Discharge: HOME | End: 2024-01-08
Payer: MEDICARE

## 2024-01-08 ENCOUNTER — ANESTHESIA EVENT (OUTPATIENT)
Dept: ENDOSCOPY | Age: 79
End: 2024-01-08
Payer: MEDICARE

## 2024-01-08 LAB
CREATININE URINE: 76.1 MG/DL (ref 28–217)
PROT/CREAT RATIO, UR: 0.1
URINE TOTAL PROTEIN: 10.5 MG/DL

## 2024-01-08 PROCEDURE — 84156 ASSAY OF PROTEIN URINE: CPT

## 2024-01-08 PROCEDURE — 82570 ASSAY OF URINE CREATININE: CPT

## 2024-01-08 NOTE — PROGRESS NOTES
Spoke with patient and she will arrive at 0630 at [facility] on 1/9/2024 for her procedure at 0800.IV order is in epic.

## 2024-01-09 ENCOUNTER — HOSPITAL ENCOUNTER (OUTPATIENT)
Age: 79
Setting detail: OUTPATIENT SURGERY
Discharge: HOME | End: 2024-01-09
Attending: SPECIALIST | Admitting: SPECIALIST
Payer: MEDICARE

## 2024-01-09 ENCOUNTER — ANESTHESIA (OUTPATIENT)
Dept: ENDOSCOPY | Age: 79
End: 2024-01-09
Payer: MEDICARE

## 2024-01-09 VITALS
TEMPERATURE: 98.1 F | RESPIRATION RATE: 20 BRPM | HEART RATE: 64 BPM | HEIGHT: 60 IN | SYSTOLIC BLOOD PRESSURE: 151 MMHG | OXYGEN SATURATION: 93 % | BODY MASS INDEX: 36.91 KG/M2 | DIASTOLIC BLOOD PRESSURE: 59 MMHG | WEIGHT: 188 LBS

## 2024-01-09 DIAGNOSIS — D50.9 IRON DEFICIENCY ANEMIA, UNSPECIFIED IRON DEFICIENCY ANEMIA TYPE: ICD-10-CM

## 2024-01-09 PROBLEM — D12.2 BENIGN NEOPLASM OF ASCENDING COLON: Status: ACTIVE | Noted: 2024-01-09

## 2024-01-09 PROBLEM — D12.3 BENIGN NEOPLASM OF TRANSVERSE COLON: Status: ACTIVE | Noted: 2024-01-09

## 2024-01-09 PROBLEM — K31.819 AVM (ARTERIOVENOUS MALFORMATION) OF STOMACH, ACQUIRED: Status: ACTIVE | Noted: 2024-01-09

## 2024-01-09 PROCEDURE — 3700000001 HC ADD 15 MINUTES (ANESTHESIA): Performed by: SPECIALIST

## 2024-01-09 PROCEDURE — 2720000010 HC SURG SUPPLY STERILE: Performed by: SPECIALIST

## 2024-01-09 PROCEDURE — C1889 IMPLANT/INSERT DEVICE, NOC: HCPCS | Performed by: SPECIALIST

## 2024-01-09 PROCEDURE — 3609013000 HC EGD TRANSORAL CONTROL BLEEDING ANY METHOD: Performed by: SPECIALIST

## 2024-01-09 PROCEDURE — 2709999900 HC NON-CHARGEABLE SUPPLY: Performed by: SPECIALIST

## 2024-01-09 PROCEDURE — 3609010600 HC COLONOSCOPY POLYPECTOMY SNARE/COLD BIOPSY: Performed by: SPECIALIST

## 2024-01-09 PROCEDURE — 88305 TISSUE EXAM BY PATHOLOGIST: CPT | Performed by: PATHOLOGY

## 2024-01-09 PROCEDURE — 3700000000 HC ANESTHESIA ATTENDED CARE: Performed by: SPECIALIST

## 2024-01-09 PROCEDURE — 6360000002 HC RX W HCPCS: Performed by: NURSE ANESTHETIST, CERTIFIED REGISTERED

## 2024-01-09 PROCEDURE — 7100000010 HC PHASE II RECOVERY - FIRST 15 MIN: Performed by: SPECIALIST

## 2024-01-09 PROCEDURE — 7100000011 HC PHASE II RECOVERY - ADDTL 15 MIN: Performed by: SPECIALIST

## 2024-01-09 DEVICE — CLIP LIG L235CM RESOL 360 BX/20: Type: IMPLANTABLE DEVICE | Site: STOMACH | Status: FUNCTIONAL

## 2024-01-09 RX ORDER — SODIUM CHLORIDE, SODIUM LACTATE, POTASSIUM CHLORIDE, CALCIUM CHLORIDE 600; 310; 30; 20 MG/100ML; MG/100ML; MG/100ML; MG/100ML
INJECTION, SOLUTION INTRAVENOUS CONTINUOUS
Status: DISCONTINUED | OUTPATIENT
Start: 2024-01-09 | End: 2024-01-09 | Stop reason: HOSPADM

## 2024-01-09 RX ORDER — PROPOFOL 10 MG/ML
INJECTION, EMULSION INTRAVENOUS PRN
Status: DISCONTINUED | OUTPATIENT
Start: 2024-01-09 | End: 2024-01-09 | Stop reason: SDUPTHER

## 2024-01-09 RX ADMIN — PHENYLEPHRINE HYDROCHLORIDE 100 MCG: 10 INJECTION INTRAVENOUS at 09:02

## 2024-01-09 RX ADMIN — PROPOFOL 700 MG: 10 INJECTION, EMULSION INTRAVENOUS at 08:03

## 2024-01-09 ASSESSMENT — PAIN - FUNCTIONAL ASSESSMENT
PAIN_FUNCTIONAL_ASSESSMENT: 0-10
PAIN_FUNCTIONAL_ASSESSMENT: 0-10

## 2024-01-09 NOTE — ANESTHESIA PRE PROCEDURE
85.6 kg (188 lb 12.8 oz)     Body mass index is 36.72 kg/m².    CBC:   Lab Results   Component Value Date/Time    WBC 6.4 01/06/2024 10:48 AM    RBC 4.68 01/06/2024 10:48 AM    HGB 12.9 01/06/2024 10:48 AM    HCT 41.4 01/06/2024 10:48 AM    MCV 88.5 01/06/2024 10:48 AM    RDW 13.8 01/06/2024 10:48 AM     01/06/2024 10:48 AM       CMP:   Lab Results   Component Value Date/Time     01/06/2024 10:48 AM     06/21/2021 11:14 AM    K 4.7 01/06/2024 10:48 AM     01/06/2024 10:48 AM    CO2 27 01/06/2024 10:48 AM    BUN 25 01/06/2024 10:48 AM    CREATININE 1.4 01/06/2024 10:48 AM    GFRAA 48 06/29/2022 11:12 AM    AGRATIO 1.6 08/23/2023 09:36 AM    LABGLOM 38 01/06/2024 10:48 AM    GLUCOSE 136 01/06/2024 10:48 AM    PROT 7.0 08/23/2023 09:36 AM    CALCIUM 10.0 01/06/2024 10:48 AM    BILITOT 0.3 08/23/2023 09:36 AM    ALKPHOS 103 08/23/2023 09:36 AM    AST 12 08/23/2023 09:36 AM    ALT 9 08/23/2023 09:36 AM       POC Tests: No results for input(s): \"POCGLU\", \"POCNA\", \"POCK\", \"POCCL\", \"POCBUN\", \"POCHEMO\", \"POCHCT\" in the last 72 hours.    Coags:   Lab Results   Component Value Date/Time    PROTIME 12.6 03/12/2019 12:24 AM    INR 1.09 03/12/2019 12:24 AM    APTT >240.0 03/12/2019 07:53 AM       HCG (If Applicable): No results found for: \"PREGTESTUR\", \"PREGSERUM\", \"HCG\", \"HCGQUANT\"     ABGs: No results found for: \"PHART\", \"PO2ART\", \"LCJ3SCS\", \"JJI3OTO\", \"BEART\", \"C6IPDPOF\"     Type & Screen (If Applicable):  No results found for: \"LABABO\", \"LABRH\"    Drug/Infectious Status (If Applicable):  No results found for: \"HIV\", \"HEPCAB\"    COVID-19 Screening (If Applicable):   Lab Results   Component Value Date/Time    COVID19 NOT DETECTED 08/12/2020 06:30 AM         Anesthesia Evaluation  Patient summary reviewed   history of anesthetic complications: PONV.  Airway: Mallampati: II  TM distance: >3 FB   Neck ROM: full     Dental:    (+) other      Pulmonary:normal exam  breath sounds clear to 
hours.    Coags:   Lab Results   Component Value Date/Time    PROTIME 12.6 03/12/2019 12:24 AM    INR 1.09 03/12/2019 12:24 AM    APTT >240.0 03/12/2019 07:53 AM       HCG (If Applicable): No results found for: \"PREGTESTUR\", \"PREGSERUM\", \"HCG\", \"HCGQUANT\"     ABGs: No results found for: \"PHART\", \"PO2ART\", \"ZRM1EGC\", \"OQR9QZA\", \"BEART\", \"D3YBFVEG\"     Type & Screen (If Applicable):  No results found for: \"LABABO\", \"LABRH\"    Drug/Infectious Status (If Applicable):  No results found for: \"HIV\", \"HEPCAB\"    COVID-19 Screening (If Applicable):   Lab Results   Component Value Date/Time    COVID19 NOT DETECTED 08/12/2020 06:30 AM         Anesthesia Evaluation  Patient summary reviewed   history of anesthetic complications: PONV.  Airway: Mallampati: II          Dental:          Pulmonary:normal exam                               Cardiovascular:    (+) hypertension:, hyperlipidemia      ECG reviewed      Echocardiogram reviewed               ROS comment:  TTE procedure:ECHOCARDIOGRAM COMPLETE 2D W DOPPLER W COLOR.     Procedure Date  Date: 09/26/2023 Start: 08:52 AM      Summary   Left ventricular function and size is normal, EF is estimated at 55-60%.   Mild left ventricular hypertrophy.   Grade I diastolic dysfunction.   No regional wall motion abnormalities were detected.   No significant valvular disease noted.   Mild tricuspid regurgitation; RVSP is 30 mmHg.   No evidence of pericardial effusion.      Signature      ------------------------------------------------------------------   Electronically signed by Prasanna Arias MD (Interpreting   physician) on 09/26/2023 at 04:26 PM   ------------------------------------------------------------------          Neuro/Psych:   (+) psychiatric history:             ROS comment: Depression  GI/Hepatic/Renal:   (+) renal disease: CRI, bowel prep, morbid obesity         ROS comment: Ct  1. Stable 10 mm ground-glass nodule in the left upper lobe as well as a 3-4   mm solid nodule

## 2024-01-09 NOTE — BRIEF OP NOTE
BRIEF COLONOSCOPY REPORT:   The original colonoscopy report with photos can be found by going to \"chart review\" then \"procedures\" then double click on \"colonoscopy\"    Impression:         -  The examined portion of the ileum was normal.         -  One 5 mm polyp in the proximal ascending colon, removed with a cold snare.            Resected and retrieved.         -  Three 5 mm polyps at the splenic flexure, removed with a cold snare.            Resected and retrieved.         -  Internal hemorrhoids.         -  The examination was otherwise normal on direct and retroflexion views.    Recommendation:           -  Repeat colonoscopy in 3 years for surveillance.    BRIEF EGD REPORT:     The original EGD report with photos  available by going to \"chart review\" then \"procedures\" then double click on \"EGD\"     Impression:         -  Normal esophagus.         - slight but definite coffee-ground material present in the stomach on initial            entry         - large hiatal hernia without Leon lesions         - definite AVM in the upper stomach within the hiatal hernia pouch along the            posterior wall-- coagulated with the APC and one hemostatic clip successfully            applied as the AVM continued to ooze slightly after APC coag.         - Stomach otherwise normal         -  No specimens collected.    Recommendation:         - continue oral iron and monitoring H/H         - if recurrent iron deficiency anemia would refer for capsule endoscopy

## 2024-01-09 NOTE — PROGRESS NOTES
01/09/24 0737   Encounter Summary   Encounter Overview/Reason  Pre-Procedural   Service Provided For: Patient   Referral/Consult From: [facility]   Last Encounter  01/09/24  (Spiritual and emotional support given with blessings.  No other needs at this time. Patient informed  how to contact .)   Complexity of Encounter Low   Begin Time 0645   End Time  0650   Total Time Calculated 5 min   Spiritual/Emotional needs   Type Spiritual Support   Assessment/Intervention/Outcome   Assessment Calm;Coping   Intervention Active listening;Facilitated forgiveness;Nurtured Hope;Prayer (assurance of)/[place];Sustaining Presence/Ministry of presence   Outcome Comfort;Coping;Encouraged;Engaged in conversation;Expressed feelings, needs, and concerns;Expressed Gratitude   Plan and Referrals   Plan/Referrals Provided reading/devotional materials;Continue Support (comment)  (Patient informed how to contact )

## 2024-01-09 NOTE — PROGRESS NOTES
0911- Patient arrived back to [facility]. Report given to this nurse from  Freda KHADAR DAVILA. Patient A&O, No C/O pain.Beverage of choice offered to patient. Call light in reach and bed in lowest position. Discharge instructions reviewed with patient and her friend Echo understanding verbalized.  0945-IV removed,  Patient sitting on side of bed getting dressed .   0950-Patient escorted to car via wheelchair transported home by friend Echo.

## 2024-01-09 NOTE — DISCHARGE INSTRUCTIONS
for you to recover. But each person recovers at a different pace. Follow the steps below to get better as quickly as possible.    How can you care for yourself at home?  Activity  Rest as much as you need to after you go home.  You should be able to go back to your usual activities the day after the test.  Diet  Follow your doctor's directions for eating after the test.  Drink plenty of fluids (unless your doctor has told you not to).  Medications  If you have a sore throat the day after the test, use an over-the-counter spray to numb your throat.  Your doctor will tell you if and when you can restart your medicines. He or she will also give you instructions about taking any new medicines.  If you take blood thinners, such as warfarin (Coumadin), clopidogrel (Plavix), or aspirin, be sure to talk to your doctor. He or she will tell you if and when to start taking those medicines again. Make sure that you understand exactly what your doctor wants you to do.  If a biopsy was done during the test, your doctor may tell you not to take aspirin or other anti-inflammatory medicines for a few days. These include ibuprofen (Advil, Motrin) and naproxen (Aleve).  DO NOT DRINK  ALCOHOL TODAY.    Other instructions:Anesthesia  For your safety, do not drive or operate machinery for 24 hours.  Do not sign legal documents or make major decisions for 24 hours. The anesthesia can make it hard for you to fully understand what you are agreeing to.      Follow-up care is a key part of your treatment and safety. Be sure to make and go to all appointments, and call your doctor if you are having problems. It's also a good idea to know your test results and keep a list of the medicines you take.  When should you call for help?  [facility] -[phone] OR [facility] [phone]  Call 911 anytime you think you may need emergency care. For example, call if:  You passed out (lost

## 2024-01-09 NOTE — ANESTHESIA POSTPROCEDURE EVALUATION
Department of Anesthesiology  Postprocedure Note    Patient: Martina Edwards  MRN: [id number]  YOB: 1945  Date of evaluation: 1/9/2024    Procedure Summary       Date: 01/09/24 Room / Location: [place] [address] / [facility]    Anesthesia Start: 0756 Anesthesia Stop: 0856    Procedures:       COLONOSCOPY POLYPECTOMY SNARE/COLD BIOPSY of lower ascending colon, at splenic flexure polyps      EGD CONTROL HEMORRHAGE with APC straight fire to stomach and 1 hemoclip placed Diagnosis:       Iron deficiency anemia, unspecified iron deficiency anemia type      (Iron deficiency anemia, unspecified iron deficiency anemia type [D50.9])    Surgeons: Shay Berg MD Responsible Provider: Ty Becerra MD    Anesthesia Type: MAC ASA Status: 3            Anesthesia Type: No value filed.    Steven Phase I: Steven Score: 10    Steven Phase II: Steven Score: 10    Anesthesia Post Evaluation    Patient location during evaluation: PACU  Patient participation: complete - patient participated  Cardiovascular status: hemodynamically stable  Respiratory status: acceptable  Hydration status: euvolemic    There were no known notable events for this encounter.

## 2024-01-09 NOTE — PROGRESS NOTES
Patient is back to baseline. Alert and oriented.  Side rails up x 2, call light in reach. Report given to KHADAR Ruiz. Friend, Echo at bedside.

## 2024-01-30 ENCOUNTER — HOSPITAL ENCOUNTER (OUTPATIENT)
Dept: INFUSION THERAPY | Age: 79
Discharge: HOME | End: 2024-01-30
Payer: MEDICARE

## 2024-01-30 ENCOUNTER — OFFICE VISIT (OUTPATIENT)
Dept: ONCOLOGY | Age: 79
End: 2024-01-30
Payer: MEDICARE

## 2024-01-30 VITALS
DIASTOLIC BLOOD PRESSURE: 66 MMHG | TEMPERATURE: 97.2 F | HEIGHT: 60 IN | HEART RATE: 84 BPM | RESPIRATION RATE: 18 BRPM | SYSTOLIC BLOOD PRESSURE: 146 MMHG | OXYGEN SATURATION: 97 % | WEIGHT: 187.4 LBS | BODY MASS INDEX: 36.79 KG/M2

## 2024-01-30 DIAGNOSIS — C50.912 INVASIVE DUCTAL CARCINOMA OF LEFT BREAST (HCC): Primary | ICD-10-CM

## 2024-01-30 DIAGNOSIS — E61.1 IRON DEFICIENCY: ICD-10-CM

## 2024-01-30 DIAGNOSIS — C18.7 CANCER OF SIGMOID COLON (HCC): ICD-10-CM

## 2024-01-30 PROCEDURE — 3078F DIAST BP <80 MM HG: CPT | Performed by: INTERNAL MEDICINE

## 2024-01-30 PROCEDURE — 99211 OFF/OP EST MAY X REQ PHY/QHP: CPT

## 2024-01-30 PROCEDURE — 1124F ACP DISCUSS-NO DSCNMKR DOCD: CPT | Performed by: INTERNAL MEDICINE

## 2024-01-30 PROCEDURE — 3077F SYST BP >= 140 MM HG: CPT | Performed by: INTERNAL MEDICINE

## 2024-01-30 PROCEDURE — 99213 OFFICE O/P EST LOW 20 MIN: CPT | Performed by: INTERNAL MEDICINE

## 2024-01-30 NOTE — PROGRESS NOTES
MA Rooming Questions  Patient: Martina Edwards  MRN: [id number]    Date: 1/30/2024        1. Do you have any new issues?   no         2. Do you need any refills on medications?    no    3. Have you had any imaging done since your last visit?   no    4. Have you been hospitalized or seen in the emergency room since your last visit here?   no    5. Did the patient have a depression screening completed today? No    No data recorded     PHQ-9 Given to (if applicable):               PHQ-9 Score (if applicable):                     [] Positive     []  Negative              Does question #9 need addressed (if applicable)                     [] Yes    []  No               Shey Hernandez MA      
apparent distress   EYES: pupils equal, round and reactive to light, sclera clear, normal conjunctiva  ENT: Normocephalic, without obvious abnormality, atraumatic  NECK: supple, symmetrical, no jugular venous distension, no carotid bruits   HEMATOLOGIC/LYMPHATIC: no cervical, supraclavicular or axillary lymphadenopathy   LUNGS: VBS, no wheezes, no increased work of breathing, no rhonchi, clear to auscultation, no crackles,    CARDIOVASCULAR: regular rate and rhythm, normal S1 and S2, no murmur noted  ABDOMEN: normal bowel sounds x 4, soft, non-distended, non-tender, no masses palpated, no hepatosplenomegaly   MUSCULOSKELETAL: full range of motion noted, tone is normal  NEUROLOGIC: awake, alert, oriented to name, place and time. Motor skills grossly intact.   SKIN: appears intact, normal skin color, normal texture, normal turgor, no jaundice.   EXTREMITIES: no LE edema, no clubbing, no leg swelling, no cyanosis,       Labs:  Hematology:  Lab Results   Component Value Date    WBC 6.4 01/06/2024    RBC 4.68 01/06/2024    HGB 12.9 01/06/2024    HCT 41.4 01/06/2024    MCV 88.5 01/06/2024    MCH 27.6 01/06/2024    MCHC 31.2 (L) 01/06/2024    RDW 13.8 01/06/2024     01/06/2024    MPV 10.4 01/06/2024    SEGSPCT 74.6 (H) 01/06/2024    EOSRELPCT 2.2 01/06/2024    BASOPCT 0.6 01/06/2024    LYMPHOPCT 16.4 (L) 01/06/2024    MONOPCT 5.9 (H) 01/06/2024    BANDABS 4.4 03/24/2022    SEGSABS 4.8 01/06/2024    EOSABS 0.1 01/06/2024    BASOSABS 0.0 01/06/2024    LYMPHSABS 1.1 01/06/2024    MONOSABS 0.4 01/06/2024    DIFFTYPE AUTOMATED DIFFERENTIAL 01/06/2024    ANISOCYTOSIS 1+ (A) 08/23/2023    POLYCHROM Occasional (A) 08/23/2023    WBCMORP NORMAL 03/24/2022    PLTM FEW  LARGE  PLT NOTED ON SCAN   12/13/2019     No results found for: \"ESR\"  Chemistry:  Lab Results   Component Value Date     01/06/2024    K 4.7 01/06/2024     01/06/2024    CO2 27 01/06/2024    BUN 25 (H) 01/06/2024    CREATININE 1.4 (H) 01/06/2024

## 2024-02-01 ENCOUNTER — TELEMEDICINE (OUTPATIENT)
Dept: FAMILY MEDICINE CLINIC | Age: 79
End: 2024-02-01

## 2024-02-01 DIAGNOSIS — Z00.00 MEDICARE ANNUAL WELLNESS VISIT, SUBSEQUENT: Primary | ICD-10-CM

## 2024-02-01 SDOH — ECONOMIC STABILITY: FOOD INSECURITY: WITHIN THE PAST 12 MONTHS, YOU WORRIED THAT YOUR FOOD WOULD RUN OUT BEFORE YOU GOT MONEY TO BUY MORE.: NEVER TRUE

## 2024-02-01 SDOH — ECONOMIC STABILITY: INCOME INSECURITY: HOW HARD IS IT FOR YOU TO PAY FOR THE VERY BASICS LIKE FOOD, HOUSING, MEDICAL CARE, AND HEATING?: NOT HARD AT ALL

## 2024-02-01 SDOH — ECONOMIC STABILITY: FOOD INSECURITY: WITHIN THE PAST 12 MONTHS, THE FOOD YOU BOUGHT JUST DIDN'T LAST AND YOU DIDN'T HAVE MONEY TO GET MORE.: NEVER TRUE

## 2024-02-01 SDOH — ECONOMIC STABILITY: HOUSING INSECURITY
IN THE LAST 12 MONTHS, WAS THERE A TIME WHEN YOU DID NOT HAVE A STEADY PLACE TO SLEEP OR SLEPT IN A SHELTER (INCLUDING NOW)?: NO

## 2024-02-01 ASSESSMENT — PATIENT HEALTH QUESTIONNAIRE - PHQ9
SUM OF ALL RESPONSES TO PHQ QUESTIONS 1-9: 3
6. FEELING BAD ABOUT YOURSELF - OR THAT YOU ARE A FAILURE OR HAVE LET YOURSELF OR YOUR FAMILY DOWN: 1
SUM OF ALL RESPONSES TO PHQ QUESTIONS 1-9: 3
7. TROUBLE CONCENTRATING ON THINGS, SUCH AS READING THE NEWSPAPER OR WATCHING TELEVISION: 0
2. FEELING DOWN, DEPRESSED OR HOPELESS: 1
8. MOVING OR SPEAKING SO SLOWLY THAT OTHER PEOPLE COULD HAVE NOTICED. OR THE OPPOSITE, BEING SO FIGETY OR RESTLESS THAT YOU HAVE BEEN MOVING AROUND A LOT MORE THAN USUAL: 0
SUM OF ALL RESPONSES TO PHQ QUESTIONS 1-9: 3
10. IF YOU CHECKED OFF ANY PROBLEMS, HOW DIFFICULT HAVE THESE PROBLEMS MADE IT FOR YOU TO DO YOUR WORK, TAKE CARE OF THINGS AT HOME, OR GET ALONG WITH OTHER PEOPLE: 0
3. TROUBLE FALLING OR STAYING ASLEEP: 0
9. THOUGHTS THAT YOU WOULD BE BETTER OFF DEAD, OR OF HURTING YOURSELF: 0
SUM OF ALL RESPONSES TO PHQ9 QUESTIONS 1 & 2: 1
1. LITTLE INTEREST OR PLEASURE IN DOING THINGS: 0
5. POOR APPETITE OR OVEREATING: 0
SUM OF ALL RESPONSES TO PHQ QUESTIONS 1-9: 3
4. FEELING TIRED OR HAVING LITTLE ENERGY: 1

## 2024-02-01 NOTE — PATIENT INSTRUCTIONS
Personalized Preventive Plan for Martina Edwards - 2/1/2024  Medicare offers a range of preventive health benefits. Some of the tests and screenings are paid in full while other may be subject to a deductible, co-insurance, and/or copay.    Some of these benefits include a comprehensive review of your medical history including lifestyle, illnesses that may run in your family, and various assessments and screenings as appropriate.    After reviewing your medical record and screening and assessments performed today your provider may have ordered immunizations, labs, imaging, and/or referrals for you.  A list of these orders (if applicable) as well as your Preventive Care list are included within your After Visit Summary for your review.    Other Preventive Recommendations:    A preventive eye exam performed by an eye specialist is recommended every 1-2 years to screen for glaucoma; cataracts, macular degeneration, and other eye disorders.  A preventive dental visit is recommended every 6 months.  Try to get at least 150 minutes of exercise per week or 10,000 steps per day on a pedometer .  Order or download the FREE \"Exercise & Physical Activity: Your Everyday Guide\" from [employer] [place] [employer]. Call 1-888.900.2292 or search [employer] [place] [employer] online.  You need 9250-3044 mg of calcium and 0746-0106 IU of vitamin D per day. It is possible to meet your calcium requirement with diet alone, but a vitamin D supplement is usually necessary to meet this goal.  When exposed to the sun, use a sunscreen that protects against both UVA and UVB radiation with an SPF of 30 or greater. Reapply every 2 to 3 hours or after sweating, drying off with a towel, or swimming.  Always wear a seat belt when traveling in a car. Always wear a helmet when riding a bicycle or motorcycle.

## 2024-02-01 NOTE — PROGRESS NOTES
Medicare Annual Wellness Visit    Martina Edwards is here for Medicare AWV    Assessment & Plan   Medicare annual wellness visit, subsequent  Recommendations for Preventive Services Due: see orders and patient instructions/AVS.  Recommended screening schedule for the next 5-10 years is provided to the patient in written form: see Patient Instructions/AVS.     No follow-ups on file.     Subjective       Patient's complete Health Risk Assessment and screening values have been reviewed and are found in Flowsheets. The following problems were reviewed today and where indicated follow up appointments were made and/or referrals ordered.    Positive Risk Factor Screenings with Interventions:                Activity, Diet, and Weight:  On average, how many days per week do you engage in moderate to strenuous exercise (like a brisk walk)?: 0 days  On average, how many minutes do you engage in exercise at this level?: 0 min    Do you eat balanced/healthy meals regularly?: Yes    There is no height or weight on file to calculate BMI. (!) Abnormal      Inactivity Interventions:  Patient declined any further interventions or treatment  Obesity Interventions:  Patient declines any further evaluation or treatment              Vision Screen:  Do you have difficulty driving, watching TV, or doing any of your daily activities because of your eyesight?: No  Have you had an eye exam within the past year?: (!) No (needs)  No results found.    Interventions:   Patient comments: states she does need to make an eye appointment.  Patient encouraged to make appointment with their eye specialist      Advanced Directives:  Do you have a Living Will?: (!) No (has forms)    Intervention:  has NO advanced directive - information provided-states she does have the forms, just needs to complete them. Requested she bring the notarized living will with her to her next office visit so we can scan them into her chart.                     Objective

## 2024-02-14 ENCOUNTER — HOSPITAL ENCOUNTER (OUTPATIENT)
Dept: CT IMAGING | Age: 79
Discharge: HOME | End: 2024-02-14
Payer: MEDICARE

## 2024-02-14 DIAGNOSIS — Z85.038 PERSONAL HISTORY OF COLON CANCER: ICD-10-CM

## 2024-02-14 PROCEDURE — 71250 CT THORAX DX C-: CPT

## 2024-02-14 PROCEDURE — 74176 CT ABD & PELVIS W/O CONTRAST: CPT

## 2024-03-31 DIAGNOSIS — K21.9 GASTROESOPHAGEAL REFLUX DISEASE, UNSPECIFIED WHETHER ESOPHAGITIS PRESENT: ICD-10-CM

## 2024-04-01 RX ORDER — FAMOTIDINE 20 MG/1
TABLET, FILM COATED ORAL
Qty: 200 TABLET | Refills: 2 | Status: SHIPPED | OUTPATIENT
Start: 2024-04-01

## 2024-04-02 ENCOUNTER — HOSPITAL ENCOUNTER (OUTPATIENT)
Dept: INFUSION THERAPY | Age: 79
Discharge: HOME | End: 2024-04-02
Payer: MEDICARE

## 2024-04-02 DIAGNOSIS — C50.912 INVASIVE DUCTAL CARCINOMA OF LEFT BREAST (HCC): Primary | ICD-10-CM

## 2024-04-02 PROCEDURE — 6360000002 HC RX W HCPCS

## 2024-04-02 PROCEDURE — 2580000003 HC RX 258: Performed by: INTERNAL MEDICINE

## 2024-04-02 PROCEDURE — 96523 IRRIG DRUG DELIVERY DEVICE: CPT

## 2024-04-02 RX ORDER — SODIUM CHLORIDE 9 MG/ML
25 INJECTION, SOLUTION INTRAVENOUS PRN
OUTPATIENT
Start: 2024-04-02

## 2024-04-02 RX ORDER — SODIUM CHLORIDE 0.9 % (FLUSH) 0.9 %
5-40 SYRINGE (ML) INJECTION PRN
Status: DISCONTINUED | OUTPATIENT
Start: 2024-04-02 | End: 2024-04-03 | Stop reason: HOSPADM

## 2024-04-02 RX ORDER — HEPARIN 100 UNIT/ML
SYRINGE INTRAVENOUS
Status: COMPLETED
Start: 2024-04-02 | End: 2024-04-02

## 2024-04-02 RX ORDER — HEPARIN 100 UNIT/ML
500 SYRINGE INTRAVENOUS PRN
Status: DISCONTINUED | OUTPATIENT
Start: 2024-04-02 | End: 2024-04-03 | Stop reason: HOSPADM

## 2024-04-02 RX ORDER — HEPARIN 100 UNIT/ML
500 SYRINGE INTRAVENOUS PRN
OUTPATIENT
Start: 2024-04-02

## 2024-04-02 RX ORDER — SODIUM CHLORIDE 0.9 % (FLUSH) 0.9 %
5-40 SYRINGE (ML) INJECTION PRN
OUTPATIENT
Start: 2024-04-02

## 2024-04-02 RX ADMIN — HEPARIN 500 UNITS: 100 SYRINGE at 10:07

## 2024-04-02 RX ADMIN — SODIUM CHLORIDE, PRESERVATIVE FREE 10 ML: 5 INJECTION INTRAVENOUS at 10:07

## 2024-04-02 NOTE — PROGRESS NOTES
Arrived for port flush.  Mediport accessed per protocol.  Positive blood return noted.  Flushed and locked with normal saline and heparin.  De accessed, band aid applied at access site.  AVS provided.  Discharge ambulatory in stable condition. Lisa Matthew RN

## 2024-05-20 ENCOUNTER — OFFICE VISIT (OUTPATIENT)
Dept: FAMILY MEDICINE CLINIC | Age: 79
End: 2024-05-20
Payer: MEDICARE

## 2024-05-20 VITALS
HEIGHT: 60 IN | WEIGHT: 187 LBS | DIASTOLIC BLOOD PRESSURE: 76 MMHG | BODY MASS INDEX: 36.71 KG/M2 | HEART RATE: 65 BPM | OXYGEN SATURATION: 96 % | SYSTOLIC BLOOD PRESSURE: 146 MMHG

## 2024-05-20 DIAGNOSIS — D50.8 OTHER IRON DEFICIENCY ANEMIA: ICD-10-CM

## 2024-05-20 DIAGNOSIS — I10 ESSENTIAL HYPERTENSION: ICD-10-CM

## 2024-05-20 DIAGNOSIS — C18.9 MALIGNANT NEOPLASM OF COLON, UNSPECIFIED PART OF COLON (HCC): ICD-10-CM

## 2024-05-20 DIAGNOSIS — E78.2 MODERATE MIXED HYPERLIPIDEMIA NOT REQUIRING STATIN THERAPY: ICD-10-CM

## 2024-05-20 DIAGNOSIS — Z78.0 POST-MENOPAUSAL: Primary | ICD-10-CM

## 2024-05-20 DIAGNOSIS — N18.32 STAGE 3B CHRONIC KIDNEY DISEASE (HCC): ICD-10-CM

## 2024-05-20 LAB
BASOPHILS # BLD: 0 K/UL (ref 0–0.2)
BASOPHILS NFR BLD: 0.7 %
CHOLEST SERPL-MCNC: 191 MG/DL (ref 0–199)
DEPRECATED RDW RBC AUTO: 13.2 % (ref 12.4–15.4)
EOSINOPHIL # BLD: 0.1 K/UL (ref 0–0.6)
EOSINOPHIL NFR BLD: 3.1 %
FERRITIN SERPL IA-MCNC: 26.9 NG/ML (ref 15–150)
HCT VFR BLD AUTO: 36.5 % (ref 36–48)
HDLC SERPL-MCNC: 49 MG/DL (ref 40–60)
HGB BLD-MCNC: 12 G/DL (ref 12–16)
IRON SATN MFR SERPL: 15 % (ref 15–50)
IRON SERPL-MCNC: 46 UG/DL (ref 37–145)
LDL CHOLESTEROL: 114 MG/DL
LYMPHOCYTES # BLD: 1 K/UL (ref 1–5.1)
LYMPHOCYTES NFR BLD: 24.2 %
MCH RBC QN AUTO: 29.5 PG (ref 26–34)
MCHC RBC AUTO-ENTMCNC: 33 G/DL (ref 31–36)
MCV RBC AUTO: 89.2 FL (ref 80–100)
MONOCYTES # BLD: 0.4 K/UL (ref 0–1.3)
MONOCYTES NFR BLD: 8.5 %
NEUTROPHILS # BLD: 2.6 K/UL (ref 1.7–7.7)
NEUTROPHILS NFR BLD: 63.5 %
PLATELET # BLD AUTO: 200 K/UL (ref 135–450)
PMV BLD AUTO: 8.9 FL (ref 5–10.5)
RBC # BLD AUTO: 4.09 M/UL (ref 4–5.2)
TIBC SERPL-MCNC: 300 UG/DL (ref 260–445)
TRIGL SERPL-MCNC: 139 MG/DL (ref 0–150)
VLDLC SERPL CALC-MCNC: 28 MG/DL
WBC # BLD AUTO: 4.1 K/UL (ref 4–11)

## 2024-05-20 PROCEDURE — 1124F ACP DISCUSS-NO DSCNMKR DOCD: CPT | Performed by: STUDENT IN AN ORGANIZED HEALTH CARE EDUCATION/TRAINING PROGRAM

## 2024-05-20 PROCEDURE — 99214 OFFICE O/P EST MOD 30 MIN: CPT | Performed by: STUDENT IN AN ORGANIZED HEALTH CARE EDUCATION/TRAINING PROGRAM

## 2024-05-20 PROCEDURE — 3077F SYST BP >= 140 MM HG: CPT | Performed by: STUDENT IN AN ORGANIZED HEALTH CARE EDUCATION/TRAINING PROGRAM

## 2024-05-20 PROCEDURE — 3078F DIAST BP <80 MM HG: CPT | Performed by: STUDENT IN AN ORGANIZED HEALTH CARE EDUCATION/TRAINING PROGRAM

## 2024-05-20 NOTE — PROGRESS NOTES
5/29/2024    Martina Edwards    Chief Complaint   Patient presents with    6 Month Follow-Up     - right medial knee pain. on 5/17/24 pt's adult grandson \"passed out\" in her bathroom. Pt was pushing on the bathroom door , grandson laying against the door. Pt twisted right leg straining medial right knee. Pt tried wrapping knee with a ace bandage, elevation & ice, tylenol - helping some       HPI  History was obtained from patient.  Martina is a 78 y.o. female with a PMHx as listed below who presents today for 6 month follow up. No acute complaints.     Colonoscopy/EGD last January ulcer found s/p clip placement. Hiatal hernia,     PAPA: On iron supplementation, admits some constipation. Bms almost every other day. She is using stool softners prn     Bp high today she     Followed with Dr. Pedroza in the past Hx. Signoid ca.   Recent mammo nl.     1. Post-menopausal    2. Malignant neoplasm of colon, unspecified part of colon (HCC)    3. Essential hypertension    4. Other iron deficiency anemia    5. Stage 3b chronic kidney disease (HCC)    6. Moderate mixed hyperlipidemia not requiring statin therapy             REVIEW OF SYMPTOMS    Review of Systems   Constitutional:  Negative for chills and fatigue.   HENT:  Negative for congestion and sore throat.    Respiratory:  Negative for shortness of breath and wheezing.    Cardiovascular:  Negative for chest pain and palpitations.   Gastrointestinal:  Negative for abdominal pain and nausea.   Genitourinary:  Negative for frequency and urgency.   Neurological:  Negative for light-headedness.       PAST MEDICAL HISTORY  Past Medical History:   Diagnosis Date    Abnormal EKG      see ekg report-11/4/2016-\"have appt 11/18/2016 to see Dr Baker to get heart clearance for surgery - 11/21/2016\"    Anemia     Breast CA (HCC)     left    Chemotherapy induced diarrhea     Chronic kidney disease, stage 3 (HCC)     Colon Cancer Dx 1-8-19    Colonoscopy, tx surg- following with Dr Black to

## 2024-05-29 ASSESSMENT — ENCOUNTER SYMPTOMS
WHEEZING: 0
SHORTNESS OF BREATH: 0
NAUSEA: 0
SORE THROAT: 0
ABDOMINAL PAIN: 0

## 2024-05-30 NOTE — RESULT ENCOUNTER NOTE
Martina's labs are normal. You has some mild iron deficiency otherwise good blood count. Continue iron supplementation daily

## 2024-06-18 ENCOUNTER — HOSPITAL ENCOUNTER (OUTPATIENT)
Dept: WOMENS IMAGING | Age: 79
Discharge: HOME | End: 2024-06-18
Attending: STUDENT IN AN ORGANIZED HEALTH CARE EDUCATION/TRAINING PROGRAM
Payer: MEDICARE

## 2024-06-18 DIAGNOSIS — Z78.0 POST-MENOPAUSAL: ICD-10-CM

## 2024-06-18 PROCEDURE — 77080 DXA BONE DENSITY AXIAL: CPT

## 2024-06-21 DIAGNOSIS — E78.49 OTHER HYPERLIPIDEMIA: ICD-10-CM

## 2024-06-21 DIAGNOSIS — F32.5 MAJOR DEPRESSIVE DISORDER WITH SINGLE EPISODE, IN FULL REMISSION (HCC): ICD-10-CM

## 2024-06-21 RX ORDER — ATORVASTATIN CALCIUM 40 MG/1
TABLET, FILM COATED ORAL
Qty: 100 TABLET | Refills: 1 | Status: SHIPPED | OUTPATIENT
Start: 2024-06-21

## 2024-06-21 RX ORDER — SERTRALINE HYDROCHLORIDE 100 MG/1
TABLET, FILM COATED ORAL
Qty: 135 TABLET | Refills: 3 | Status: SHIPPED | OUTPATIENT
Start: 2024-06-21

## 2024-06-26 DIAGNOSIS — M81.6 LOCALIZED OSTEOPOROSIS, UNSPECIFIED PATHOLOGICAL FRACTURE PRESENCE: Primary | ICD-10-CM

## 2024-07-02 DIAGNOSIS — M81.6 LOCALIZED OSTEOPOROSIS, UNSPECIFIED PATHOLOGICAL FRACTURE PRESENCE: ICD-10-CM

## 2024-07-02 LAB
PTH-INTACT SERPL-MCNC: 127.5 PG/ML (ref 14–72)
TSH SERPL DL<=0.005 MIU/L-ACNC: 1.96 UIU/ML (ref 0.27–4.2)

## 2024-07-03 LAB — 25(OH)D3 SERPL-MCNC: 19.9 NG/ML

## 2024-07-09 NOTE — RESULT ENCOUNTER NOTE
Martina's Vit D very low recommend we start boost dose 50,000 IU vit D once weekly for 12 weeks followed by 2000IU daily. Thyroid levels normal

## 2024-07-10 DIAGNOSIS — M81.0 AGE-RELATED OSTEOPOROSIS WITHOUT CURRENT PATHOLOGICAL FRACTURE: Primary | ICD-10-CM

## 2024-07-10 PROBLEM — N30.00 ACUTE CYSTITIS WITHOUT HEMATURIA: Status: ACTIVE | Noted: 2024-07-10

## 2024-07-10 PROBLEM — E21.3 HPTH (HYPERPARATHYROIDISM) (HCC): Status: ACTIVE | Noted: 2024-07-10

## 2024-07-10 RX ORDER — FAMOTIDINE 10 MG/ML
20 INJECTION, SOLUTION INTRAVENOUS
OUTPATIENT
Start: 2024-07-17

## 2024-07-10 RX ORDER — SODIUM CHLORIDE 9 MG/ML
INJECTION, SOLUTION INTRAVENOUS CONTINUOUS
OUTPATIENT
Start: 2024-07-17

## 2024-07-10 RX ORDER — ALBUTEROL SULFATE 90 UG/1
4 AEROSOL, METERED RESPIRATORY (INHALATION) PRN
OUTPATIENT
Start: 2024-07-17

## 2024-07-10 RX ORDER — DIPHENHYDRAMINE HYDROCHLORIDE 50 MG/ML
50 INJECTION INTRAMUSCULAR; INTRAVENOUS
OUTPATIENT
Start: 2024-07-17

## 2024-07-10 RX ORDER — EPINEPHRINE 1 MG/ML
0.3 INJECTION, SOLUTION INTRAMUSCULAR; SUBCUTANEOUS PRN
OUTPATIENT
Start: 2024-07-17

## 2024-07-10 RX ORDER — ACETAMINOPHEN 325 MG/1
650 TABLET ORAL
OUTPATIENT
Start: 2024-07-17

## 2024-07-10 RX ORDER — ONDANSETRON 2 MG/ML
8 INJECTION INTRAMUSCULAR; INTRAVENOUS
OUTPATIENT
Start: 2024-07-17

## 2024-07-12 RX ORDER — ERGOCALCIFEROL 1.25 MG/1
50000 CAPSULE ORAL WEEKLY
Qty: 12 CAPSULE | Refills: 0 | Status: SHIPPED | OUTPATIENT
Start: 2024-07-12

## 2024-09-25 ENCOUNTER — HOSPITAL ENCOUNTER (OUTPATIENT)
Dept: INFUSION THERAPY | Age: 79
Setting detail: INFUSION SERIES
Discharge: HOME | End: 2024-09-25
Payer: MEDICARE

## 2024-09-25 VITALS
DIASTOLIC BLOOD PRESSURE: 77 MMHG | HEART RATE: 68 BPM | OXYGEN SATURATION: 95 % | RESPIRATION RATE: 16 BRPM | SYSTOLIC BLOOD PRESSURE: 145 MMHG

## 2024-09-25 DIAGNOSIS — M81.0 AGE-RELATED OSTEOPOROSIS WITHOUT CURRENT PATHOLOGICAL FRACTURE: Primary | ICD-10-CM

## 2024-09-25 PROCEDURE — 6360000002 HC RX W HCPCS: Performed by: STUDENT IN AN ORGANIZED HEALTH CARE EDUCATION/TRAINING PROGRAM

## 2024-09-25 PROCEDURE — 96372 THER/PROPH/DIAG INJ SC/IM: CPT

## 2024-09-25 RX ORDER — ALBUTEROL SULFATE 90 UG/1
4 INHALANT RESPIRATORY (INHALATION) PRN
Status: CANCELLED | OUTPATIENT
Start: 2025-03-26

## 2024-09-25 RX ORDER — DIPHENHYDRAMINE HYDROCHLORIDE 50 MG/ML
50 INJECTION INTRAMUSCULAR; INTRAVENOUS
Status: CANCELLED | OUTPATIENT
Start: 2025-03-26

## 2024-09-25 RX ORDER — FAMOTIDINE 10 MG/ML
20 INJECTION, SOLUTION INTRAVENOUS
Status: CANCELLED | OUTPATIENT
Start: 2025-03-26

## 2024-09-25 RX ORDER — SODIUM CHLORIDE 9 MG/ML
INJECTION, SOLUTION INTRAVENOUS CONTINUOUS
Status: CANCELLED | OUTPATIENT
Start: 2025-03-26

## 2024-09-25 RX ORDER — EPINEPHRINE 1 MG/ML
0.3 INJECTION, SOLUTION INTRAMUSCULAR; SUBCUTANEOUS PRN
Status: CANCELLED | OUTPATIENT
Start: 2025-03-26

## 2024-09-25 RX ORDER — ACETAMINOPHEN 325 MG/1
650 TABLET ORAL
Status: CANCELLED | OUTPATIENT
Start: 2025-03-26

## 2024-09-25 RX ORDER — ONDANSETRON 2 MG/ML
8 INJECTION INTRAMUSCULAR; INTRAVENOUS
Status: CANCELLED | OUTPATIENT
Start: 2025-03-26

## 2024-09-25 RX ADMIN — DENOSUMAB 60 MG: 60 INJECTION SUBCUTANEOUS at 08:09

## 2024-11-21 ENCOUNTER — OFFICE VISIT (OUTPATIENT)
Dept: FAMILY MEDICINE CLINIC | Age: 79
End: 2024-11-21

## 2024-11-21 VITALS
DIASTOLIC BLOOD PRESSURE: 70 MMHG | BODY MASS INDEX: 36.67 KG/M2 | HEIGHT: 60 IN | RESPIRATION RATE: 16 BRPM | HEART RATE: 90 BPM | WEIGHT: 186.8 LBS | OXYGEN SATURATION: 92 % | SYSTOLIC BLOOD PRESSURE: 126 MMHG

## 2024-11-21 DIAGNOSIS — I10 ESSENTIAL HYPERTENSION: ICD-10-CM

## 2024-11-21 DIAGNOSIS — Z23 FLU VACCINE NEED: Primary | ICD-10-CM

## 2024-11-21 DIAGNOSIS — R11.0 NAUSEA: ICD-10-CM

## 2024-11-21 DIAGNOSIS — E61.1 IRON DEFICIENCY: ICD-10-CM

## 2024-11-21 DIAGNOSIS — R73.01 IMPAIRED FASTING GLUCOSE: ICD-10-CM

## 2024-11-21 DIAGNOSIS — K59.00 CONSTIPATION, UNSPECIFIED CONSTIPATION TYPE: ICD-10-CM

## 2024-11-21 DIAGNOSIS — E78.49 OTHER HYPERLIPIDEMIA: ICD-10-CM

## 2024-11-21 DIAGNOSIS — F32.5 MAJOR DEPRESSIVE DISORDER WITH SINGLE EPISODE, IN FULL REMISSION (HCC): Chronic | ICD-10-CM

## 2024-11-21 LAB — HBA1C MFR BLD: 6.1 %

## 2024-11-21 RX ORDER — ONDANSETRON 8 MG/1
TABLET, FILM COATED ORAL
Qty: 90 TABLET | Refills: 1 | Status: SHIPPED | OUTPATIENT
Start: 2024-11-21

## 2024-11-21 RX ORDER — LOPERAMIDE HYDROCHLORIDE 2 MG/1
2 CAPSULE ORAL 4 TIMES DAILY PRN
Qty: 360 CAPSULE | Refills: 1 | Status: SHIPPED | OUTPATIENT
Start: 2024-11-21

## 2024-11-21 NOTE — PROGRESS NOTES
12/9/2024    Martina Edwards    Chief Complaint   Patient presents with    6 Month Follow-Up     6 month    Flu Vaccine     Agreeable to flu vaccine.     lymphedema     Lymphedema left leg. Dr. Flood is treating.        HPI    History of Present Illness  The patient is a 78-year-old female here for her 6-month follow-up on her chronic conditions, including acid reflux.    She is currently under the care of Dr. Flood for lymphedema, which is being managed with physical therapy at [facility].    She has a history of colon cancer and is being monitored by Dr. RUSSO through regular colonoscopies, the most recent of which was in 01/2024.    She experiences joint pain, which she attributes to her chemotherapy medication and statin. She is seeking alternatives to Tylenol for pain management.    Her mood is stable on sertraline.    She reports that her phosphorus levels were slightly low during her last lab work. She also mentions that her glucose levels have consistently been on the higher side.      1. Flu vaccine need    2. Nausea    3. Constipation, unspecified constipation type    4. Major depressive disorder with single episode, in full remission (HCC)    5. Impaired fasting glucose    6. Essential hypertension    7. Iron deficiency    8. Other hyperlipidemia             REVIEW OF SYMPTOMS    Review of Systems   Constitutional:  Negative for chills and fatigue.   HENT:  Negative for congestion and sore throat.    Respiratory:  Negative for shortness of breath and wheezing.    Cardiovascular:  Negative for chest pain and palpitations.   Gastrointestinal:  Negative for abdominal pain and nausea.   Genitourinary:  Negative for frequency and urgency.   Neurological:  Negative for light-headedness.       PAST MEDICAL HISTORY  Past Medical History:   Diagnosis Date    Abnormal EKG      see ekg report-11/4/2016-\"have appt 11/18/2016 to see Dr Baker to get heart clearance for surgery - 11/21/2016\"    Anemia     Breast CA

## 2024-11-28 DIAGNOSIS — K21.9 GASTROESOPHAGEAL REFLUX DISEASE, UNSPECIFIED WHETHER ESOPHAGITIS PRESENT: ICD-10-CM

## 2024-11-28 DIAGNOSIS — E78.49 OTHER HYPERLIPIDEMIA: ICD-10-CM

## 2024-12-02 RX ORDER — FAMOTIDINE 20 MG/1
TABLET, FILM COATED ORAL
Qty: 200 TABLET | Refills: 2 | Status: SHIPPED | OUTPATIENT
Start: 2024-12-02

## 2024-12-02 RX ORDER — ATORVASTATIN CALCIUM 40 MG/1
TABLET, FILM COATED ORAL
Qty: 100 TABLET | Refills: 2 | Status: SHIPPED | OUTPATIENT
Start: 2024-12-02

## 2024-12-09 ASSESSMENT — ENCOUNTER SYMPTOMS
WHEEZING: 0
ABDOMINAL PAIN: 0
NAUSEA: 0
SORE THROAT: 0
SHORTNESS OF BREATH: 0

## 2025-02-06 ENCOUNTER — TELEMEDICINE (OUTPATIENT)
Dept: FAMILY MEDICINE CLINIC | Age: 80
End: 2025-02-06

## 2025-02-06 DIAGNOSIS — Z00.00 MEDICARE ANNUAL WELLNESS VISIT, SUBSEQUENT: Primary | ICD-10-CM

## 2025-02-06 SDOH — ECONOMIC STABILITY: FOOD INSECURITY: WITHIN THE PAST 12 MONTHS, THE FOOD YOU BOUGHT JUST DIDN'T LAST AND YOU DIDN'T HAVE MONEY TO GET MORE.: NEVER TRUE

## 2025-02-06 SDOH — ECONOMIC STABILITY: FOOD INSECURITY: WITHIN THE PAST 12 MONTHS, YOU WORRIED THAT YOUR FOOD WOULD RUN OUT BEFORE YOU GOT MONEY TO BUY MORE.: NEVER TRUE

## 2025-02-06 ASSESSMENT — PATIENT HEALTH QUESTIONNAIRE - PHQ9
9. THOUGHTS THAT YOU WOULD BE BETTER OFF DEAD, OR OF HURTING YOURSELF: NOT AT ALL
7. TROUBLE CONCENTRATING ON THINGS, SUCH AS READING THE NEWSPAPER OR WATCHING TELEVISION: NOT AT ALL
2. FEELING DOWN, DEPRESSED OR HOPELESS: NOT AT ALL
SUM OF ALL RESPONSES TO PHQ QUESTIONS 1-9: 1
5. POOR APPETITE OR OVEREATING: NOT AT ALL
3. TROUBLE FALLING OR STAYING ASLEEP: SEVERAL DAYS
8. MOVING OR SPEAKING SO SLOWLY THAT OTHER PEOPLE COULD HAVE NOTICED. OR THE OPPOSITE, BEING SO FIGETY OR RESTLESS THAT YOU HAVE BEEN MOVING AROUND A LOT MORE THAN USUAL: NOT AT ALL
4. FEELING TIRED OR HAVING LITTLE ENERGY: NOT AT ALL
SUM OF ALL RESPONSES TO PHQ QUESTIONS 1-9: 1
SUM OF ALL RESPONSES TO PHQ QUESTIONS 1-9: 1
6. FEELING BAD ABOUT YOURSELF - OR THAT YOU ARE A FAILURE OR HAVE LET YOURSELF OR YOUR FAMILY DOWN: NOT AT ALL
SUM OF ALL RESPONSES TO PHQ QUESTIONS 1-9: 1
1. LITTLE INTEREST OR PLEASURE IN DOING THINGS: NOT AT ALL
10. IF YOU CHECKED OFF ANY PROBLEMS, HOW DIFFICULT HAVE THESE PROBLEMS MADE IT FOR YOU TO DO YOUR WORK, TAKE CARE OF THINGS AT HOME, OR GET ALONG WITH OTHER PEOPLE: NOT DIFFICULT AT ALL
SUM OF ALL RESPONSES TO PHQ9 QUESTIONS 1 & 2: 0

## 2025-02-06 NOTE — PATIENT INSTRUCTIONS
Personalized Preventive Plan for Martina Edwards - 2/6/2025  Medicare offers a range of preventive health benefits. Some of the tests and screenings are paid in full while other may be subject to a deductible, co-insurance, and/or copay.  Some of these benefits include a comprehensive review of your medical history including lifestyle, illnesses that may run in your family, and various assessments and screenings as appropriate.  After reviewing your medical record and screening and assessments performed today your provider may have ordered immunizations, labs, imaging, and/or referrals for you.  A list of these orders (if applicable) as well as your Preventive Care list are included within your After Visit Summary for your review.

## 2025-02-06 NOTE — PROGRESS NOTES
Medicare Annual Wellness Visit    Martina Edwards is here for Medicare AWV    Assessment & Plan   Medicare annual wellness visit, subsequent     No follow-ups on file.     Subjective       Patient's complete Health Risk Assessment and screening values have been reviewed and are found in Flowsheets. The following problems were reviewed today and where indicated follow up appointments were made and/or referrals ordered.    Positive Risk Factor Screenings with Interventions:    Fall Risk:  Do you feel unsteady or are you worried about falling? : (!) yes (on uneven ground, cane prn)  2 or more falls in past year?: no  Fall with injury in past year?: no     Interventions:    Patient comments: patient states she does feel unsteady when on uneven ground. She says she has a cane she will use for stability as needed.  Reviewed medications, home hazards, visual acuity, and co-morbidities that can increase risk for falls  Patient declines any further evaluation or treatment             Inactivity:  On average, how many days per week do you engage in moderate to strenuous exercise (like a brisk walk)?: 0 days (!) Abnormal  On average, how many minutes do you engage in exercise at this level?: 0 min  Interventions:  Patient declined any further interventions or treatment    Poor Eating Habits/Diet:  Do you eat balanced/healthy meals regularly?: (!) No  Interventions:  Patient declines any further evaluation or treatment    Abnormal BMI (obese):  There is no height or weight on file to calculate BMI. (!) Abnormal  Interventions:  Patient declines any further evaluation or treatment          Vision Screen:  Do you have difficulty driving, watching TV, or doing any of your daily activities because of your eyesight?: No  Have you had an eye exam within the past year?: (!) No (needs this year)  Interventions:   Patient comments: states she does need to have an eye exam this year.  Patient encouraged to make appointment with their

## 2025-02-26 RX ORDER — EPINEPHRINE 1 MG/ML
0.3 INJECTION, SOLUTION INTRAMUSCULAR; SUBCUTANEOUS PRN
OUTPATIENT
Start: 2025-03-26

## 2025-02-26 RX ORDER — SODIUM CHLORIDE 9 MG/ML
INJECTION, SOLUTION INTRAVENOUS CONTINUOUS
OUTPATIENT
Start: 2025-03-26

## 2025-02-26 RX ORDER — HYDROCORTISONE SODIUM SUCCINATE 100 MG/2ML
100 INJECTION INTRAMUSCULAR; INTRAVENOUS
OUTPATIENT
Start: 2025-03-26

## 2025-02-26 RX ORDER — FAMOTIDINE 10 MG/ML
20 INJECTION, SOLUTION INTRAVENOUS
OUTPATIENT
Start: 2025-03-26

## 2025-02-26 RX ORDER — ACETAMINOPHEN 325 MG/1
650 TABLET ORAL
OUTPATIENT
Start: 2025-03-26

## 2025-02-26 RX ORDER — ALBUTEROL SULFATE 90 UG/1
4 INHALANT RESPIRATORY (INHALATION) PRN
OUTPATIENT
Start: 2025-03-26

## 2025-02-26 RX ORDER — DIPHENHYDRAMINE HYDROCHLORIDE 50 MG/ML
50 INJECTION INTRAMUSCULAR; INTRAVENOUS
OUTPATIENT
Start: 2025-03-26

## 2025-02-26 RX ORDER — ONDANSETRON 2 MG/ML
8 INJECTION INTRAMUSCULAR; INTRAVENOUS
OUTPATIENT
Start: 2025-03-26

## 2025-03-26 ENCOUNTER — HOSPITAL ENCOUNTER (OUTPATIENT)
Dept: INFUSION THERAPY | Age: 80
Setting detail: INFUSION SERIES
Discharge: HOME | End: 2025-03-26
Payer: MEDICARE

## 2025-03-26 VITALS
TEMPERATURE: 97.3 F | HEART RATE: 70 BPM | SYSTOLIC BLOOD PRESSURE: 158 MMHG | RESPIRATION RATE: 16 BRPM | DIASTOLIC BLOOD PRESSURE: 81 MMHG | OXYGEN SATURATION: 96 %

## 2025-03-26 DIAGNOSIS — M81.0 AGE-RELATED OSTEOPOROSIS WITHOUT CURRENT PATHOLOGICAL FRACTURE: Primary | ICD-10-CM

## 2025-03-26 PROCEDURE — 6360000002 HC RX W HCPCS: Performed by: STUDENT IN AN ORGANIZED HEALTH CARE EDUCATION/TRAINING PROGRAM

## 2025-03-26 PROCEDURE — 96372 THER/PROPH/DIAG INJ SC/IM: CPT

## 2025-03-26 RX ORDER — ACETAMINOPHEN 325 MG/1
650 TABLET ORAL
Status: CANCELLED | OUTPATIENT
Start: 2025-09-24

## 2025-03-26 RX ORDER — SODIUM CHLORIDE 9 MG/ML
INJECTION, SOLUTION INTRAVENOUS CONTINUOUS
Status: CANCELLED | OUTPATIENT
Start: 2025-09-24

## 2025-03-26 RX ORDER — ALBUTEROL SULFATE 90 UG/1
4 INHALANT RESPIRATORY (INHALATION) PRN
Status: CANCELLED | OUTPATIENT
Start: 2025-09-24

## 2025-03-26 RX ORDER — EPINEPHRINE 1 MG/ML
0.3 INJECTION, SOLUTION INTRAMUSCULAR; SUBCUTANEOUS PRN
Status: CANCELLED | OUTPATIENT
Start: 2025-09-24

## 2025-03-26 RX ORDER — HYDROCORTISONE SODIUM SUCCINATE 100 MG/2ML
100 INJECTION INTRAMUSCULAR; INTRAVENOUS
Status: CANCELLED | OUTPATIENT
Start: 2025-09-24

## 2025-03-26 RX ORDER — ONDANSETRON 2 MG/ML
8 INJECTION INTRAMUSCULAR; INTRAVENOUS
Status: CANCELLED | OUTPATIENT
Start: 2025-09-24

## 2025-03-26 RX ORDER — DIPHENHYDRAMINE HYDROCHLORIDE 50 MG/ML
50 INJECTION, SOLUTION INTRAMUSCULAR; INTRAVENOUS
Status: CANCELLED | OUTPATIENT
Start: 2025-09-24

## 2025-03-26 RX ORDER — FAMOTIDINE 10 MG/ML
20 INJECTION, SOLUTION INTRAVENOUS
Status: CANCELLED | OUTPATIENT
Start: 2025-09-24

## 2025-03-26 RX ADMIN — DENOSUMAB 60 MG: 60 INJECTION SUBCUTANEOUS at 08:47

## 2025-03-26 NOTE — PROGRESS NOTES
Prior to discharge, the After Visit Summary Discharge Instructions were reviewed with the patient.  She was offered a printed version of the AVS, but declined the offer. Recurrent appointment, pt tolerated infusion well. Pt discharged home. Pt to exit via steady gait.    Orders Placed This Encounter   Medications    denosumab (PROLIA) SC injection 60 mg

## 2025-05-13 DIAGNOSIS — I10 ESSENTIAL HYPERTENSION: ICD-10-CM

## 2025-05-13 DIAGNOSIS — E78.49 OTHER HYPERLIPIDEMIA: ICD-10-CM

## 2025-05-13 DIAGNOSIS — E61.1 IRON DEFICIENCY: ICD-10-CM

## 2025-05-13 LAB
ALBUMIN SERPL-MCNC: 4.1 G/DL (ref 3.4–5)
ALBUMIN/GLOB SERPL: 1.6 {RATIO} (ref 1.1–2.2)
ALP SERPL-CCNC: 63 U/L (ref 40–129)
ALT SERPL-CCNC: 11 U/L (ref 10–40)
ANION GAP SERPL CALCULATED.3IONS-SCNC: 9 MMOL/L (ref 3–16)
AST SERPL-CCNC: 17 U/L (ref 15–37)
BILIRUB SERPL-MCNC: 0.3 MG/DL (ref 0–1)
BUN SERPL-MCNC: 28 MG/DL (ref 7–20)
CALCIUM SERPL-MCNC: 9.8 MG/DL (ref 8.3–10.6)
CHLORIDE SERPL-SCNC: 107 MMOL/L (ref 99–110)
CHOLEST SERPL-MCNC: 214 MG/DL (ref 0–199)
CO2 SERPL-SCNC: 27 MMOL/L (ref 21–32)
CREAT SERPL-MCNC: 1.4 MG/DL (ref 0.6–1.2)
FERRITIN SERPL IA-MCNC: 24.3 NG/ML (ref 15–150)
GFR SERPLBLD CREATININE-BSD FMLA CKD-EPI: 38 ML/MIN/{1.73_M2}
GLUCOSE SERPL-MCNC: 137 MG/DL (ref 70–99)
HDLC SERPL-MCNC: 42 MG/DL (ref 40–60)
IRON SATN MFR SERPL: 15 % (ref 15–50)
IRON SERPL-MCNC: 48 UG/DL (ref 37–145)
LDL CHOLESTEROL: 137 MG/DL
POTASSIUM SERPL-SCNC: 4.8 MMOL/L (ref 3.5–5.1)
PROT SERPL-MCNC: 6.7 G/DL (ref 6.4–8.2)
SODIUM SERPL-SCNC: 143 MMOL/L (ref 136–145)
TIBC SERPL-MCNC: 322 UG/DL (ref 260–445)
TRIGL SERPL-MCNC: 177 MG/DL (ref 0–150)
VLDLC SERPL CALC-MCNC: 35 MG/DL

## 2025-05-19 ENCOUNTER — RESULTS FOLLOW-UP (OUTPATIENT)
Dept: FAMILY MEDICINE CLINIC | Age: 80
End: 2025-05-19

## 2025-05-20 NOTE — RESULT ENCOUNTER NOTE
Martina your lab shows some stable chronic kidney disease we will further discuss at your appointment.

## 2025-05-22 ENCOUNTER — OFFICE VISIT (OUTPATIENT)
Dept: FAMILY MEDICINE CLINIC | Age: 80
End: 2025-05-22
Payer: MEDICARE

## 2025-05-22 VITALS
HEIGHT: 60 IN | WEIGHT: 195.4 LBS | DIASTOLIC BLOOD PRESSURE: 72 MMHG | HEART RATE: 75 BPM | SYSTOLIC BLOOD PRESSURE: 138 MMHG | OXYGEN SATURATION: 96 % | BODY MASS INDEX: 38.36 KG/M2

## 2025-05-22 DIAGNOSIS — I10 ESSENTIAL HYPERTENSION: ICD-10-CM

## 2025-05-22 DIAGNOSIS — F32.5 MAJOR DEPRESSIVE DISORDER WITH SINGLE EPISODE, IN FULL REMISSION: Chronic | ICD-10-CM

## 2025-05-22 DIAGNOSIS — N18.4 CHRONIC KIDNEY DISEASE, STAGE IV (SEVERE) (HCC): ICD-10-CM

## 2025-05-22 DIAGNOSIS — L57.0 ACTINIC KERATOSIS: ICD-10-CM

## 2025-05-22 DIAGNOSIS — M81.6 LOCALIZED OSTEOPOROSIS WITHOUT CURRENT PATHOLOGICAL FRACTURE: Primary | ICD-10-CM

## 2025-05-22 PROCEDURE — 3078F DIAST BP <80 MM HG: CPT | Performed by: STUDENT IN AN ORGANIZED HEALTH CARE EDUCATION/TRAINING PROGRAM

## 2025-05-22 PROCEDURE — 1159F MED LIST DOCD IN RCRD: CPT | Performed by: STUDENT IN AN ORGANIZED HEALTH CARE EDUCATION/TRAINING PROGRAM

## 2025-05-22 PROCEDURE — 3075F SYST BP GE 130 - 139MM HG: CPT | Performed by: STUDENT IN AN ORGANIZED HEALTH CARE EDUCATION/TRAINING PROGRAM

## 2025-05-22 PROCEDURE — 1124F ACP DISCUSS-NO DSCNMKR DOCD: CPT | Performed by: STUDENT IN AN ORGANIZED HEALTH CARE EDUCATION/TRAINING PROGRAM

## 2025-05-22 PROCEDURE — 99214 OFFICE O/P EST MOD 30 MIN: CPT | Performed by: STUDENT IN AN ORGANIZED HEALTH CARE EDUCATION/TRAINING PROGRAM

## 2025-05-22 NOTE — PROGRESS NOTES
Assessment & Plan  1. Hypertension.  - Blood pressure readings are within the normal range today.  - Current medication regimen will be maintained.  - No additional concerns or changes discussed.    2. Chronic kidney disease.  - Kidney function remains stable at approximately 30-40%, with the normal range being around 60%.  - Advised to avoid ibuprofen, Aleve, and Excedrin, and to ensure adequate hydration.  - No new symptoms or changes in condition reported.    3. Hypercholesterolemia.  - Cholesterol levels are marginally elevated.  - Already on medication to help decrease cholesterol.  - Potential addition of a second medication discussed but declined due to concerns about muscle aches.    4. Osteoporosis.  - Severe osteoporosis in the left femur, increasing risk for fractures.  - Received two Prolia injections and reports increased muscle soreness.  - Scheduled for a third injection in September; informed she could discontinue Prolia if desired.    5. Anemia.  - Iron levels are slightly low, but blood count was not anemic as per the last check in 03/2025 by Dr. Bauman.  - Advised to take iron tablets at least three times a week.  - No new symptoms or changes in condition reported.    6. Prediabetes.  - Blood glucose level is 137, indicating a prediabetic state.  - Not considered a significant concern at this time.  - An A1c test may be conducted during the next visit.    7. Dermatology referral.  - Referral to Dr. Ann, a dermatologist, will be arranged.  - Patient expressed willingness to see a dermatologist.    Follow-up  - Follow-up appointment scheduled in 6 months.    Return in about 6 months (around 11/22/2025).         The patient (or guardian, if applicable) and other individuals in attendance with the patient were advised that Artificial Intelligence will be utilized during this visit to record, process the conversation to generate a clinical note, and support improvement of the AI technology. The

## 2025-08-07 DIAGNOSIS — K21.9 GASTROESOPHAGEAL REFLUX DISEASE, UNSPECIFIED WHETHER ESOPHAGITIS PRESENT: ICD-10-CM

## 2025-08-07 DIAGNOSIS — E78.49 OTHER HYPERLIPIDEMIA: ICD-10-CM

## 2025-08-08 RX ORDER — FAMOTIDINE 20 MG/1
20 TABLET, FILM COATED ORAL 2 TIMES DAILY
Qty: 200 TABLET | Refills: 2 | Status: SHIPPED | OUTPATIENT
Start: 2025-08-08

## 2025-08-08 RX ORDER — ATORVASTATIN CALCIUM 40 MG/1
40 TABLET, FILM COATED ORAL NIGHTLY
Qty: 100 TABLET | Refills: 2 | Status: SHIPPED | OUTPATIENT
Start: 2025-08-08

## 2025-08-28 ENCOUNTER — TELEPHONE (OUTPATIENT)
Dept: FAMILY MEDICINE CLINIC | Age: 80
End: 2025-08-28

## (undated) DEVICE — TUBING, SUCTION, 3/16" X 6', STRAIGHT: Brand: MEDLINE

## (undated) DEVICE — 1315 FOAM STRIP NEEDLE COUNTER: Brand: DEVON

## (undated) DEVICE — CONTAINER,SPECIMEN,OR STERILE,4OZ: Brand: MEDLINE

## (undated) DEVICE — STAPLER SKIN H3.9MM WIRE DIA0.58MM CRWN 6.9MM 35 STPL ROT

## (undated) DEVICE — SOLUTION IV IRRIG WATER 1000ML POUR BRL 2F7114

## (undated) DEVICE — GLOVE SURG SZ 65 L12IN FNGR THK87MIL WHT LTX FREE

## (undated) DEVICE — CORD ES L15FT PT RET REUSE VALLEYLAB REM

## (undated) DEVICE — FORCEPS BX L240CM JAW DIA2.8MM L CAP W/ NDL MIC MESH TOOTH

## (undated) DEVICE — Z DISCONTINUED NO SUB IDED TUBING ETCO2 AD L6.5FT NSL ORAL CVD PRNG NONFLARED TIP OVR

## (undated) DEVICE — ELECTRODE ES AD CRDLSS PT RET REM POLYHESIVE

## (undated) DEVICE — PACK,BASIC,IX: Brand: MEDLINE

## (undated) DEVICE — CHLORAPREP 26ML ORANGE

## (undated) DEVICE — SYRINGE IRRIG 60ML SFT PLIABLE BLB EZ TO GRP 1 HND USE W/

## (undated) DEVICE — INTENDED FOR TISSUE SEPARATION, AND OTHER PROCEDURES THAT REQUIRE A SHARP SURGICAL BLADE TO PUNCTURE OR CUT.: Brand: BARD-PARKER ® STAINLESS STEEL BLADES

## (undated) DEVICE — DRAPE SHEET ULTRAGARD: Brand: MEDLINE

## (undated) DEVICE — ENDOSCOPY KIT: Brand: MEDLINE INDUSTRIES, INC.

## (undated) DEVICE — YANKAUER,FLEXIBLE HANDLE,REGLR CAPACITY: Brand: MEDLINE INDUSTRIES, INC.

## (undated) DEVICE — SKIN AFFIX SURG ADHESIVE 72/CS 0.55ML: Brand: MEDLINE

## (undated) DEVICE — TRAY CATH 16FR F INCLUDE SIL DRNGE BG STATLOK STBL DEV

## (undated) DEVICE — TOWEL,OR,DSP,ST,BLUE,STD,6/PK,12PK/CS: Brand: MEDLINE

## (undated) DEVICE — PENCIL ES CRD L10FT HND SWCHING ROCK SWCH W/ EDGE COAT BLDE

## (undated) DEVICE — SHEET, T, LAPAROTOMY, STERILE: Brand: MEDLINE

## (undated) DEVICE — SUTURE PERMAHAND SZ 2-0 L18IN NONABSORBABLE BLK L26MM SH C012D

## (undated) DEVICE — TOTAL TRAY, DB, 100% SILI FOLEY, 16FR 10: Brand: MEDLINE

## (undated) DEVICE — GLOVE SURG SZ 7 L12IN FNGR THK87MIL WHT LTX FREE

## (undated) DEVICE — MARKER,SKIN,WI/RULER AND LABELS: Brand: MEDLINE

## (undated) DEVICE — SUTURE PERMAHAND SZ 2-0 L17X18IN NONABSORBABLE BLK SILK SA65H

## (undated) DEVICE — ANESTHESIA CIRCUIT ADULT-LF: Brand: MEDLINE INDUSTRIES, INC.

## (undated) DEVICE — STERILE LATEX POWDER-FREE SURGICAL GLOVESWITH NITRILE COATING: Brand: PROTEXIS

## (undated) DEVICE — SOLUTION IV 1000ML 0.9% SOD CHL FOR IRRIG PLAS CONT

## (undated) DEVICE — JELLY LUBRICATING 3 GM BACTERIOSTATIC

## (undated) DEVICE — THE TORRENT IRRIGATION SCOPE CONNECTOR IS USED WITH THE TORRENT IRRIGATION TUBING TO PROVIDE IRRIGATION FLUIDS SUCH AS STERILE WATER DURING GASTROINTESTINAL ENDOSCOPIC PROCEDURES WHEN USED IN CONJUNCTION WITH AN IRRIGATION PUMP (OR ELECTROSURGICAL UNIT).: Brand: TORRENT

## (undated) DEVICE — SPONGE LAP W18XL18IN WHT COT 4 PLY FLD STRUNG RADPQ DISP ST

## (undated) DEVICE — LINE SAMP O2 6.5FT W/FEMALE CONN F/ADULT CAPNOLINE PLUS

## (undated) DEVICE — GOWN,SIRUS,POLYRNF,BRTHSLV,XLN/XL,20/CS: Brand: MEDLINE

## (undated) DEVICE — DUAL LUMEN STOMACH TUBE: Brand: SALEM SUMP

## (undated) DEVICE — DECANTER FLD 9IN ST BG FOR ASEP TRNSF OF FLD

## (undated) DEVICE — GAUZE,SPONGE,4"X4",16PLY,XRAY,STRL,LF: Brand: MEDLINE

## (undated) DEVICE — GLOVE SURG SZ 65 THK91MIL LTX FREE SYN POLYISOPRENE

## (undated) DEVICE — TUBING, SUCTION, 9/32" X 10', STRAIGHT: Brand: MEDLINE

## (undated) DEVICE — LINER SUCT CANSTR 1500CC SEMI RIG W/ POR HYDROPHOBIC SHUT

## (undated) DEVICE — SYRINGE, LUER LOCK, 10ML: Brand: MEDLINE

## (undated) DEVICE — Device

## (undated) DEVICE — LINER,SEMI-RIGID,3000CC,50EA/CS: Brand: MEDLINE

## (undated) DEVICE — STANDARD HYPODERMIC NEEDLE,POLYPROPYLENE HUB: Brand: MONOJECT

## (undated) DEVICE — NEEDLE SCLERO 25GA L240CM OD0.51MM ID0.24MM EXTN L4MM SHTH

## (undated) DEVICE — COUNTER NDL 60 COUNT FOAM STRP SGL MAG

## (undated) DEVICE — SUTURE VCRL SZ 5-0 L18IN ABSRB UD L13MM P-3 3/8 CIR PRIM J493G

## (undated) DEVICE — SUTURE PERMAHAND SZ 2-0 L30IN NONABSORBABLE BLK SH L26MM C016D

## (undated) DEVICE — SNARE VASC L240CM LOOP W10MM SHTH DIA2.4MM RND STIFF CLD

## (undated) DEVICE — SPONGE GZ W4XL8IN COT WVN 12 PLY

## (undated) DEVICE — CIRCUIT BREATHING SINGLE LIMB AD 72 IN 3 LT 2 FILTER LIMBO

## (undated) DEVICE — KENDALL 500 SERIES DIAPHORETIC FOAM MONITORING ELECTRODE - TEAR DROP SHAPE ( 30/PK): Brand: KENDALL

## (undated) DEVICE — SEALER TISS L20CM DIA13MM ADV BPLR L CRV JAW OPN APPRCH

## (undated) DEVICE — SUTURE PERMA HND 2-0 L18IN NONABSORBABLE BLK X-1 L22IN 1/2 737G

## (undated) DEVICE — NEEDLE HYPO 23GA L1.5IN TURQ POLYPR HUB S STL THN WALL IM

## (undated) DEVICE — ELECTRODE NDL L2.8IN COAT LO PWR SET EDGE

## (undated) DEVICE — SKIN MARKER REGULAR TIP WITH RULER CAP AND LABELS: Brand: DEVON

## (undated) DEVICE — BW-412T DISP COMBO CLEANING BRUSH: Brand: SINGLE USE COMBINATION CLEANING BRUSH

## (undated) DEVICE — FIAPC® PROBE W/ FILTER 2200 A OD 2.3MM/6.9FR; L 2.2M/7.2FT: Brand: ERBE

## (undated) DEVICE — ELECTRODE BLDE L6.5IN CAUT EXT DISP

## (undated) DEVICE — STAPLER INT L55MM CUT LN L53MM STPL LN L57MM BLU B FRM 8

## (undated) DEVICE — BRUSH CLN DIA5MM NYL SGL END CBL ASST FLEX DSTL TIP POLYPR

## (undated) DEVICE — SUTURE VCRL SZ 3-0 L36IN ABSRB VLT SH L26MM 1/2 CIR DBL J527H

## (undated) DEVICE — Z DISCONTINUED (USE MFG CAT MVABO)  TUBING GAS SAMPLING STD 6.5 FT FEMALE CONN SMRT CAPNOLINE

## (undated) DEVICE — SUTURE PROL SZ 2-0 L30IN NONABSORBABLE BLU L26MM SH 1/2 CIR 8833H

## (undated) DEVICE — GOWN,SIRUS,FABRNF,RAGLAN,L,ST,30/CS: Brand: MEDLINE

## (undated) DEVICE — BLADE CLIPPER GEN PURP NS

## (undated) DEVICE — SUTURE VCRL SZ 3-0 L27IN ABSRB UD L17MM RB-1 1/2 CIR J215H

## (undated) DEVICE — SUTURE PDS II SZ 1 L96IN ABSRB VLT TP-1 L65MM 1/2 CIR Z880G